# Patient Record
Sex: MALE | Race: BLACK OR AFRICAN AMERICAN | NOT HISPANIC OR LATINO | Employment: OTHER | URBAN - METROPOLITAN AREA
[De-identification: names, ages, dates, MRNs, and addresses within clinical notes are randomized per-mention and may not be internally consistent; named-entity substitution may affect disease eponyms.]

---

## 2017-01-26 ENCOUNTER — OFFICE VISIT (OUTPATIENT)
Dept: OPHTHALMOLOGY | Facility: CLINIC | Age: 72
End: 2017-01-26
Payer: MEDICARE

## 2017-01-26 DIAGNOSIS — H35.342 MACULAR HOLE, LEFT EYE: Primary | ICD-10-CM

## 2017-01-26 DIAGNOSIS — H35.033 HYPERTENSIVE RETINOPATHY OF BOTH EYES: ICD-10-CM

## 2017-01-26 PROCEDURE — 99024 POSTOP FOLLOW-UP VISIT: CPT | Mod: S$GLB,,, | Performed by: OPHTHALMOLOGY

## 2017-01-26 PROCEDURE — 99499 UNLISTED E&M SERVICE: CPT | Mod: S$GLB,,, | Performed by: OPHTHALMOLOGY

## 2017-01-26 PROCEDURE — 99999 PR PBB SHADOW E&M-EST. PATIENT-LVL III: CPT | Mod: PBBFAC,,, | Performed by: OPHTHALMOLOGY

## 2017-01-26 PROCEDURE — 92134 CPTRZ OPH DX IMG PST SGM RTA: CPT | Mod: LT,S$GLB,, | Performed by: OPHTHALMOLOGY

## 2017-01-26 NOTE — MR AVS SNAPSHOT
[place] [facility]   [facility]  [place] [address]  Phone: [phone]  Fax: [phone]                  Geovani Evans Jr.   2017 10:30 AM   Office Visit    Description:  Male : 1945   Provider:  RENE Gallagher MD   Department:  [place] [facility]           Reason for Visit     Post-op Evaluation           Diagnoses this Visit        Comments    Macular hole, left eye    -  Primary     Hypertensive retinopathy of both eyes                To Do List           Goals (5 Years of Data)     None      Follow-Up and Disposition     Return in about 3 months (around 2017).      [facility] On Call     [facility] On Call Nurse Care Line -  Assistance  Registered nurses in the [facility] On Call Center provide clinical advisement, health education, appointment booking, and other advisory services.  Call for this free service at [phone].             Medications           Message regarding Medications     Verify the changes and/or additions to your medication regime listed below are the same as discussed with your clinician today.  If any of these changes or additions are incorrect, please notify your healthcare provider.        STOP taking these medications     ondansetron (ZOFRAN) 4 MG tablet Take 1 tablet (4 mg total) by mouth every 8 (eight) hours as needed for Nausea.    oxycodone-acetaminophen (PERCOCET) 5-325 mg per tablet Take 1 tablet by mouth every 6 (six) hours as needed for Pain.           Verify that the below list of medications is an accurate representation of the medications you are currently taking.  If none reported, the list may be blank. If incorrect, please contact your healthcare provider. Carry this list with you in case of emergency.           Current Medications     dorzolamide-timolol 2-0.5% (COSOPT) 22.3-6.8 mg/mL ophthalmic solution Place 1 drop into the left eye 2 (two) times daily.    lisinopril-hydrochlorothiazide  (PRINZIDE,ZESTORETIC) 10-12.5 mg per tablet Take 1 tablet by mouth once daily.    prednisoLONE acetate (PRED FORTE) 1 % DrpS Place 1 drop into the left eye 2 (two) times daily.    homatropine (ISOPTO HOMATROPINE) 5 % ophthalmic solution Place 1 drop into the left eye 2 (two) times daily.           Clinical Reference Information           Allergies as of 1/26/2017     No Known Allergies      Immunizations Administered on Date of Encounter - 1/26/2017     None      Orders Placed During Today's Visit      Normal Orders This Visit    Posterior Segment OCT Retina-Both eyes

## 2017-01-26 NOTE — PROGRESS NOTES
OCT -   OD - No ME  OS - MH closed    A/P    1. FTMH OS  Pt unsure of duration  On 3/15, VA OS 20/40  Some small RPE atrophy suggests some chronicity    s/p 25g PPV/MP/EL/AFx/C3F8 OS for FTMH and holes OS 12/7/16    Doing well, MH closed, IOP improved    Keep PF/Cosopt Q day until bubble gone    2. NS OU    3. HTN Ret OU  BP control      3  month OCT/MRx

## 2017-04-27 ENCOUNTER — OFFICE VISIT (OUTPATIENT)
Dept: OPHTHALMOLOGY | Facility: CLINIC | Age: 72
End: 2017-04-27
Payer: MEDICARE

## 2017-04-27 DIAGNOSIS — H35.033 HYPERTENSIVE RETINOPATHY OF BOTH EYES: ICD-10-CM

## 2017-04-27 DIAGNOSIS — H35.342 MACULAR HOLE, LEFT EYE: Primary | ICD-10-CM

## 2017-04-27 PROCEDURE — 92226 PR SPECIAL EYE EXAM, SUBSEQUENT: CPT | Mod: 50,S$GLB,, | Performed by: OPHTHALMOLOGY

## 2017-04-27 PROCEDURE — 92014 COMPRE OPH EXAM EST PT 1/>: CPT | Mod: S$GLB,,, | Performed by: OPHTHALMOLOGY

## 2017-04-27 PROCEDURE — 92134 CPTRZ OPH DX IMG PST SGM RTA: CPT | Mod: S$GLB,,, | Performed by: OPHTHALMOLOGY

## 2017-04-27 PROCEDURE — 99499 UNLISTED E&M SERVICE: CPT | Mod: S$GLB,,, | Performed by: OPHTHALMOLOGY

## 2017-04-27 PROCEDURE — 99999 PR PBB SHADOW E&M-EST. PATIENT-LVL III: CPT | Mod: PBBFAC,,, | Performed by: OPHTHALMOLOGY

## 2017-04-27 NOTE — PROGRESS NOTES
OCT -   OD - No ME  OS - MH closed    A/P    1. FTMH OS  Pt unsure of duration  On 3/15, VA OS 20/40  Some small RPE atrophy suggests some chronicity    s/p 25g PPV/MP/EL/AFx/C3F8 OS for FTMH and holes OS 12/7/16    Doing well, MH closed, IOP improved    2. NS OU  Increasing OS  Eval with Dr. MIMS    3. HTN Ret OU  BP control      1 yr OCT

## 2017-04-27 NOTE — MR AVS SNAPSHOT
[place] [facility]   [facility]  [place] [address]  Phone: [phone]  Fax: [phone]                  Geovani Evans Jr.   2017 9:20 AM   Office Visit    Description:  Male : 1945   Provider:  RENE Gallagher MD   Department:  [place] [facility]           Reason for Visit     Eye Problem           Diagnoses this Visit        Comments    Macular hole, left eye    -  Primary     Hypertensive retinopathy of both eyes                To Do List           Goals (5 Years of Data)     None      Follow-Up and Disposition     Return in about 6 months (around 10/27/2017).      [facility] On Call     [facility] On Call Nurse Care Line -  Assistance  Unless otherwise directed by your provider, please contact [facility], our nurse care line that is available for  assistance.     Registered nurses in the [facility] On Call Center provide: appointment scheduling, clinical advisement, health education, and other advisory services.  Call: [phone] (toll free)               Medications           Message regarding Medications     Verify the changes and/or additions to your medication regime listed below are the same as discussed with your clinician today.  If any of these changes or additions are incorrect, please notify your healthcare provider.             Verify that the below list of medications is an accurate representation of the medications you are currently taking.  If none reported, the list may be blank. If incorrect, please contact your healthcare provider. Carry this list with you in case of emergency.           Current Medications     lisinopril-hydrochlorothiazide (PRINZIDE,ZESTORETIC) 10-12.5 mg per tablet Take 1 tablet by mouth once daily.    dorzolamide-timolol 2-0.5% (COSOPT) 22.3-6.8 mg/mL ophthalmic solution Place 1 drop into the left eye 2 (two) times daily.    homatropine (ISOPTO HOMATROPINE) 5 % ophthalmic solution Place 1 drop into the left  eye 2 (two) times daily.    prednisoLONE acetate (PRED FORTE) 1 % DrpS Place 1 drop into the left eye 2 (two) times daily.           Clinical Reference Information           Allergies as of 4/27/2017     No Known Allergies      Immunizations Administered on Date of Encounter - 4/27/2017     None      Orders Placed During Today's Visit      Normal Orders This Visit    Posterior Segment OCT Retina-Both eyes     Future Labs/Procedures Expected by Expires    Posterior Segment OCT Retina-Both eyes  As directed 4/27/2018      Language Assistance Services     ATTENTION: Language assistance services are available, free of charge. Please call [phone].      ATENCIÓN: Si shawnala eric, tiene a urena disposición servicios gratuitos de asistencia lingüística. Llame al [phone].     CHÚ Ý: N?u b?n nói Ti?ng Vi?t, có các d?ch v? h? tr? ngôn ng? mi?n phí dành cho b?n. G?i s? [phone].         [place] [facility] complies with applicable Federal civil rights laws and does not discriminate on the basis of race, color, national origin, age, disability, or sex.

## 2017-05-10 ENCOUNTER — INITIAL CONSULT (OUTPATIENT)
Dept: OPHTHALMOLOGY | Facility: CLINIC | Age: 72
End: 2017-05-10
Payer: MEDICARE

## 2017-05-10 DIAGNOSIS — H35.342 MACULAR HOLE, LEFT EYE: ICD-10-CM

## 2017-05-10 DIAGNOSIS — H26.9 CORTICAL CATARACT OF BOTH EYES: ICD-10-CM

## 2017-05-10 DIAGNOSIS — H52.7 REFRACTIVE ERROR: ICD-10-CM

## 2017-05-10 DIAGNOSIS — H25.13 NS (NUCLEAR SCLEROSIS), BILATERAL: Primary | ICD-10-CM

## 2017-05-10 PROCEDURE — 92136 OPHTHALMIC BIOMETRY: CPT | Mod: LT,S$GLB,, | Performed by: OPHTHALMOLOGY

## 2017-05-10 PROCEDURE — 92014 COMPRE OPH EXAM EST PT 1/>: CPT | Mod: S$GLB,,, | Performed by: OPHTHALMOLOGY

## 2017-05-10 PROCEDURE — 99999 PR PBB SHADOW E&M-EST. PATIENT-LVL III: CPT | Mod: PBBFAC,,, | Performed by: OPHTHALMOLOGY

## 2017-05-10 RX ORDER — OFLOXACIN 3 MG/ML
1 SOLUTION/ DROPS OPHTHALMIC 4 TIMES DAILY
Qty: 5 ML | Refills: 1 | Status: SHIPPED | OUTPATIENT
Start: 2017-06-24 | End: 2017-07-04

## 2017-05-10 RX ORDER — DIFLUPREDNATE OPHTHALMIC 0.5 MG/ML
1 EMULSION OPHTHALMIC 4 TIMES DAILY
Qty: 5 ML | Refills: 1 | Status: SHIPPED | OUTPATIENT
Start: 2017-06-27 | End: 2017-07-27

## 2017-05-10 RX ORDER — NEPAFENAC 3 MG/ML
1 SUSPENSION/ DROPS OPHTHALMIC DAILY
Qty: 3 ML | Refills: 1 | Status: SHIPPED | OUTPATIENT
Start: 2017-06-24 | End: 2017-07-24

## 2017-05-10 NOTE — PROGRESS NOTES
Subjective:       Patient ID: Geovani Evans Jr. is a 71 y.o. male.    Chief Complaint: Cataract (left eye )    HPI  Review of Systems    Objective:      Physical Exam    Assessment:       1. NS (nuclear sclerosis), bilateral    2. Cortical cataract of both eyes    3. Macular hole, left eye    4. Refractive error        Plan:       Visually significant cataract OS -Pt. Wants Sx.     Cataract OD- Not visually significant per Pt.  Macular hole OS s/p PPV/MP/EL/AFx/C#F8 OS 12/7/16-Stable per Dr Gallagher.  RE-No need for Toric IOL.          CE OS 6/27/17 SN60WF 18.5.

## 2017-05-10 NOTE — MR AVS SNAPSHOT
[place] [facility]   [facility]  [place] [address]  Phone: [phone]  Fax: [phone]                  Geovani Evans Jr.   5/10/2017 9:00 AM   Initial consult    Description:  Male : 1945   Provider:  Patricio Cruz MD   Department:  [place] [facility]           Reason for Visit     Cataract           Diagnoses this Visit        Comments    NS (nuclear sclerosis), bilateral    -  Primary     Cortical cataract of both eyes         Macular hole, left eye         Refractive error                To Do List           Future Appointments        Provider Department Dept Phone    2017 8:15 AM Patricio Cruz MD [place] [facility] [phone]    2017 9:30 AM Patricio Cruz MD [place] [facility] [phone]    2017 9:00 AM Patricio Cruz MD [place] [facility] [phone]      Goals (5 Years of Data)     None      Follow-Up and Disposition     Return in about 7 weeks (around 2017) for CE OS.       These Medications        Disp Refills Start End    ILEVRO 0.3 % DrpS 3 mL 1 2017    Place 1 drop into both eyes once daily. For 30 days - Both Eyes    Pharmacy: [facility] [address] [place] [address] Ph #: [phone]       ofloxacin (OCUFLOX) 0.3 % ophthalmic solution 5 mL 1 2017    Place 1 drop into the left eye 4 (four) times daily. - Left Eye    Pharmacy: [facility] [address] [place] [address] Ph #: [phone]       difluprednate (DUREZOL) 0.05 % Drop ophthalmic solution 5 mL 1 2017    Place 1 drop into the left eye 4 (four) times daily. For 30 days - Left Eye    Pharmacy: [facility] [address] [place] [address] [address] Ph #: [phone]         [facility]     [facility] Nurse Care Line -  Assistance  Unless otherwise directed by your provider, please contact [facility], our nurse care  line that is available for 24/7 assistance.     Registered nurses in the [facility] Center provide: appointment scheduling, clinical advisement, health education, and other advisory services.  Call: [phone] (toll free)               Medications           Message regarding Medications     Verify the changes and/or additions to your medication regime listed below are the same as discussed with your clinician today.  If any of these changes or additions are incorrect, please notify your healthcare provider.        START taking these NEW medications        Refills    ILEVRO 0.3 % DrpS 1    Starting on: 6/24/2017    Sig: Place 1 drop into both eyes once daily. For 30 days    Class: Normal    Route: Both Eyes    ofloxacin (OCUFLOX) 0.3 % ophthalmic solution 1    Starting on: 6/24/2017    Sig: Place 1 drop into the left eye 4 (four) times daily.    Class: Normal    Route: Left Eye    difluprednate (DUREZOL) 0.05 % Drop ophthalmic solution 1    Starting on: 6/27/2017    Sig: Place 1 drop into the left eye 4 (four) times daily. For 30 days    Class: Normal    Route: Left Eye      STOP taking these medications     dorzolamide-timolol 2-0.5% (COSOPT) 22.3-6.8 mg/mL ophthalmic solution Place 1 drop into the left eye 2 (two) times daily.    homatropine (ISOPTO HOMATROPINE) 5 % ophthalmic solution Place 1 drop into the left eye 2 (two) times daily.    prednisoLONE acetate (PRED FORTE) 1 % DrpS Place 1 drop into the left eye 2 (two) times daily.           Verify that the below list of medications is an accurate representation of the medications you are currently taking.  If none reported, the list may be blank. If incorrect, please contact your healthcare provider. Carry this list with you in case of emergency.           Current Medications     lisinopril-hydrochlorothiazide (PRINZIDE,ZESTORETIC) 10-12.5 mg per tablet Take 1 tablet by mouth once daily.    difluprednate (DUREZOL) 0.05 % Drop ophthalmic solution Starting  on Jun 27, 2017. Place 1 drop into the left eye 4 (four) times daily. For 30 days    ILEVRO 0.3 % DrpS Starting on Jun 24, 2017. Place 1 drop into both eyes once daily. For 30 days    ofloxacin (OCUFLOX) 0.3 % ophthalmic solution Starting on Jun 24, 2017. Place 1 drop into the left eye 4 (four) times daily.           Clinical Reference Information           Allergies as of 5/10/2017     No Known Allergies      Immunizations Administered on Date of Encounter - 5/10/2017     None      Orders Placed During Today's Visit      Normal Orders This Visit    Biometry       Language Assistance Services     ATTENTION: Language assistance services are available, free of charge. Please call [phone].      ATENCIÓN: Si shawnala eric, tiene a urena disposición servicios gratuitos de asistencia lingüística. Llame al [phone].     CHÚ Ý: N?u b?n nói Ti?ng Vi?t, có các d?ch v? h? tr? ngôn ng? mi?n phí dành cho b?n. G?i s? [phone].         [place] [facility] complies with applicable Federal civil rights laws and does not discriminate on the basis of race, color, national origin, age, disability, or sex.

## 2017-05-10 NOTE — LETTER
May 10, 2017      RENE Gallagher MD  [address]  [facility] [address]           [place] [facility]  [address] [facility]  [place] [address]  Phone: [phone]  Fax: [phone]          Patient: Geovani Evans Jr.   MR Number: [id number]   YOB: 1945   Date of Visit: 5/10/2017       Dear Dr. RENE Gallagher:    Thank you for referring Geovani Evans to me for evaluation. Attached you will find relevant portions of my assessment and plan of care.    If you have questions, please do not hesitate to call me. I look forward to following Geovani Evans along with you.    Sincerely,    Patricio Cruz MD    Enclosure  CC:  No Recipients    If you would like to receive this communication electronically, please contact [email or web address] or [phone] to request more information on [employer] Link access.    For providers and/or their staff who would like to refer a patient to [facility], please contact us through our one-stop-shop provider referral line, [facility], at [phone].    If you feel you have received this communication in error or would no longer like to receive these types of communications, please e-mail [email or web address]

## 2017-05-16 ENCOUNTER — TELEPHONE (OUTPATIENT)
Dept: OPHTHALMOLOGY | Facility: CLINIC | Age: 72
End: 2017-05-16

## 2017-05-16 DIAGNOSIS — H25.12 NUCLEAR SCLEROSIS, LEFT: Primary | ICD-10-CM

## 2017-06-20 ENCOUNTER — TELEPHONE (OUTPATIENT)
Dept: OPTOMETRY | Facility: CLINIC | Age: 72
End: 2017-06-20

## 2017-06-24 NOTE — H&P
[facility]  History & Physical    Subjective:      Chief Complaint/Reason for Admission:     Geovani Evans Jr. is a 71 y.o. male.    Past Medical History:   Diagnosis Date    Anemia     Hypertension     Hypogonadism male     Obesity     Thyroid nodule     neg bx 2/2011     Past Surgical History:   Procedure Laterality Date    FRACTURE SURGERY      left great toe. bunion repair/shaved bone     Family History   Problem Relation Age of Onset    Stroke Father     Heart disease Father     No Known Problems Mother     No Known Problems Brother     No Known Problems Daughter     No Known Problems Daughter     No Known Problems Daughter     Blindness Neg Hx     Cataracts Neg Hx     Diabetes Neg Hx     Glaucoma Neg Hx     Hypertension Neg Hx     Macular degeneration Neg Hx     Retinal detachment Neg Hx     Strabismus Neg Hx     Thyroid disease Neg Hx     Thyroid cancer Neg Hx      Social History   Substance Use Topics    Smoking status: Never Smoker    Smokeless tobacco: Never Used    Alcohol use No       No prescriptions prior to admission.     Review of patient's allergies indicates:  No Known Allergies     Review of Systems   Eyes: Positive for blurred vision.   All other systems reviewed and are negative.      Objective:      Vital Signs (Most Recent)       Vital Signs Range (Last 24H):       Physical Exam   Constitutional: He is oriented to person, place, and time. He appears well-developed and well-nourished.   HENT:   Head: Normocephalic.   Eyes: Conjunctivae and EOM are normal. Pupils are equal, round, and reactive to light.   Neck: Normal range of motion. Neck supple.   Cardiovascular: Normal rate.    Pulmonary/Chest: Effort normal and breath sounds normal.   Abdominal: Soft. Bowel sounds are normal.   Musculoskeletal: Normal range of motion.   Neurological: He is alert and oriented to person, place, and time.   Skin: Skin is warm.   Psychiatric: He has a normal mood  and affect.       Data Review:    ECG:     Assessment:      Cataract OS.    Plan:    CE OS.

## 2017-06-27 ENCOUNTER — ANESTHESIA EVENT (OUTPATIENT)
Dept: SURGERY | Facility: HOSPITAL | Age: 72
End: 2017-06-27
Payer: MEDICARE

## 2017-06-27 ENCOUNTER — ANESTHESIA (OUTPATIENT)
Dept: SURGERY | Facility: HOSPITAL | Age: 72
End: 2017-06-27
Payer: MEDICARE

## 2017-06-27 ENCOUNTER — HOSPITAL ENCOUNTER (OUTPATIENT)
Facility: HOSPITAL | Age: 72
Discharge: HOME | End: 2017-06-27
Attending: OPHTHALMOLOGY | Admitting: OPHTHALMOLOGY
Payer: MEDICARE

## 2017-06-27 VITALS
HEIGHT: 69 IN | RESPIRATION RATE: 16 BRPM | DIASTOLIC BLOOD PRESSURE: 78 MMHG | HEART RATE: 69 BPM | WEIGHT: 225 LBS | SYSTOLIC BLOOD PRESSURE: 120 MMHG | OXYGEN SATURATION: 100 % | BODY MASS INDEX: 33.33 KG/M2 | TEMPERATURE: 98 F

## 2017-06-27 DIAGNOSIS — H25.10 SENILE NUCLEAR SCLEROSIS: ICD-10-CM

## 2017-06-27 PROCEDURE — 27201423 OPTIME MED/SURG SUP & DEVICES STERILE SUPPLY: Performed by: OPHTHALMOLOGY

## 2017-06-27 PROCEDURE — 36000706: Performed by: OPHTHALMOLOGY

## 2017-06-27 PROCEDURE — D9220A PRA ANESTHESIA: Mod: ANES,,, | Performed by: ANESTHESIOLOGY

## 2017-06-27 PROCEDURE — C9447 INJ, PHENYLEPHRINE KETOROLAC: HCPCS | Performed by: OPHTHALMOLOGY

## 2017-06-27 PROCEDURE — V2632 POST CHMBR INTRAOCULAR LENS: HCPCS | Performed by: OPHTHALMOLOGY

## 2017-06-27 PROCEDURE — 63600175 PHARM REV CODE 636 W HCPCS: Performed by: OPHTHALMOLOGY

## 2017-06-27 PROCEDURE — 63600175 PHARM REV CODE 636 W HCPCS: Performed by: NURSE ANESTHETIST, CERTIFIED REGISTERED

## 2017-06-27 PROCEDURE — 37000008 HC ANESTHESIA 1ST 15 MINUTES: Performed by: OPHTHALMOLOGY

## 2017-06-27 PROCEDURE — 37000009 HC ANESTHESIA EA ADD 15 MINS: Performed by: OPHTHALMOLOGY

## 2017-06-27 PROCEDURE — 25000003 PHARM REV CODE 250: Performed by: ANESTHESIOLOGY

## 2017-06-27 PROCEDURE — 25000003 PHARM REV CODE 250: Performed by: OPHTHALMOLOGY

## 2017-06-27 PROCEDURE — 36000707: Performed by: OPHTHALMOLOGY

## 2017-06-27 PROCEDURE — 71000033 HC RECOVERY, INTIAL HOUR: Performed by: OPHTHALMOLOGY

## 2017-06-27 PROCEDURE — 66984 XCAPSL CTRC RMVL W/O ECP: CPT | Mod: LT,,, | Performed by: OPHTHALMOLOGY

## 2017-06-27 PROCEDURE — D9220A PRA ANESTHESIA: Mod: CRNA,,, | Performed by: NURSE ANESTHETIST, CERTIFIED REGISTERED

## 2017-06-27 PROCEDURE — 71000015 HC POSTOP RECOV 1ST HR: Performed by: OPHTHALMOLOGY

## 2017-06-27 DEVICE — LENS 18.5: Type: IMPLANTABLE DEVICE | Site: EYE | Status: FUNCTIONAL

## 2017-06-27 RX ORDER — SODIUM CHLORIDE 9 MG/ML
INJECTION, SOLUTION INTRAVENOUS CONTINUOUS
Status: DISCONTINUED | OUTPATIENT
Start: 2017-06-27 | End: 2017-06-27 | Stop reason: HOSPADM

## 2017-06-27 RX ORDER — CYCLOPENTOLATE HYDROCHLORIDE 10 MG/ML
1 SOLUTION/ DROPS OPHTHALMIC
Status: DISCONTINUED | OUTPATIENT
Start: 2017-06-27 | End: 2017-06-27 | Stop reason: HOSPADM

## 2017-06-27 RX ORDER — PROPARACAINE HYDROCHLORIDE 5 MG/ML
1 SOLUTION/ DROPS OPHTHALMIC
Status: DISCONTINUED | OUTPATIENT
Start: 2017-06-27 | End: 2017-06-27 | Stop reason: HOSPADM

## 2017-06-27 RX ORDER — OFLOXACIN 3 MG/ML
SOLUTION/ DROPS OPHTHALMIC
Status: DISCONTINUED | OUTPATIENT
Start: 2017-06-27 | End: 2017-06-27 | Stop reason: HOSPADM

## 2017-06-27 RX ORDER — ACETAMINOPHEN 325 MG/1
650 TABLET ORAL EVERY 4 HOURS PRN
Status: DISCONTINUED | OUTPATIENT
Start: 2017-06-27 | End: 2017-06-27 | Stop reason: HOSPADM

## 2017-06-27 RX ORDER — TROPICAMIDE 10 MG/ML
1 SOLUTION/ DROPS OPHTHALMIC
Status: DISCONTINUED | OUTPATIENT
Start: 2017-06-27 | End: 2017-06-27 | Stop reason: HOSPADM

## 2017-06-27 RX ORDER — OFLOXACIN 3 MG/ML
1 SOLUTION/ DROPS OPHTHALMIC
Status: DISPENSED | OUTPATIENT
Start: 2017-06-27 | End: 2017-06-27

## 2017-06-27 RX ORDER — PHENYLEPHRINE HYDROCHLORIDE 25 MG/ML
1 SOLUTION/ DROPS OPHTHALMIC
Status: DISCONTINUED | OUTPATIENT
Start: 2017-06-27 | End: 2017-06-27 | Stop reason: HOSPADM

## 2017-06-27 RX ORDER — LIDOCAINE HYDROCHLORIDE 10 MG/ML
1 INJECTION, SOLUTION EPIDURAL; INFILTRATION; INTRACAUDAL; PERINEURAL ONCE
Status: COMPLETED | OUTPATIENT
Start: 2017-06-27 | End: 2017-06-27

## 2017-06-27 RX ORDER — LIDOCAINE HYDROCHLORIDE 40 MG/ML
INJECTION, SOLUTION RETROBULBAR
Status: DISCONTINUED | OUTPATIENT
Start: 2017-06-27 | End: 2017-06-27 | Stop reason: HOSPADM

## 2017-06-27 RX ORDER — HYDROCODONE BITARTRATE AND ACETAMINOPHEN 5; 325 MG/1; MG/1
TABLET ORAL
Status: DISCONTINUED
Start: 2017-06-27 | End: 2017-06-27 | Stop reason: HOSPADM

## 2017-06-27 RX ORDER — FENTANYL CITRATE 50 UG/ML
INJECTION, SOLUTION INTRAMUSCULAR; INTRAVENOUS
Status: DISCONTINUED | OUTPATIENT
Start: 2017-06-27 | End: 2017-06-27

## 2017-06-27 RX ORDER — PREDNISOLONE ACETATE 10 MG/ML
SUSPENSION/ DROPS OPHTHALMIC
Status: DISCONTINUED | OUTPATIENT
Start: 2017-06-27 | End: 2017-06-27 | Stop reason: HOSPADM

## 2017-06-27 RX ORDER — HYDROCODONE BITARTRATE AND ACETAMINOPHEN 5; 325 MG/1; MG/1
1 TABLET ORAL EVERY 4 HOURS PRN
Status: DISCONTINUED | OUTPATIENT
Start: 2017-06-27 | End: 2017-06-27 | Stop reason: HOSPADM

## 2017-06-27 RX ORDER — MIDAZOLAM HYDROCHLORIDE 1 MG/ML
INJECTION, SOLUTION INTRAMUSCULAR; INTRAVENOUS
Status: DISCONTINUED | OUTPATIENT
Start: 2017-06-27 | End: 2017-06-27

## 2017-06-27 RX ADMIN — PHENYLEPHRINE HYDROCHLORIDE 1 DROP: 25 SOLUTION/ DROPS OPHTHALMIC at 12:06

## 2017-06-27 RX ADMIN — OFLOXACIN 1 DROP: 3 SOLUTION OPHTHALMIC at 12:06

## 2017-06-27 RX ADMIN — CYCLOPENTOLATE HYDROCHLORIDE 1 DROP: 10 SOLUTION/ DROPS OPHTHALMIC at 12:06

## 2017-06-27 RX ADMIN — TROPICAMIDE 1 DROP: 10 SOLUTION/ DROPS OPHTHALMIC at 12:06

## 2017-06-27 RX ADMIN — MIDAZOLAM HYDROCHLORIDE 2 MG: 1 INJECTION, SOLUTION INTRAMUSCULAR; INTRAVENOUS at 01:06

## 2017-06-27 RX ADMIN — FENTANYL CITRATE 50 MCG: 50 INJECTION, SOLUTION INTRAMUSCULAR; INTRAVENOUS at 02:06

## 2017-06-27 RX ADMIN — LIDOCAINE HYDROCHLORIDE 0.1 MG: 10 INJECTION, SOLUTION EPIDURAL; INFILTRATION; INTRACAUDAL; PERINEURAL at 12:06

## 2017-06-27 RX ADMIN — PROPARACAINE HYDROCHLORIDE 1 DROP: 5 SOLUTION/ DROPS OPHTHALMIC at 12:06

## 2017-06-27 RX ADMIN — SODIUM CHLORIDE: 0.9 INJECTION, SOLUTION INTRAVENOUS at 12:06

## 2017-06-27 RX ADMIN — HYDROCODONE BITARTRATE AND ACETAMINOPHEN 1 TABLET: 5; 325 TABLET ORAL at 02:06

## 2017-06-27 NOTE — ANESTHESIA POSTPROCEDURE EVALUATION
"Anesthesia Post Evaluation    Patient: Geovani Evans Jr.    Procedure(s) Performed: Procedure(s) (LRB):  PHACOEMULSIFICATION-ASPIRATION-CATARACT (Left)  INSERTION-INTRAOCULAR LENS (IOL) (Left)    Final Anesthesia Type: general  Patient location during evaluation: PACU  Patient participation: Yes- Able to Participate  Level of consciousness: awake and alert  Post-procedure vital signs: reviewed and stable  Pain management: adequate  Airway patency: patent  PONV status at discharge: No PONV  Anesthetic complications: no      Cardiovascular status: blood pressure returned to baseline  Respiratory status: unassisted  Hydration status: euvolemic  Follow-up not needed.        Visit Vitals  /80   Pulse 63   Temp 36.5 °C (97.7 °F) (Skin)   Resp 20   Ht 5' 9" (1.753 m)   Wt 102.1 kg (225 lb)   SpO2 100%   BMI 33.23 kg/m²       Pain/Celine Score: Pain Assessment Performed: Yes (6/27/2017 12:49 PM)  Presence of Pain: denies (6/27/2017 12:49 PM)  Pain Rating Prior to Med Admin: 6 (6/27/2017  2:53 PM)  Celine Score: 10 (6/27/2017  2:53 PM)  Modified Celine Score: 20 (6/27/2017 12:49 PM)      "

## 2017-06-27 NOTE — BRIEF OP NOTE
Operative Note     SUMMARY     Surgery Date: 6/27/2017    Surgeon(s) and Role:      Patricio Cruz MD - Primary    Pre-op Diagnosis:  Nuclear sclerosis     Post-op/ Diagnosis:  Same    Final Diagnosis: Cataract    Procedure(s) (LRB):  PHACOEMULSIFICATION-ASPIRATION-CATARACT   INSERTION-INTRAOCULAR LENS (IOL)     Anesthesia: Monitored Anesthesia Care    Findings/Key Components:  Cataract    Outcome: Tolerated procedure well    Estimated Blood Loss: None         Specimens     None          Follow-up:  Tomorrow in clinic      Discharge Note      SUMMARY     Admit Date: 6/27/2017    Attending Physician: Patricio Cruz MD    Discharge Physician: Patricio Cruz MD    Discharge Date: 6/27/2017    Final Diagnosis: Cataract    Outcome: Tolerated procedure well    Disposition: Discharge to Home.    Medications:       Geovani Evans Jr.   Home Medication Instructions OTIS:[id number]    Printed on:06/27/17 4490   Medication Information                      difluprednate (DUREZOL) 0.05 % Drop ophthalmic solution  Place 1 drop into the left eye 4 (four) times daily. For 30 days             ILEVRO 0.3 % DrpS  Place 1 drop into both eyes once daily. For 30 days             lisinopril-hydrochlorothiazide (PRINZIDE,ZESTORETIC) 10-12.5 mg per tablet  Take 1 tablet by mouth once daily.             ofloxacin (OCUFLOX) 0.3 % ophthalmic solution  Place 1 drop into the left eye 4 (four) times daily.                   Patient Discharge Instructions:     Keep Evans Shield over operated eye when not using drops.     DIET:  Regular    Activity: No heavy lifting or bending X 1 week.    Follow-up:  Tomorrow in clinic

## 2017-06-27 NOTE — DISCHARGE INSTRUCTIONS
Discharge Instructions for Cataract Surgery  A surgeon removed the cloudy lens in your eye and replaced it with a clear man-made lens. Be sure to have an adult family member or friend drive you home after surgery. Heres what you can expect following surgery and tips for a healthy recovery.  It is normal to have the following:  · Bruised or bloodshot eye for 7 days  · Itching and mild discomfort for several days  · Some fluid discharge  · Sensitivity to light  · Scratchy, sandlike feeling in the eye for 2 weeks  · Feeling tired, especially during the first 24 hours  Activity level  · Do not drive for 2 days or as instructed by your doctor.  · Do not drink alcohol for at least 24 hours.  · Avoid bending at the waist to  objects or lifting anything heavy for 2 days.  · Relax for the first 24 hours after surgery. Watching TV and reading are OK and wont harm your eye.  Eye protection  · Do not rub or press on your eye.  · Sleep on your back or on your unoperated side for 2 nights.  · If instructed, wear a bandage over your eye for 2 days and 2 nights.  · If instructed, wear a shield to protect your eye for 2 days and 2 nights.  Using eyedrops  You may be given special eyedrops or ointment. Here is one way to use eyedrops:  · Tilt your head back.  · Pull your bottom eyelid down.  · Squeeze one drop into your eye. Do not touch your eye with the bottle tip.  · Close your eyes for a few seconds.  · If you need more than one drop, wait a few minutes before adding the next one.   Call your doctor right away if you have any of the following:  · Bleeding or discharge from the eye  · Your vision suddenly becomes worse  · Pain medicine you are told to take does not ease your pain  · Nausea or vomiting  · Chills or fever over 100.4°F (39.1°C)   Date Last Reviewed: 5/30/2015  © 6031-2146 [employer]. [address] [place] [address], [place] [address]. All rights reserved. This information is not intended as a  substitute for professional medical care. Always follow your healthcare professional's instructions.

## 2017-06-27 NOTE — ANESTHESIA RELEASE NOTE
"Anesthesia Release from PACU Note    Patient: Geovani Evans Jr.    Procedure(s) Performed: Procedure(s) (LRB):  PHACOEMULSIFICATION-ASPIRATION-CATARACT (Left)  INSERTION-INTRAOCULAR LENS (IOL) (Left)    Anesthesia type: general    Post pain: Adequate analgesia    Post assessment: no apparent anesthetic complications    Last Vitals:   Visit Vitals  /80   Pulse 63   Temp 36.5 °C (97.7 °F) (Skin)   Resp 20   Ht 5' 9" (1.753 m)   Wt 102.1 kg (225 lb)   SpO2 100%   BMI 33.23 kg/m²       Post vital signs: stable    Level of consciousness: awake    Nausea/Vomiting: no nausea/no vomiting    Complications: none    Airway Patency: patent    Respiratory: unassisted    Cardiovascular: stable and blood pressure at baseline    Hydration: euvolemic  "

## 2017-06-27 NOTE — PLAN OF CARE
Pt to pod 15 AAOx4 oriented to immediate surroundings and situation and verbalized understanding, acceptance and satisfaction with clinical encounter.Patient arrived to unit via stretcher from____or___. Denies pain. Denies shortness of breath. Monitoring equipment placed on pt with noted VSS HRR .IVF patent per gravity and without any s/s of infiltration noted and saline locked upon arrival.. Bed in lowest position. Brakes locked Call light within reach. Side rails up x2.

## 2017-06-27 NOTE — ANESTHESIA PREPROCEDURE EVALUATION
06/27/2017  Geovani Evans Jr. is a 71 y.o., male.  72 yo male with pmh of htn presents for phacoemulsification of left eye.  Anesthesia Evaluation    I have reviewed the Patient Summary Reports.        Review of Systems  Anesthesia Hx:   Denies Personal Hx of Anesthesia complications.   Cardiovascular:   Hypertension        Physical Exam  General:  Well nourished    Airway/Jaw/Neck:  Airway Findings: Mouth Opening: Normal Tongue: Normal  General Airway Assessment: Adult  Mallampati: III  Improves to III with phonation.  TM Distance: Normal, at least 6 cm      Dental:  Dental Findings:   Chest/Lungs:  Chest/Lungs Clear    Heart/Vascular:  Heart Findings: Normal            Anesthesia Plan  Type of Anesthesia, risks & benefits discussed:  Anesthesia Type:  general  Patient's Preference:   Intra-op Monitoring Plan:   Intra-op Monitoring Plan Comments:   Post Op Pain Control Plan:   Post Op Pain Control Plan Comments:   Induction:   IV  Beta Blocker:  Patient is not currently on a Beta-Blocker (No further documentation required).       Informed Consent: Patient understands risks and agrees with Anesthesia plan.  Questions answered. Anesthesia consent signed with patient.  ASA Score: 2     Day of Surgery Review of History & Physical:  There are no significant changes.          Ready For Surgery From Anesthesia Perspective.

## 2017-06-27 NOTE — TRANSFER OF CARE
"Anesthesia Transfer of Care Note    Patient: Geovani Evans Jr.    Procedure(s) Performed: Procedure(s) (LRB):  PHACOEMULSIFICATION-ASPIRATION-CATARACT (Left)  INSERTION-INTRAOCULAR LENS (IOL) (Left)    Patient location: PACU    Anesthesia Type: MAC    Transport from OR: Transported from OR on room air with adequate spontaneous ventilation    Post pain: adequate analgesia    Post assessment: no apparent anesthetic complications    Post vital signs: stable    Level of consciousness: awake and alert    Nausea/Vomiting: no nausea/vomiting    Complications: none    Transfer of care protocol was followed      Last vitals:   Visit Vitals  /83 (BP Location: Right arm, Patient Position: Lying, BP Method: Automatic)   Pulse 63   Temp 36.5 °C (97.7 °F) (Skin)   Resp 20   Ht 5' 9" (1.753 m)   Wt 102.1 kg (225 lb)   SpO2 100%   BMI 33.23 kg/m²     "

## 2017-06-28 ENCOUNTER — OFFICE VISIT (OUTPATIENT)
Dept: OPHTHALMOLOGY | Facility: CLINIC | Age: 72
End: 2017-06-28
Payer: MEDICARE

## 2017-06-28 VITALS — SYSTOLIC BLOOD PRESSURE: 116 MMHG | DIASTOLIC BLOOD PRESSURE: 81 MMHG | HEART RATE: 70 BPM

## 2017-06-28 DIAGNOSIS — Z98.890 POST-OPERATIVE STATE: Primary | ICD-10-CM

## 2017-06-28 PROCEDURE — 99024 POSTOP FOLLOW-UP VISIT: CPT | Mod: S$GLB,,, | Performed by: OPHTHALMOLOGY

## 2017-06-28 PROCEDURE — 99999 PR PBB SHADOW E&M-EST. PATIENT-LVL III: CPT | Mod: PBBFAC,,, | Performed by: OPHTHALMOLOGY

## 2017-06-28 NOTE — PROGRESS NOTES
Subjective:       Patient ID: Geovani Evans Jr. is a 71 y.o. male.    Chief Complaint: Post-op Evaluation    HPI  Review of Systems    Objective:      Physical Exam    Assessment:       1. Post-operative state        Plan:       S/p CE OS- Doing well.           CPM OS.  RTC 1 wk.

## 2017-07-05 ENCOUNTER — OFFICE VISIT (OUTPATIENT)
Dept: OPHTHALMOLOGY | Facility: CLINIC | Age: 72
End: 2017-07-05
Payer: MEDICARE

## 2017-07-05 DIAGNOSIS — Z98.890 POST-OPERATIVE STATE: Primary | ICD-10-CM

## 2017-07-05 PROCEDURE — 99024 POSTOP FOLLOW-UP VISIT: CPT | Mod: S$GLB,,, | Performed by: OPHTHALMOLOGY

## 2017-07-05 PROCEDURE — 99999 PR PBB SHADOW E&M-EST. PATIENT-LVL II: CPT | Mod: PBBFAC,,, | Performed by: OPHTHALMOLOGY

## 2017-07-05 NOTE — PROGRESS NOTES
Subjective:       Patient ID: Geovani Evans Jr. is a 71 y.o. male.    Chief Complaint: Post-op Evaluation (1 week po os)    HPI  Review of Systems    Objective:      Physical Exam    Assessment:       1. Post-operative state        Plan:       S/p CE OS- Doing well.           Increase Durezol to qid OS & taper off over 4 wks.  RTC 3 wks.

## 2017-07-11 ENCOUNTER — OFFICE VISIT (OUTPATIENT)
Dept: INTERNAL MEDICINE | Facility: CLINIC | Age: 72
End: 2017-07-11
Payer: MEDICARE

## 2017-07-11 ENCOUNTER — HOSPITAL ENCOUNTER (OUTPATIENT)
Dept: RADIOLOGY | Facility: HOSPITAL | Age: 72
Discharge: HOME | End: 2017-07-11
Attending: INTERNAL MEDICINE
Payer: MEDICARE

## 2017-07-11 VITALS
BODY MASS INDEX: 34.38 KG/M2 | TEMPERATURE: 99 F | WEIGHT: 232.13 LBS | HEIGHT: 69 IN | SYSTOLIC BLOOD PRESSURE: 110 MMHG | DIASTOLIC BLOOD PRESSURE: 76 MMHG | OXYGEN SATURATION: 98 % | HEART RATE: 75 BPM

## 2017-07-11 DIAGNOSIS — M79.676 GREAT TOE PAIN, UNSPECIFIED LATERALITY: ICD-10-CM

## 2017-07-11 DIAGNOSIS — M79.676 GREAT TOE PAIN, UNSPECIFIED LATERALITY: Primary | ICD-10-CM

## 2017-07-11 PROCEDURE — 73630 X-RAY EXAM OF FOOT: CPT | Mod: 50,TC

## 2017-07-11 PROCEDURE — 99999 PR PBB SHADOW E&M-EST. PATIENT-LVL III: CPT | Mod: PBBFAC,,, | Performed by: INTERNAL MEDICINE

## 2017-07-11 PROCEDURE — 1159F MED LIST DOCD IN RCRD: CPT | Mod: S$GLB,,, | Performed by: INTERNAL MEDICINE

## 2017-07-11 PROCEDURE — 99213 OFFICE O/P EST LOW 20 MIN: CPT | Mod: S$GLB,,, | Performed by: INTERNAL MEDICINE

## 2017-07-11 PROCEDURE — 73630 X-RAY EXAM OF FOOT: CPT | Mod: 26,50,, | Performed by: RADIOLOGY

## 2017-07-11 PROCEDURE — 1125F AMNT PAIN NOTED PAIN PRSNT: CPT | Mod: S$GLB,,, | Performed by: INTERNAL MEDICINE

## 2017-07-11 NOTE — PROGRESS NOTES
Subjective:       Patient ID: Geovani Evans Jr. is a 71 y.o. male.    Chief Complaint: Foot Swelling (Both Lt & Rt , 1+week)    HPI       Patient is a 71 year old male here today for foot swelling.  Sx began one week ago. Reports toe swelling of both sides, L>R and painful to ambulate with. No trauma. No falls prior to onset of pain. Has not had gout episode before. No fever/chills. No new medications. Has not changed his shoes.      Review of Systems   Constitutional: Negative for chills, fatigue and fever.   Respiratory: Negative for shortness of breath.    Cardiovascular: Negative for chest pain and leg swelling.   Musculoskeletal:        Toe pain         Objective:      Physical Exam   Constitutional: He is oriented to person, place, and time. He appears well-developed and well-nourished. No distress.   HENT:   Head: Normocephalic and atraumatic.   Neurological: He is alert and oriented to person, place, and time.   Skin: Skin is warm and dry. He is not diaphoretic.   Nursing note and vitals reviewed.          Foot exam:  Left great toe: hypertrophy of MTP  Normal passive ROM  Pain with active ROM  No TTP to the MTP  No erythema,warmth,swelling noted  Slight peeling of skin of the hypertrophied bone    Right Great toe: slight hypertrophy noted  Normal passive/active ROM  No TTP to MTP  No erythema, warmth or swelling noted  Assessment:       1. Great toe pain, unspecified laterality        Plan:         Patient has hypertrophy of the MTP of the great toe bilaterally, worse on the Left.  No pain with direct palpation of the joint, some pain with active but not passive flexion/extension of the joint  No warmth, tenderness, erythema to suggest acute infection or gout  Recommend Xray both toes today  Referral to podiatry, is this a bunion that is rubbing on his shoe?  Change shoes that are open so that the toes are not restricted

## 2017-07-12 ENCOUNTER — OFFICE VISIT (OUTPATIENT)
Dept: PODIATRY | Facility: CLINIC | Age: 72
End: 2017-07-12
Payer: MEDICARE

## 2017-07-12 VITALS — WEIGHT: 232 LBS | RESPIRATION RATE: 18 BRPM | HEIGHT: 69 IN | BODY MASS INDEX: 34.36 KG/M2

## 2017-07-12 DIAGNOSIS — M10.9 ACUTE GOUT OF LEFT FOOT, UNSPECIFIED CAUSE: Primary | ICD-10-CM

## 2017-07-12 PROCEDURE — 99999 PR PBB SHADOW E&M-EST. PATIENT-LVL III: CPT | Mod: PBBFAC,,, | Performed by: PODIATRIST

## 2017-07-12 PROCEDURE — 1159F MED LIST DOCD IN RCRD: CPT | Mod: S$GLB,,, | Performed by: PODIATRIST

## 2017-07-12 PROCEDURE — 1125F AMNT PAIN NOTED PAIN PRSNT: CPT | Mod: S$GLB,,, | Performed by: PODIATRIST

## 2017-07-12 PROCEDURE — 99213 OFFICE O/P EST LOW 20 MIN: CPT | Mod: S$GLB,,, | Performed by: PODIATRIST

## 2017-07-12 RX ORDER — METHYLPREDNISOLONE 4 MG/1
4 TABLET ORAL DAILY
Qty: 1 TABLET | Refills: 0 | Status: SHIPPED | OUTPATIENT
Start: 2017-07-12 | End: 2017-09-06

## 2017-07-12 NOTE — PROGRESS NOTES
"Subjective:      Patient ID: Geovani Evans Jr. is a 71 y.o. male.    Chief Complaint: PCP (Isabella Urbina MD 7/11/17); Foot Problem (hot to touch and pain bunion area ); and Foot Pain (both feet )    Geovani is a 71 y.o. male who presents to the podiatry clinic  with complaint of  bilateral foot pain, great toe L>>R. Onset of the symptoms was a week ago. Precipitating event: none known. Current symptoms include: ability to bear weight, but with some pain, redness, stiffness and swelling. Aggravating factors: walking. Symptoms have gradually worsened.  Seen by me 11/2015 w/ same problem treated sucessfully w/ medrol dose ramses     Review of Systems   Constitution: Negative for chills, decreased appetite and fever.   Cardiovascular: Negative for leg swelling.   Musculoskeletal: Negative for arthritis, joint pain, joint swelling and myalgias.   Gastrointestinal: Negative for nausea and vomiting.   Neurological: Negative for loss of balance, numbness and paresthesias.           Objective:       Vitals:    07/12/17 1304   Resp: 18   Weight: 105.2 kg (232 lb)   Height: 5' 9" (1.753 m)   PainSc:   9   PainLoc: Foot        Physical Exam   Constitutional: He is oriented to person, place, and time. He appears well-developed and well-nourished.   Cardiovascular: Intact distal pulses.    dorsalis pedis and posterior tibial pulses are palpable bilaterally. Capillary refill time is within normal limits. + pedal hair growth          Musculoskeletal: Normal range of motion. He exhibits no edema or tenderness.   Adequate joint range of motion without pain, limitation, nor crepitation Bilateral feet and ankle joints. Muscle strength is 5/5 in all groups bilaterally.      TTP 1st MTPJ b/l L>R w/ associated redness , warmth and swelling    Neurological: He is alert and oriented to person, place, and time. He has normal strength. No sensory deficit.   [place]-Shawn 5.07 monofilament is intact bilateral feet.      Skin: Skin is warm, " dry and intact. No lesion and no rash noted. No erythema.   Psychiatric: He has a normal mood and affect. His behavior is normal.   Vitals reviewed.            Assessment:       Encounter Diagnosis   Name Primary?    Acute gout of left foot, unspecified cause Yes         Plan:       Geovani was seen today for pcp, foot problem and foot pain.    Diagnoses and all orders for this visit:    Acute gout of left foot, unspecified cause  -     methylPREDNISolone (MEDROL DOSEPACK) 4 mg tablet; Take 1 tablet (4 mg total) by mouth once daily. Use as instructed on dose pack  -     Uric acid; Future      I counseled the patient on his conditions, their implications and medical management.    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

## 2017-07-12 NOTE — PATIENT INSTRUCTIONS
Eating to Prevent Gout  Gout is a painful form of arthritis caused by an excess of uric acid. This is a waste product made by the body. It builds up in the body and forms crystals that collect in the joints, bringing on a gout attack. Alcohol and certain foods can trigger a gout attack. Below are some guidelines for changing your diet to help you manage gout. Your healthcare provider can work with you to determine the best eating plan for you. Know that diet is only one part of managing gout. Take your medicines as prescribed and follow the other guidelines your healthcare provider has given you.  Foods to limit  Eating too many foods containing purines may increase the levels of uric acid in your body and increase your risk for a gout attack. It may be best to limit these high-purine foods:  · Alcohol (beer, red wine). You may be told to avoid alcohol completely.  · Certain fish (anchovies, sardines, fish roes, herring, tuna, mussels, codfish, scallops, trout, and luma)  · Certain meats (red meat, processed meat, trevino, turkey, wild game, and goose)  · Sauces and gravies made with meat  · Organ meats (such as liver, kidneys, sweetbreads, and tripe)  · Legumes (such as dried beans, peas)  · Mushrooms, spinach, asparagus, and cauliflower  · Yeast and yeast extract supplements  Foods to try  Some foods may be helpful for people with gout. You may want to try adding some of the following foods to your diet:  · Dark berries: These include blueberries, blackberries, and cherries. These berries contain chemicals that may lower uric acid.  · Tofu: Tofu, which is made from soy, is a good source of protein. Studies have shown that it may be a better choice than meat for people with gout.  · Omega fatty acids: These acids are found in fatty fish (such as salmon), certain oils (such as flax, olive, or nut oils), or nuts. They may help prevent inflammation due to gout.  The following guidelines are recommended by the  American Medical Association for people with gout. Your diet should be:  · High in fiber, whole grains, fruits, and vegetables.  · Low in protein (15% of calories should come from protein. Choose lean sources such as soy, lean meats, and poultry).  · Low in fat (no more than 30% of calories should come from fat, with only 10% coming from animal fat).   Date Last Reviewed: 6/17/2015 © 2000-2016 [employer]. [address] [place] [address]. All rights reserved. This information is not intended as a substitute for professional medical care. Always follow your healthcare professional's instructions.        Gout    Gout is an inflammation of a joint due to a build-up of gout crystals in the joint fluid. This occurs when there is an excess of uric acid (a normal waste product) in the body. Uric acid builds up in the body when the kidneys are unable to filter enough of it from the blood. This may occur with age. It is also associated with kidney disease. Gout occurs more often in persons with obesity, diabetes, hypertension, or high levels of fats in the blood. It may be run in families. Gout tends to come and go. A flare up of gout is called an attack. Drinking alcohol or eating certain foods (such as shellfish or foods with additives such as high-fructose corn syrup) may increase uric acid levels in the blood and cause a gout attack.  During a gout attack, the affected joint may become a hot, red, swollen and painful. If you have had one attack of gout, you are likely to have another. An attack of gout can be treated with medicine. If these attacks become frequent, a daily medicine may be prescribed to help the kidneys remove uric acid from the body.  Home care  During a gout attack:  · Rest painful joints. If gout affects the joints of your foot or leg, you may want to use crutches for the first few days to keep from bearing weight on the affected joint.  · When sitting or lying down, raise the  painful joint to a level higher than your heart.  · Apply an ice pack (ice cubes in a plastic bag wrapped in a thin towel) over the injured area for 20 minutes every 1-2 hours the first day for pain relief. Continue this 3-4 times a day for swelling and pain.  · Avoid alcohol and foods listed below (see Preventing attacks) during a gout attack. Drink extra fluid to help flush the uric acid through your kidneys.  · If you were prescribed a medication to treat gout, take it as your healthcare provider has instructed. Don't skip doses.  · Take anti-inflammatory medicine as directed.   · If pain medicines have been prescribed, take them exactly as directed.    Preventing attacks  · Minimize or avoid alcohol use. Excess alcohol intake can cause a gout attack.  · Limit these foods and beverages:  ¨ Organ meats, such as kidneys and liver  ¨ Certain seafoods (anchovies, sardines, shrimp, scallops, herring, mackerel)  ¨ Wild game, meat extracts and meat gravies  ¨ Foods and beverages sweetened with high-fructose corn syrup, such as sodas  · Eat a healthy diet including low-fat and nonfat dairy, whole grains, and vegetables.  · If you are overweight, talk to your healthcare provider about a weight reduction plan. Avoid fasting or extreme low calorie diets (less than 900 calories per day). This will increase uric acid levels in the body.  · If you have diabetes or high blood pressure, work with your doctor to manage these conditions.  · Protect the joint from injury. Trauma can trigger a gout attack.  Follow-up care  Follow up with your healthcare provider or as advised.   When to seek medical advice  Call your healthcare provider if you have any of the following:  · Fever over 100.4°F (38.ºC) with worsening joint pain  · Increasing redness around the joint  · Pain developing in another joint  · Repeated vomiting, abdominal pain, or blood in the vomit or stool (black or red color)  Date Last Reviewed: 5/14/2015  © 3419-6815  [employer]. [address] [place] [address], [place] [address]. All rights reserved. This information is not intended as a substitute for professional medical care. Always follow your healthcare professional's instructions.

## 2017-07-12 NOTE — LETTER
July 12, 2017      Isabella Urbina MD  [address]  [place] [address]           [facility]  [address]  [place] [address]  Phone: [phone]          Patient: Geovani Evans Jr.   MR Number: [id number]   YOB: 1945   Date of Visit: 7/12/2017       Dear Dr. Isabella Urbina:    Thank you for referring Geovani Evans to me for evaluation. Attached you will find relevant portions of my assessment and plan of care.    If you have questions, please do not hesitate to call me. I look forward to following Geovani Evans along with you.    Sincerely,    Camilla Simmons, SHAE    Enclosure  CC:  No Recipients    If you would like to receive this communication electronically, please contact [email or web address] or [phone] to request more information on [facility] Link access.    For providers and/or their staff who would like to refer a patient to [facility], please contact us through our one-stop-shop provider referral line, [facility], at [phone].    If you feel you have received this communication in error or would no longer like to receive these types of communications, please e-mail [email or web address]

## 2017-07-13 NOTE — OP NOTE
DATE OF PROCEDURE:  06/27/2017.    SURGEON:  Patricio Cruz M.D.    PREOPERATIVE DIAGNOSIS:  Nucleosclerotic cataract, left eye.     POSTOPERATIVE DIAGNOSIS:  Nucleosclerotic cataract, left eye.    PROCEDURE:  Clear corneal phacoemulsification with posterior chamber intraocular   lens implant, left eye.    ANESTHESIA:  Monitored anesthesia care with 2% Xylocaine jelly topically, 1%   free lidocaine topically and intrachamberly.    PROCEDURE IN DETAIL:  After the appropriate preoperative consent was obtained,   the patient had the 2% Xylocaine jelly applied to the cornea.  The patient was   then brought to the operating room and placed into the supine position.  The   left periorbit was then prepped and draped in the usual sterile ophthalmic   fashion.  A lid speculum was then inserted into the left eye.  Several drops of   the 1% lidocaine were placed onto the left cornea.  The 1% lidocaine was also   applied to the perilimbal region with the Weck-rocio sponge.  Using the 0.12-mm   forceps and the Super Sharp blade, a paracentesis site was made at the 6 o'clock   position.  Approximately 0.5 mL of the 1% lidocaine was injected into the   anterior chamber.  Next, Viscoat was injected into the anterior chamber through   the paracentesis site.  The 2.75-mm keratome and the cyclodialysis spatula were   used to create a 2.75-mm clear corneal temporal groove.  The cystitomy needle   and Utrata forceps were then used to create a continuous tear 360-degree   capsulorrhexis.  BSS in a cannula was then used for hydrodissection.    Phacoemulsification then proceeded in a stop-and-chop fashion without any   complications.  Another 0.5 mL of the 1% lidocaine was injected into the   anterior chamber.  The curved tip irrigation aspiration handpiece was then used   to remove the residual cortical material from the capsular bag.  Again, the 1%   lidocaine was applied to the perilimbal region with the Weck-rocio sponge.  Healon   GV  was then injected into the anterior chamber and capsular bag.  An [employer] intraocular lens model SN60WF, 18.5 diopters in power, and serial   #[id number] was injected.  The Sinskey hook was used to position the lens into   its proper place.  The residual viscoelastic material was removed from the   anterior chamber and capsular bag with the curved tip irrigation aspiration   handpiece.  Both wounds were hydrated with BSS on a cannula.  Both wounds were   checked and found to be watertight.  The lid speculum was then removed from the   left eye.  The patient then had 1 drop of Vigamox ophthalmic drop and 1 drop of   Econopred +1% ophthalmic drop instilled onto the left cornea.  The eye was then   shielded.  The patient tolerated the procedure well and was then brought to the   recovery room in good condition.      SUSSY  dd: 06/27/2017 18:30:00 (CDT)  td: 07/13/2017 10:27:06 (CDT)  Doc ID   #[id number]  Job ID #[id number]    CC:

## 2017-07-26 ENCOUNTER — OFFICE VISIT (OUTPATIENT)
Dept: OPHTHALMOLOGY | Facility: CLINIC | Age: 72
End: 2017-07-26
Payer: MEDICARE

## 2017-07-26 DIAGNOSIS — H35.342 MACULAR HOLE, LEFT EYE: ICD-10-CM

## 2017-07-26 DIAGNOSIS — H25.11 NUCLEAR SCLEROSIS, RIGHT: ICD-10-CM

## 2017-07-26 DIAGNOSIS — Z98.890 POST-OPERATIVE STATE: Primary | ICD-10-CM

## 2017-07-26 DIAGNOSIS — H52.7 REFRACTIVE ERROR: ICD-10-CM

## 2017-07-26 PROCEDURE — 99999 PR PBB SHADOW E&M-EST. PATIENT-LVL II: CPT | Mod: PBBFAC,,, | Performed by: OPHTHALMOLOGY

## 2017-07-26 PROCEDURE — 99024 POSTOP FOLLOW-UP VISIT: CPT | Mod: S$GLB,,, | Performed by: OPHTHALMOLOGY

## 2017-07-26 NOTE — PROGRESS NOTES
Subjective:       Patient ID: Geovani Evans Jr. is a 71 y.o. male.    Chief Complaint: Post-op Evaluation (3 weeks po os)    HPI  Review of Systems    Objective:      Physical Exam    Assessment:       1. Post-operative state    2. Nuclear sclerosis, right    3. Macular hole, left eye    4. Refractive error        Plan:       S/p CE OS- Doing well.     Cataract OD- Not visually significant per Pt.  Macular hole OS s/p repair per Dr Gallagher-Doing well.  RE-Pt wants MRx.        Give MRx.  RTC 6 mos.

## 2017-09-06 ENCOUNTER — OFFICE VISIT (OUTPATIENT)
Dept: INTERNAL MEDICINE | Facility: CLINIC | Age: 72
End: 2017-09-06
Payer: MEDICARE

## 2017-09-06 VITALS
HEIGHT: 69 IN | DIASTOLIC BLOOD PRESSURE: 80 MMHG | BODY MASS INDEX: 33.93 KG/M2 | WEIGHT: 229.06 LBS | SYSTOLIC BLOOD PRESSURE: 120 MMHG | HEART RATE: 72 BPM

## 2017-09-06 DIAGNOSIS — E04.2 MULTINODULAR GOITER (NONTOXIC): ICD-10-CM

## 2017-09-06 DIAGNOSIS — Z00.00 ENCOUNTER FOR PREVENTIVE HEALTH EXAMINATION: Primary | ICD-10-CM

## 2017-09-06 DIAGNOSIS — I10 ESSENTIAL HYPERTENSION: ICD-10-CM

## 2017-09-06 DIAGNOSIS — H35.033 HYPERTENSIVE RETINOPATHY OF BOTH EYES: ICD-10-CM

## 2017-09-06 DIAGNOSIS — E66.9 OBESITY, CLASS I, BMI 30-34.9: ICD-10-CM

## 2017-09-06 DIAGNOSIS — E78.5 HYPERLIPIDEMIA, UNSPECIFIED HYPERLIPIDEMIA TYPE: ICD-10-CM

## 2017-09-06 DIAGNOSIS — D64.9 ANEMIA, UNSPECIFIED TYPE: ICD-10-CM

## 2017-09-06 DIAGNOSIS — H35.342 MACULAR HOLE, LEFT: ICD-10-CM

## 2017-09-06 DIAGNOSIS — E29.1 MALE HYPOGONADISM: ICD-10-CM

## 2017-09-06 PROBLEM — Z98.890 POST-OPERATIVE STATE: Status: RESOLVED | Noted: 2017-06-28 | Resolved: 2017-09-06

## 2017-09-06 PROCEDURE — G0439 PPPS, SUBSEQ VISIT: HCPCS | Mod: S$GLB,,, | Performed by: NURSE PRACTITIONER

## 2017-09-06 PROCEDURE — 99499 UNLISTED E&M SERVICE: CPT | Mod: S$GLB,,, | Performed by: NURSE PRACTITIONER

## 2017-09-06 PROCEDURE — 99999 PR PBB SHADOW E&M-EST. PATIENT-LVL III: CPT | Mod: PBBFAC,,, | Performed by: NURSE PRACTITIONER

## 2017-09-06 NOTE — PROGRESS NOTES
"Geovani Evans presented for a  Medicare AWV and comprehensive Health Risk Assessment today. The following components were reviewed and updated:    · Medical history  · Family History  · Social history  · Allergies and Current Medications  · Health Risk Assessment  · Health Maintenance  · Care Team     ** See Completed Assessments for Annual Wellness Visit within the encounter summary.**       The following assessments were completed:  · Living Situation  · CAGE  · Depression Screening  · Timed Get Up and Go  · Whisper Test  · Cognitive Function Screening  · Nutrition Screening  · ADL Screening  · PAQ Screening            Vitals:    09/06/17 0859   BP: 120/80   BP Location: Left arm   Patient Position: Sitting   Pulse: 72   Weight: 103.9 kg (229 lb 0.9 oz)   Height: 5' 9" (1.753 m)     Body mass index is 33.83 kg/m².     Physical Exam   Constitutional: He is oriented to person, place, and time. He appears well-developed. No distress.   HENT:   Head: Normocephalic and atraumatic.   Right Ear: No decreased hearing is noted.   Left Ear: Decreased hearing is noted.   Cardiovascular: Normal rate, regular rhythm, normal heart sounds and intact distal pulses.    No murmur heard.  Pulmonary/Chest: Effort normal and breath sounds normal. No respiratory distress. He has no wheezes. He has no rales.   Musculoskeletal: He exhibits no edema, tenderness or deformity.   Neurological: He is alert and oriented to person, place, and time.   Skin: Skin is warm and dry. He is not diaphoretic.   Psychiatric: He has a normal mood and affect. His behavior is normal. He expresses no homicidal and no suicidal ideation. He expresses no suicidal plans and no homicidal plans.   Vitals reviewed.        Diagnoses and health risks identified today and associated recommendations/orders:    1. Encounter for preventive health examination  Flu vaccine due in fall.  Abnormal Whisper Test-declined further evaluation.  Declined Zoster vaccine.    2. " Essential hypertension  Stable.   Low sodium diet.   Continue current medications.  Followed by PCP.     3. Obesity, Class I, BMI 30-34.9  Stable.   Exercise as tolerated.  Work on weight loss.  Followed by PCP.     4. Anemia, unspecified type  Stable.   Followed by PCP.     5. Hyperlipidemia, unspecified hyperlipidemia type  Stable.   Followed by PCP.     6. Multinodular goiter (nontoxic)  Stable.   Followed by Endo.     7. Hypertensive retinopathy of both eyes  Stable.   Followed by Ophthalmology.     8. Macular hole, left  Stable.   Followed by Ophthalmology.     9. Male hypogonadism  Stable.   Followed by Endo.       Provided Olivo with a 5-10 year written screening schedule and personal prevention plan. Recommendations were developed using the USPSTF age appropriate recommendations. Education, counseling, and referrals were provided as needed. After Visit Summary printed and given to patient which includes a list of additional screenings\tests needed.    Return in about 5 weeks (around 10/11/2017) for follow up with PCP. Return in 1 year for HRA.    Kecia Kenny NP

## 2017-09-06 NOTE — PATIENT INSTRUCTIONS
Counseling and Referral of Other Preventative  (Italic type indicates deductible and co-insurance are waived)    Patient Name: Geovani Evans  Today's Date: 9/6/2017      SERVICE LIMITATIONS RECOMMENDATION    Vaccines    · Pneumococcal (once after 65)    · Influenza (annually)    · Hepatitis B (if medium/high risk)    · Prevnar 13      Hepatitis B medium/high risk factors:       - End-stage renal disease       - Hemophiliacs who received Factor VII or         IX concentrates       - Clients of institutions for the mentally             retarded       - Persons who live in the same house as          a HepB carrier       - Homosexual men       - Illicit injectable drug abusers     Pneumococcal: Done, no repeat necessary     Influenza: Due in fall     Hepatitis B: N/A     Prevnar 13: Done, no repeat necessary    Prostate cancer screening (annually to age 75)     Prostate specific antigen (PSA) Shared decision making with Provider. Sometimes a co-pay may be required if the patient decides to have this test. The USPSTF no longer recommends prostate cancer screening routinely in medicine: every 1 year    Colorectal cancer screening (to age 75)    · Fecal occult blood test (annual)  · Flexible sigmoidoscopy (5y)  · Screening colonoscopy (10y)  · Barium enema   Last done 7/2011, recommend to repeat every 10  years    Diabetes self-management training (no USPSTF recommendations)  Requires referral by treating physician for patient with diabetes or renal disease. 10 hours of initial DSMT sessions of no less than 30 minutes each in a continuous 12-month period. 2 hours of follow-up DSMT in subsequent years.  N/A    Glaucoma screening (no USPSTF recommendation)  Diabetes mellitus, family history   , age 50 or over    American, age 65 or over  Done this year, repeat every year    Medical nutrition therapy for diabetes or renal disease (no recommended schedule)  Requires referral by treating physician for  patient with diabetes or renal disease or kidney transplant within the past 3 years.  Can be provided in same year as diabetes self-management training (DSMT), and CMS recommends medical nutrition therapy take place after DSMT. Up to 3 hours for initial year and 2 hours in subsequent years.  N/A    Cardiovascular screening blood tests (every 5 years)  · Fasting lipid panel  Order as a panel if possible  Last done 8/2016, recommend to repeat every 5  years    Diabetes screening tests (at least every 3 years, Medicare covers annually or at 6-month intervals for prediabetic patients)  · Fasting blood sugar (FBS) or glucose tolerance test (GTT)  Patient must be diagnosed with one of the following:       - Hypertension       - Dyslipidemia       - Obesity (BMI 30kg/m2)       - Previous elevated impaired FBS or GTT       ... or any two of the following:       - Overweight (BMI 25 but <30)       - Family history of diabetes       - Age 65 or older       - History of gestational diabetes or birth of baby weighing more than 9 pounds  Last done 8/2016, recommend to repeat every  year    Abdominal aortic aneurysm screening (once)  · Sonogram   Limited to patients who meet one of the following criteria:       - Men who are 65-75 years old and have smoked more than 100 cigarette in their lifetime       - Anyone with a family history of abdominal aortic aneurysm       - Anyone recommended for screening by the USPSTF  N/A    HIV screening (annually for increased risk patients)  · HIV-1 and HIV-2 by EIA, or RICK, rapid antibody test or oral mucosa transudate  Patients must be at increased risk for HIV infection per USPSTF guidelines or pregnant. Tests covered annually for patient at increased risk or as requested by the patient. Pregnant patients may receive up to 3 tests during pregnancy.  Risks discussed, screening is not recommended    Smoking cessation counseling (up to 8 sessions per year)  Patients must be asymptomatic of  tobacco-related conditions to receive as a preventative service.  Non-smoker    Subsequent annual wellness visit  At least 12 months since last AWV  Return in one year     The following information is provided to all patients.  This information is to help you find resources for any of the problems found today that may be affecting your health:                Living healthy guide: www.[facility].[place].[facility]      Understanding Diabetes: www.diabetes.org      Eating healthy: www.cdc.gov/healthyweight      CDC home safety checklist: www.cdc.gov/steadi/patient.html      Agency on Aging: www.goea.[place].[facility]      Alcoholics anonymous (AA): www.aa.org      Physical Activity: www.sophie.nih.gov/iv1aixx      Tobacco use: www.quitwithusla.org

## 2017-09-18 ENCOUNTER — OFFICE VISIT (OUTPATIENT)
Dept: ORTHOPEDICS | Facility: CLINIC | Age: 72
End: 2017-09-18
Payer: MEDICARE

## 2017-09-18 VITALS — HEIGHT: 69 IN | WEIGHT: 230.81 LBS | BODY MASS INDEX: 34.18 KG/M2

## 2017-09-18 DIAGNOSIS — M20.21 ACQUIRED HALLUX RIGIDUS, RIGHT: Primary | ICD-10-CM

## 2017-09-18 PROCEDURE — 99999 PR PBB SHADOW E&M-EST. PATIENT-LVL II: CPT | Mod: PBBFAC,,, | Performed by: ORTHOPAEDIC SURGERY

## 2017-09-18 PROCEDURE — 1159F MED LIST DOCD IN RCRD: CPT | Mod: S$GLB,,, | Performed by: ORTHOPAEDIC SURGERY

## 2017-09-18 PROCEDURE — 99203 OFFICE O/P NEW LOW 30 MIN: CPT | Mod: S$GLB,,, | Performed by: ORTHOPAEDIC SURGERY

## 2017-09-18 PROCEDURE — 3008F BODY MASS INDEX DOCD: CPT | Mod: S$GLB,,, | Performed by: ORTHOPAEDIC SURGERY

## 2017-09-18 PROCEDURE — 1125F AMNT PAIN NOTED PAIN PRSNT: CPT | Mod: S$GLB,,, | Performed by: ORTHOPAEDIC SURGERY

## 2017-09-18 RX ORDER — NAPROXEN 500 MG/1
500 TABLET ORAL EVERY 12 HOURS PRN
Qty: 60 TABLET | Refills: 2 | Status: SHIPPED | OUTPATIENT
Start: 2017-09-18 | End: 2017-10-18

## 2017-09-18 NOTE — PROGRESS NOTES
DATE: 9/18/2017  PATIENT: Geovani Evans Jr.    CHIEF COMPLAINT: right great toe pain    HISTORY:  Geovani Evans Jr. is a 71 y.o. male  here for initial evaluation of right great toe pain. The pain has been present for 1-2 months. There is no history of trauma. The patient describes the pain as achy and occasionally sharp.  The pain is worse with movement of great toe and ambulation and improved by rest. There is no associated numbness and tingling.  Prior treatments have included tylenol and medrol dose pack.      PAST MEDICAL/SURGICAL HISTORY:  Past Medical History:   Diagnosis Date    Anemia     Hypertension     Hypogonadism male     Obesity     Thyroid nodule     neg bx 2/2011     Past Surgical History:   Procedure Laterality Date    CATARACT EXTRACTION W/  INTRAOCULAR LENS IMPLANT Left 06/27/2017    OS (Dr. Cruz)    FRACTURE SURGERY  1970    left great toe. bunion repair/shaved bone       Current Medications:   Current Outpatient Prescriptions:     lisinopril-hydrochlorothiazide (PRINZIDE,ZESTORETIC) 10-12.5 mg per tablet, Take 1 tablet by mouth once daily., Disp: 90 tablet, Rfl: 3    Social History:   Social History     Social History    Marital status:      Spouse name: N/A    Number of children: N/A    Years of education: N/A     Occupational History    Not on file.     Social History Main Topics    Smoking status: Never Smoker    Smokeless tobacco: Never Used    Alcohol use No    Drug use: No    Sexual activity: Not Currently     Other Topics Concern    Not on file     Social History Narrative    No narrative on file       REVIEW OF SYSTEMS:  Constitution: Negative. Negative for chills, fever and night sweats.   Cardiovascular: Negative for chest pain and syncope.   Respiratory: Negative for cough and shortness of breath.   Gastrointestinal: See HPI. Negative for nausea/vomiting. Negative for abdominal pain.  Genitourinary: See HPI. Negative for discoloration or  "dysuria.  Skin: Negative for dry skin, itching and rash.   Hematologic/Lymphatic: Negative for bleeding problem. Does not bruise/bleed easily.   Musculoskeletal: Negative for falls and muscle weakness.   Neurological: See HPI. No seizures.   Endocrine: Negative for polydipsia, polyphagia and polyuria.   Allergic/Immunologic: Negative for hives and persistent infections.    PHYSICAL EXAMINATION:    Ht 5' 9" (1.753 m)   Wt 104.7 kg (230 lb 13.2 oz)   BMI 34.09 kg/m²     General: The patient is a  71 y.o. male in no apparent distress, the patient is orientatied to person, place and time.   Psych: Normal mood and affect  HEENT:  NCAT  Lungs:  Respirations are equal and unlabored.  CV:  2+ bilateral upper and lower extremity pulses.  Skin:  Intact throughout.    MSK:    RLE:  - mild swelling about 1st MTPJ, decreased ROM MTPJ, pain on rom  - TA/EHL/Gastroc/FHL assessed in isolation without deficit  - SILT throughout  - DP and PT palpated  2+  - Capillary Refill <3s          IMAGING:     Radiographs of the bilateral feet were ordered and personally reviewed with the patient today.  They show hallux rigiduswith DJD, joint space narrowing, sclerosis.    ASSESSMENT/PLAN:    Geovani was seen today for pain and pain.    Diagnoses and all orders for this visit:    Acquired hallux rigidus, right  -     naproxen (NAPROSYN) 500 MG tablet; Take 1 tablet (500 mg total) by mouth every 12 (twelve) hours as needed.      No Follow-up on file.    Right hallux rigidus.  Pt not interested in pursuing surgical treatment currently.  Will begin NSAIDs for treatment.  Pt will f/u in 2 months.      I have personally taken the history and examined this patient and agree with the residents note as stated above.      "

## 2017-10-11 ENCOUNTER — OFFICE VISIT (OUTPATIENT)
Dept: INTERNAL MEDICINE | Facility: CLINIC | Age: 72
End: 2017-10-11
Payer: MEDICARE

## 2017-10-11 ENCOUNTER — IMMUNIZATION (OUTPATIENT)
Dept: INTERNAL MEDICINE | Facility: CLINIC | Age: 72
End: 2017-10-11
Payer: MEDICARE

## 2017-10-11 VITALS
RESPIRATION RATE: 97 BRPM | WEIGHT: 228.5 LBS | HEIGHT: 69 IN | DIASTOLIC BLOOD PRESSURE: 84 MMHG | SYSTOLIC BLOOD PRESSURE: 128 MMHG | HEART RATE: 63 BPM | BODY MASS INDEX: 33.84 KG/M2

## 2017-10-11 DIAGNOSIS — E78.5 HYPERLIPIDEMIA, UNSPECIFIED HYPERLIPIDEMIA TYPE: ICD-10-CM

## 2017-10-11 DIAGNOSIS — E29.1 HYPOGONADISM MALE: ICD-10-CM

## 2017-10-11 DIAGNOSIS — D64.9 ANEMIA, UNSPECIFIED TYPE: ICD-10-CM

## 2017-10-11 DIAGNOSIS — Z12.5 PROSTATE CANCER SCREENING: ICD-10-CM

## 2017-10-11 DIAGNOSIS — E04.2 MULTINODULAR GOITER (NONTOXIC): ICD-10-CM

## 2017-10-11 DIAGNOSIS — Z00.00 ANNUAL PHYSICAL EXAM: Primary | ICD-10-CM

## 2017-10-11 DIAGNOSIS — I10 ESSENTIAL HYPERTENSION: ICD-10-CM

## 2017-10-11 PROCEDURE — 99999 PR PBB SHADOW E&M-EST. PATIENT-LVL IV: CPT | Mod: PBBFAC,,, | Performed by: INTERNAL MEDICINE

## 2017-10-11 PROCEDURE — G0008 ADMIN INFLUENZA VIRUS VAC: HCPCS | Mod: S$GLB,,, | Performed by: INTERNAL MEDICINE

## 2017-10-11 PROCEDURE — 90662 IIV NO PRSV INCREASED AG IM: CPT | Mod: S$GLB,,, | Performed by: INTERNAL MEDICINE

## 2017-10-11 PROCEDURE — 99397 PER PM REEVAL EST PAT 65+ YR: CPT | Mod: S$GLB,,, | Performed by: INTERNAL MEDICINE

## 2017-10-11 PROCEDURE — 99499 UNLISTED E&M SERVICE: CPT | Mod: S$GLB,,, | Performed by: INTERNAL MEDICINE

## 2017-10-11 RX ORDER — LISINOPRIL AND HYDROCHLOROTHIAZIDE 10; 12.5 MG/1; MG/1
1 TABLET ORAL DAILY
Qty: 90 TABLET | Refills: 3 | Status: SHIPPED | OUTPATIENT
Start: 2017-10-11 | End: 2018-10-15 | Stop reason: SDUPTHER

## 2017-10-11 NOTE — PROGRESS NOTES
Subjective:       Patient ID: Geovani Evans Jr. is a 71 y.o. male.    Chief Complaint: Annual Exam and Dizziness    HPI   2 weeks ago awoke with dizziness and vomiting.  Took Dramamine and nausea resolved.  Dizziness is improving.   Nothing at all today.  True vertigo.  [place]on seasick.  1st episode. Worse with head turning.  No headache.    C/o pain in both feet due to OA.  Tx with naprosyn.  Qustionable gout.      Long standing htn. Well controlled.     H/o multinod goiter and neg bx of nodule last year.  Dr Orozco requested US and f/u in a year  No symptoms of hypo os hyperthyroidism    Also with chronic hypogonadism, which he has declined to tx.  Applied  testosterone in past.  Review of Systems   Constitutional: Negative for activity change and unexpected weight change.   HENT: Negative for postnasal drip, rhinorrhea and sinus pressure.    Respiratory: Negative for chest tightness and shortness of breath.    Cardiovascular: Negative for chest pain and leg swelling.   Gastrointestinal: Negative for abdominal pain, nausea and vomiting.   Genitourinary: Negative for difficulty urinating, dysuria, hematuria and urgency.   Skin: Negative for rash.   Neurological: Negative for headaches.   Psychiatric/Behavioral: Negative for dysphoric mood and sleep disturbance. The patient is not nervous/anxious.        Objective:      Physical Exam   Constitutional: He is oriented to person, place, and time. He appears well-developed and well-nourished.   Eyes: No scleral icterus.   Neck: No JVD present. No thyromegaly present.   Cardiovascular: Normal rate, regular rhythm and normal heart sounds.    Pulmonary/Chest: Effort normal and breath sounds normal. No respiratory distress. He has no wheezes. He has no rales.   Abdominal: Soft. He exhibits no mass. There is no tenderness.   Musculoskeletal: He exhibits no edema.   Neurological: He is alert and oriented to person, place, and time. No cranial nerve deficit. Coordination normal.    No nystagmus  Neg clau-hallpike maneuver   Skin: No rash noted.   Psychiatric: He has a normal mood and affect. His behavior is normal.       Assessment:       1. Annual physical exam    2. Anemia, unspecified type    3. Hyperlipidemia, unspecified hyperlipidemia type    4. Essential hypertension    5. Multinodular goiter (nontoxic)    6. Prostate cancer screening    7. Hypogonadism male     7.     Vertigo - resolved  Plan:       Geovani was seen today for annual exam and dizziness.    Diagnoses and all orders for this visit:    Annual physical exam    Anemia, unspecified type  -     CBC auto differential; Future    Hyperlipidemia, unspecified hyperlipidemia type  -     Lipid panel; Future    Essential hypertension  -     Comprehensive metabolic panel; Future    Multinodular goiter (nontoxic)  -     TSH; Future  -     Ambulatory consult to Endocrinology  -     US Soft Tissue Head Neck Thyroid; Future    Prostate cancer screening  -     PSA, Screening; Future    Hypogonadism male    Other orders  -     lisinopril-hydrochlorothiazide (PRINZIDE,ZESTORETIC) 10-12.5 mg per tablet; Take 1 tablet by mouth once daily.

## 2017-10-12 ENCOUNTER — LAB VISIT (OUTPATIENT)
Dept: LAB | Facility: HOSPITAL | Age: 72
End: 2017-10-12
Attending: INTERNAL MEDICINE
Payer: MEDICARE

## 2017-10-12 DIAGNOSIS — E78.5 HYPERLIPIDEMIA, UNSPECIFIED HYPERLIPIDEMIA TYPE: ICD-10-CM

## 2017-10-12 DIAGNOSIS — D64.9 ANEMIA, UNSPECIFIED TYPE: ICD-10-CM

## 2017-10-12 DIAGNOSIS — E04.2 MULTINODULAR GOITER (NONTOXIC): ICD-10-CM

## 2017-10-12 DIAGNOSIS — I10 ESSENTIAL HYPERTENSION: ICD-10-CM

## 2017-10-12 DIAGNOSIS — Z12.5 PROSTATE CANCER SCREENING: ICD-10-CM

## 2017-10-12 LAB
ALBUMIN SERPL BCP-MCNC: 3.6 G/DL
ALP SERPL-CCNC: 59 U/L
ALT SERPL W/O P-5'-P-CCNC: 19 U/L
ANION GAP SERPL CALC-SCNC: 8 MMOL/L
AST SERPL-CCNC: 18 U/L
BASOPHILS # BLD AUTO: 0.03 K/UL
BASOPHILS NFR BLD: 0.4 %
BILIRUB SERPL-MCNC: 0.4 MG/DL
BUN SERPL-MCNC: 13 MG/DL
CALCIUM SERPL-MCNC: 9.4 MG/DL
CHLORIDE SERPL-SCNC: 105 MMOL/L
CHOLEST SERPL-MCNC: 192 MG/DL
CHOLEST/HDLC SERPL: 5.5 {RATIO}
CO2 SERPL-SCNC: 30 MMOL/L
COMPLEXED PSA SERPL-MCNC: 3.5 NG/ML
CREAT SERPL-MCNC: 1.1 MG/DL
DIFFERENTIAL METHOD: ABNORMAL
EOSINOPHIL # BLD AUTO: 0.2 K/UL
EOSINOPHIL NFR BLD: 2.1 %
ERYTHROCYTE [DISTWIDTH] IN BLOOD BY AUTOMATED COUNT: 13.4 %
EST. GFR  (AFRICAN AMERICAN): >60 ML/MIN/1.73 M^2
EST. GFR  (NON AFRICAN AMERICAN): >60 ML/MIN/1.73 M^2
GLUCOSE SERPL-MCNC: 99 MG/DL
HCT VFR BLD AUTO: 34.3 %
HDLC SERPL-MCNC: 35 MG/DL
HDLC SERPL: 18.2 %
HGB BLD-MCNC: 11 G/DL
LDLC SERPL CALC-MCNC: 124 MG/DL
LYMPHOCYTES # BLD AUTO: 2.6 K/UL
LYMPHOCYTES NFR BLD: 34.8 %
MCH RBC QN AUTO: 27.8 PG
MCHC RBC AUTO-ENTMCNC: 32.1 G/DL
MCV RBC AUTO: 87 FL
MONOCYTES # BLD AUTO: 0.6 K/UL
MONOCYTES NFR BLD: 7.9 %
NEUTROPHILS # BLD AUTO: 4.1 K/UL
NEUTROPHILS NFR BLD: 54.5 %
NONHDLC SERPL-MCNC: 157 MG/DL
PLATELET # BLD AUTO: 229 K/UL
PMV BLD AUTO: 10.9 FL
POTASSIUM SERPL-SCNC: 4.1 MMOL/L
PROT SERPL-MCNC: 7.5 G/DL
RBC # BLD AUTO: 3.95 M/UL
SODIUM SERPL-SCNC: 143 MMOL/L
TRIGL SERPL-MCNC: 165 MG/DL
TSH SERPL DL<=0.005 MIU/L-ACNC: 1.63 UIU/ML
WBC # BLD AUTO: 7.47 K/UL

## 2017-10-12 PROCEDURE — 80053 COMPREHEN METABOLIC PANEL: CPT

## 2017-10-12 PROCEDURE — 85025 COMPLETE CBC W/AUTO DIFF WBC: CPT

## 2017-10-12 PROCEDURE — 84443 ASSAY THYROID STIM HORMONE: CPT

## 2017-10-12 PROCEDURE — 80061 LIPID PANEL: CPT

## 2017-10-12 PROCEDURE — 84153 ASSAY OF PSA TOTAL: CPT

## 2017-10-12 PROCEDURE — 36415 COLL VENOUS BLD VENIPUNCTURE: CPT

## 2017-10-16 ENCOUNTER — HOSPITAL ENCOUNTER (OUTPATIENT)
Dept: RADIOLOGY | Facility: HOSPITAL | Age: 72
Discharge: HOME | End: 2017-10-16
Attending: INTERNAL MEDICINE
Payer: MEDICARE

## 2017-10-16 DIAGNOSIS — E04.2 MULTINODULAR GOITER (NONTOXIC): ICD-10-CM

## 2017-10-16 PROCEDURE — 76536 US EXAM OF HEAD AND NECK: CPT | Mod: TC

## 2017-10-16 PROCEDURE — 76536 US EXAM OF HEAD AND NECK: CPT | Mod: 26,,, | Performed by: RADIOLOGY

## 2017-10-22 ENCOUNTER — TELEPHONE (OUTPATIENT)
Dept: INTERNAL MEDICINE | Facility: CLINIC | Age: 72
End: 2017-10-22

## 2017-10-22 DIAGNOSIS — E04.2 MULTINODULAR GOITER: Primary | ICD-10-CM

## 2017-10-22 NOTE — TELEPHONE ENCOUNTER
pls call - labs fine except for mild anemia, as he as had for a while, likely due to low testosterone.

## 2017-11-06 ENCOUNTER — OFFICE VISIT (OUTPATIENT)
Dept: ENDOCRINOLOGY | Facility: CLINIC | Age: 72
End: 2017-11-06
Payer: MEDICARE

## 2017-11-06 VITALS
RESPIRATION RATE: 16 BRPM | DIASTOLIC BLOOD PRESSURE: 82 MMHG | WEIGHT: 231.69 LBS | BODY MASS INDEX: 34.32 KG/M2 | SYSTOLIC BLOOD PRESSURE: 118 MMHG | HEART RATE: 68 BPM | HEIGHT: 69 IN

## 2017-11-06 DIAGNOSIS — I10 ESSENTIAL HYPERTENSION: ICD-10-CM

## 2017-11-06 DIAGNOSIS — E66.9 OBESITY, CLASS I, BMI 30-34.9: ICD-10-CM

## 2017-11-06 DIAGNOSIS — E04.2 MULTINODULAR GOITER (NONTOXIC): Primary | ICD-10-CM

## 2017-11-06 PROCEDURE — 99999 PR PBB SHADOW E&M-EST. PATIENT-LVL III: CPT | Mod: PBBFAC,,, | Performed by: INTERNAL MEDICINE

## 2017-11-06 PROCEDURE — 99214 OFFICE O/P EST MOD 30 MIN: CPT | Mod: S$GLB,,, | Performed by: INTERNAL MEDICINE

## 2017-11-06 NOTE — LETTER
November 6, 2017      Julia Fonseca MD  [address]  [facility] [address]           Joseph Santa - Endo/Diab/Metab  [address]  [facility] [address]  Phone: [phone]  Fax: [phone]          Patient: Geovani Evans Jr.   MR Number: [id number]   YOB: 1945   Date of Visit: 11/6/2017       Dear Dr. Julia Fonseca:    Thank you for referring Geovani Evans to me for evaluation. Attached you will find relevant portions of my assessment and plan of care.    If you have questions, please do not hesitate to call me. I look forward to following Geovani Evans along with you.    Sincerely,    Krysta Yousif, STEVE    Enclosure  CC:  No Recipients    If you would like to receive this communication electronically, please contact [email or web address] or [phone] to request more information on [email or web address] Link access.    For providers and/or their staff who would like to refer a patient to [facility], please contact us through our one-stop-shop provider referral line, [facility], at [phone].    If you feel you have received this communication in error or would no longer like to receive these types of communications, please e-mail [email or web address]

## 2017-11-06 NOTE — PROGRESS NOTES
Subjective:      Patient ID: Geovani Evans Jr. is a 72 y.o. male.    Chief Complaint:  Goiter    History of Present Illness  Mr. Evans presents today for evaluation and management of thyroid nodules.   Previously followed by Dr. Elizabeth. Last office visit with Dr. Orozco in 09/2016   This is the patient's initial visit with me today     He has MNG with last thyroid ultrasound in 10/2017     Previous benign FNA x 2 in 2011 and 2016   Per Dr. Elizabeth's note - left lobe thyroid nodule was benign in 2011   Dr. Orozco biopsied the right thyroid nodule in 2016 - also benign     The patient denies compressive symptoms   Denies dysphagia   Denies shortness of breath in the recumbent position   Denies voice changes     He denies family history of thyroid disease or thyroid cancer     Patient was last seen by Dr. Elizabeth in endocrine clinic in 2/212.    Also with history of Hypogonadism   Was on testosterone gel for a time, but discontinued this due to gynecomastia and not getting much relief of symptoms. Patient is not interested in resuming treatment     Review of Systems   Constitutional: Negative for fatigue and unexpected weight change.   HENT: Negative for sore throat, trouble swallowing and voice change.    Eyes: Negative for visual disturbance.   Respiratory: Negative for shortness of breath.    Cardiovascular: Negative for chest pain.   Gastrointestinal: Negative for abdominal pain.   Endocrine: Negative for cold intolerance and heat intolerance.   Genitourinary: Negative for frequency.   Musculoskeletal: Negative for arthralgias.   Skin: Negative for rash.   Neurological: Negative for headaches.   Psychiatric/Behavioral: The patient is not nervous/anxious.      Objective:   Physical Exam   Constitutional: He is oriented to person, place, and time. He appears well-developed and well-nourished. No distress.   HENT:   Head: Normocephalic and atraumatic.   Neck: Normal range of motion. Neck supple. No tracheal deviation  present. Thyromegaly present.   Bilateral thyroid nodules palpated   Cardiovascular: Normal rate and regular rhythm.    Pulmonary/Chest: Effort normal and breath sounds normal.   Abdominal: Soft. Bowel sounds are normal. There is no tenderness.   Musculoskeletal: Normal range of motion. He exhibits no edema.   Lymphadenopathy:     He has no cervical adenopathy.   Neurological: He is alert and oriented to person, place, and time. He has normal reflexes. He displays normal reflexes.   Skin: Skin is warm and dry. No rash noted.   Psychiatric: He has a normal mood and affect. His behavior is normal.   Nursing note and vitals reviewed.    Lab Review:   Results for GENOVEVA GUERRA JR. (MRN [id number]) as of 11/6/2017 07:59   Ref. Range 10/12/2017 07:09   TSH Latest Ref Range: 0.400 - 4.000 uIU/mL 1.632     Results for GENOVEVA GUERRA JR. (MRN [id number]) as of 11/6/2017 07:59   Ref. Range 10/12/2017 07:09   Sodium Latest Ref Range: 136 - 145 mmol/L 143   Potassium Latest Ref Range: 3.5 - 5.1 mmol/L 4.1   Chloride Latest Ref Range: 95 - 110 mmol/L 105   CO2 Latest Ref Range: 23 - 29 mmol/L 30 (H)   Anion Gap Latest Ref Range: 8 - 16 mmol/L 8   BUN, Bld Latest Ref Range: 8 - 23 mg/dL 13   Creatinine Latest Ref Range: 0.5 - 1.4 mg/dL 1.1   eGFR if non African American Latest Ref Range: >60 mL/min/1.73 m^2 >60   eGFR if  Latest Ref Range: >60 mL/min/1.73 m^2 >60   Glucose Latest Ref Range: 70 - 110 mg/dL 99   Calcium Latest Ref Range: 8.7 - 10.5 mg/dL 9.4   Alkaline Phosphatase Latest Ref Range: 55 - 135 U/L 59   Total Protein Latest Ref Range: 6.0 - 8.4 g/dL 7.5   Albumin Latest Ref Range: 3.5 - 5.2 g/dL 3.6   Total Bilirubin Latest Ref Range: 0.1 - 1.0 mg/dL 0.4   AST Latest Ref Range: 10 - 40 U/L 18   ALT Latest Ref Range: 10 - 44 U/L 19     Results for GENOVEVA GUERRA JR. (MRN [id number]) as of 11/6/2017 07:59   Ref. Range 10/12/2017 07:09   Cholesterol Latest Ref Range: 120 - 199 mg/dL 192   HDL Latest Ref Range: 40  - 75 mg/dL 35 (L)   LDL Cholesterol Latest Ref Range: 63.0 - 159.0 mg/dL 124.0   Total Cholesterol/HDL Ratio Latest Ref Range: 2.0 - 5.0  5.5 (H)   Triglycerides Latest Ref Range: 30 - 150 mg/dL 165 (H)     Thyroid ultrasound 10/2017:   Impression       Multinodular thyroid with a previously sampled right thyroid nodule, and a mildly suspicious left thyroid nodule, one year followup is recommended.     Assessment:     1. Multinodular goiter (nontoxic)    2. Essential hypertension    3. Obesity, Class I, BMI 30-34.9      Plan:     1. Multinodular goiter ( nontoxic )   -- clinically and biochemically euthyroid   -- reviewed thyroid ultrasound from 2016 and 2017  -- reviewed indications for repeat FNA : interval change, compressive symptoms, prior history of abnormal FNA   -- all of the patient's questions were answered   -- will plan for repeat thyroid ultrasound study in 1 year     2. HTN   -- stable and controlled with current regimen   -- encouraged to follow a low salt diet   -- continue current meds     3. Obesity   -- alerted as to the increased risk of T2DM   -- encouraged dietary and lifestyle modifications   -- continue exercise program as tolerated       Follow up in 1 year with TSH and thyroid ultrasound prior

## 2018-04-17 ENCOUNTER — PES CALL (OUTPATIENT)
Dept: ADMINISTRATIVE | Facility: CLINIC | Age: 73
End: 2018-04-17

## 2018-05-02 ENCOUNTER — PES CALL (OUTPATIENT)
Dept: ADMINISTRATIVE | Facility: CLINIC | Age: 73
End: 2018-05-02

## 2018-05-03 ENCOUNTER — OFFICE VISIT (OUTPATIENT)
Dept: OPHTHALMOLOGY | Facility: CLINIC | Age: 73
End: 2018-05-03
Payer: MEDICARE

## 2018-05-03 DIAGNOSIS — H35.342 MACULAR HOLE, LEFT EYE: Primary | ICD-10-CM

## 2018-05-03 DIAGNOSIS — H35.033 HYPERTENSIVE RETINOPATHY OF BOTH EYES: ICD-10-CM

## 2018-05-03 PROCEDURE — 92134 CPTRZ OPH DX IMG PST SGM RTA: CPT | Mod: S$GLB,,, | Performed by: OPHTHALMOLOGY

## 2018-05-03 PROCEDURE — 99499 UNLISTED E&M SERVICE: CPT | Mod: S$PBB,,, | Performed by: OPHTHALMOLOGY

## 2018-05-03 PROCEDURE — 92014 COMPRE OPH EXAM EST PT 1/>: CPT | Mod: S$GLB,,, | Performed by: OPHTHALMOLOGY

## 2018-05-03 PROCEDURE — 99999 PR PBB SHADOW E&M-EST. PATIENT-LVL III: CPT | Mod: PBBFAC,,, | Performed by: OPHTHALMOLOGY

## 2018-05-03 PROCEDURE — 92226 PR SPECIAL EYE EXAM, SUBSEQUENT: CPT | Mod: LT,S$GLB,, | Performed by: OPHTHALMOLOGY

## 2018-05-03 NOTE — PROGRESS NOTES
HPI     Eye Problem    Additional comments: yearly check           Comments   DLS 4/27/17- No changes or new concerns x last visit      OCT -   OD - No ME  OS - MH closed    A/P    1. FTMH OS  Pt unsure of duration  On 3/15, VA OS 20/40  Some small RPE atrophy suggests some chronicity    s/p 25g PPV/MP/EL/AFx/C3F8 OS for FTMH and holes OS 12/7/16    Doing well, MH closed, IOP improved    2. NS OD  PCIOL OS - great result    3. HTN Ret OU  BP control      Retina PRN

## 2018-07-13 ENCOUNTER — OFFICE VISIT (OUTPATIENT)
Dept: ENDOCRINOLOGY | Facility: CLINIC | Age: 73
End: 2018-07-13
Payer: MEDICARE

## 2018-07-13 VITALS
WEIGHT: 232.13 LBS | DIASTOLIC BLOOD PRESSURE: 70 MMHG | SYSTOLIC BLOOD PRESSURE: 112 MMHG | BODY MASS INDEX: 34.38 KG/M2 | HEIGHT: 69 IN | HEART RATE: 80 BPM

## 2018-07-13 DIAGNOSIS — E66.9 OBESITY, CLASS I, BMI 30-34.9: ICD-10-CM

## 2018-07-13 DIAGNOSIS — I10 ESSENTIAL HYPERTENSION: ICD-10-CM

## 2018-07-13 DIAGNOSIS — E04.2 MULTINODULAR GOITER (NONTOXIC): Primary | ICD-10-CM

## 2018-07-13 PROCEDURE — 99214 OFFICE O/P EST MOD 30 MIN: CPT | Mod: S$GLB,,, | Performed by: INTERNAL MEDICINE

## 2018-07-13 PROCEDURE — 3078F DIAST BP <80 MM HG: CPT | Mod: CPTII,S$GLB,, | Performed by: INTERNAL MEDICINE

## 2018-07-13 PROCEDURE — 3074F SYST BP LT 130 MM HG: CPT | Mod: CPTII,S$GLB,, | Performed by: INTERNAL MEDICINE

## 2018-07-13 PROCEDURE — 99999 PR PBB SHADOW E&M-EST. PATIENT-LVL III: CPT | Mod: PBBFAC,,, | Performed by: INTERNAL MEDICINE

## 2018-07-13 NOTE — PROGRESS NOTES
Subjective:      Patient ID: Geovani Evans Jr. is a 72 y.o. male.    Chief Complaint:  Thyroid nodules     History of Present Illness  Mr. Evans presents today for evaluation and management of thyroid nodules.   Previously followed by Dr. Elizabeth and  Dr. Orozco   Last office visit with me in 11/2017     He has MNG with last thyroid ultrasound in 10/2017     Previous benign FNA x 2 in 2011 and 2016   Per Dr. Elizabeth's note - left lobe thyroid nodule was benign in 2011   Dr. Orozco biopsied the right thyroid nodule in 2016 - also benign     The patient denies compressive symptoms   Denies dysphagia   Denies shortness of breath in the recumbent position   Denies voice changes     Denies palpitations, fatigue, constipation, tremors,heat or cold intolerance   Reports some weight gain since last visit.     States his 88 year old mother recently passed away     He denies family history of thyroid disease or thyroid cancer     Also with history of Hypogonadism   Was on testosterone gel for a time, but discontinued this due to gynecomastia and not getting much relief of symptoms. Patient is not interested in resuming treatment     Review of Systems   Constitutional: Positive for unexpected weight change. Negative for fatigue.   HENT: Negative for sore throat, trouble swallowing and voice change.    Respiratory: Negative for shortness of breath.    Cardiovascular: Negative for chest pain and palpitations.   Gastrointestinal: Negative for abdominal pain, constipation and diarrhea.   Endocrine: Negative for cold intolerance and heat intolerance.   Psychiatric/Behavioral: The patient is not nervous/anxious.      Objective:   Physical Exam   Constitutional: He is oriented to person, place, and time. He appears well-developed and well-nourished. No distress.   Neck: Normal range of motion. Neck supple. Thyromegaly present.   Bilateral thyroid nodules palpated   Cardiovascular: Normal rate and regular rhythm.    Pulmonary/Chest:  Effort normal and breath sounds normal.   Musculoskeletal: Normal range of motion. He exhibits no edema.   Lymphadenopathy:     He has no cervical adenopathy.   Neurological: He is alert and oriented to person, place, and time. He has normal reflexes. He displays normal reflexes.   Skin: Skin is warm.   Psychiatric: He has a normal mood and affect. His behavior is normal.   Nursing note and vitals reviewed.    Lab Review:   Results for GENOVEVA GUERRA JR. (MRN [id number]) as of 7/13/2018 11:40   Ref. Range 10/12/2017 07:09   TSH Latest Ref Range: 0.400 - 4.000 uIU/mL 1.632     Thyroid ultrasound 10/2017:   Impression       Multinodular thyroid with a previously sampled right thyroid nodule, and a mildly suspicious left thyroid nodule, one year followup is recommended.     Assessment:     1. Multinodular goiter (nontoxic)    2. Obesity, Class I, BMI 30-34.9    3. Essential hypertension      Plan:     1. Multinodular goiter ( nontoxic )   -- clinically euthyroid   -- check TSH   -- reviewed thyroid ultrasound from 2016 and 2017  -- reviewed indications for repeat FNA : interval change, compressive symptoms, prior history of abnormal FNA   -- all of the patient's questions were answered   -- repeat thyroid USS     2. HTN   -- stable and controlled with current regimen   -- encouraged to follow a low salt diet   -- continue current meds  -- followed by PCP      3. Obesity   -- alerted as to the increased risk of T2DM   -- encouraged dietary and lifestyle modifications   -- continue exercise program as tolerated   -- recommend periodic A1c     Schedule TSH and thyroid USS - will call with results

## 2018-07-18 ENCOUNTER — PES CALL (OUTPATIENT)
Dept: ADMINISTRATIVE | Facility: CLINIC | Age: 73
End: 2018-07-18

## 2018-08-15 ENCOUNTER — OFFICE VISIT (OUTPATIENT)
Dept: INTERNAL MEDICINE | Facility: CLINIC | Age: 73
End: 2018-08-15
Payer: MEDICARE

## 2018-08-15 VITALS
DIASTOLIC BLOOD PRESSURE: 82 MMHG | BODY MASS INDEX: 33.63 KG/M2 | HEIGHT: 69 IN | SYSTOLIC BLOOD PRESSURE: 128 MMHG | HEART RATE: 64 BPM | WEIGHT: 227.06 LBS

## 2018-08-15 DIAGNOSIS — Z00.00 ENCOUNTER FOR PREVENTIVE HEALTH EXAMINATION: Primary | ICD-10-CM

## 2018-08-15 DIAGNOSIS — E78.5 HYPERLIPIDEMIA, UNSPECIFIED HYPERLIPIDEMIA TYPE: ICD-10-CM

## 2018-08-15 DIAGNOSIS — E66.9 OBESITY, CLASS I, BMI 30-34.9: ICD-10-CM

## 2018-08-15 DIAGNOSIS — E29.1 MALE HYPOGONADISM: ICD-10-CM

## 2018-08-15 DIAGNOSIS — E04.2 MULTINODULAR GOITER (NONTOXIC): ICD-10-CM

## 2018-08-15 DIAGNOSIS — I10 ESSENTIAL HYPERTENSION: ICD-10-CM

## 2018-08-15 DIAGNOSIS — H35.033 HYPERTENSIVE RETINOPATHY OF BOTH EYES: ICD-10-CM

## 2018-08-15 DIAGNOSIS — D64.9 ANEMIA, UNSPECIFIED TYPE: ICD-10-CM

## 2018-08-15 PROCEDURE — G0439 PPPS, SUBSEQ VISIT: HCPCS | Mod: S$GLB,,, | Performed by: NURSE PRACTITIONER

## 2018-08-15 PROCEDURE — 3079F DIAST BP 80-89 MM HG: CPT | Mod: CPTII,S$GLB,, | Performed by: NURSE PRACTITIONER

## 2018-08-15 PROCEDURE — 99999 PR PBB SHADOW E&M-EST. PATIENT-LVL III: CPT | Mod: PBBFAC,,, | Performed by: NURSE PRACTITIONER

## 2018-08-15 PROCEDURE — 3074F SYST BP LT 130 MM HG: CPT | Mod: CPTII,S$GLB,, | Performed by: NURSE PRACTITIONER

## 2018-08-15 NOTE — PROGRESS NOTES
"Geovani Evans presented for a  Medicare AWV and comprehensive Health Risk Assessment today. The following components were reviewed and updated:    · Medical history  · Family History  · Social history  · Allergies and Current Medications  · Health Risk Assessment  · Health Maintenance  · Care Team     ** See Completed Assessments for Annual Wellness Visit within the encounter summary.**       The following assessments were completed:  · Living Situation  · CAGE  · Depression Screening  · Timed Get Up and Go  · Whisper Test  · Cognitive Function Screening  · Nutrition Screening  · ADL Screening  · PAQ Screening            Vitals:    08/15/18 1049   BP: 128/82   BP Location: Left arm   Patient Position: Sitting   Pulse: 64   Weight: 103 kg (227 lb 1.2 oz)   Height: 5' 9" (1.753 m)     Body mass index is 33.53 kg/m².     Physical Exam   Constitutional: He is oriented to person, place, and time. He appears well-developed. No distress.   HENT:   Head: Normocephalic and atraumatic.   Cardiovascular: Normal rate, regular rhythm and normal heart sounds.   No murmur heard.  Pulmonary/Chest: Effort normal and breath sounds normal. No respiratory distress. He has no wheezes. He has no rales.   Musculoskeletal: He exhibits no edema, tenderness or deformity.   Neurological: He is alert and oriented to person, place, and time.   Skin: Skin is warm and dry. He is not diaphoretic.   Psychiatric: He has a normal mood and affect. His behavior is normal. He expresses no homicidal and no suicidal ideation. He expresses no suicidal plans and no homicidal plans.   Vitals reviewed.        Diagnoses and health risks identified today and associated recommendations/orders:    1. Encounter for preventive health examination  Health Maintenance reviewed.  Shingrix discussed.    2. Essential hypertension  Stable.   Continue current medications.  Followed by PCP.     3. Obesity, Class I, BMI 30-34.9  Stable.   Work on weight loss.  Exercise " regularly as tolerated.  Followed by PCP.     4. Hyperlipidemia, unspecified hyperlipidemia type  Stable.   Followed by PCP.     5. Hypertensive retinopathy of both eyes  Stable.   Followed by Ophthalmology.     6. Multinodular goiter (nontoxic)  Stable.   Followed by Endocrinology.     7. Male hypogonadism  Stable.   Followed by Endocrinology.     8. Anemia, unspecified type  Stable.   Followed by PCP.       Provided Olivo with a 5-10 year written screening schedule and personal prevention plan. Recommendations were developed using the USPSTF age appropriate recommendations. Education, counseling, and referrals were provided as needed. After Visit Summary printed and given to patient which includes a list of additional screenings\tests needed.    Follow-up for annual with PCP 10/2018. Return in 1 year for AWV.    Kecia Kenny NP

## 2018-08-15 NOTE — PATIENT INSTRUCTIONS
Counseling and Referral of Other Preventative  (Italic type indicates deductible and co-insurance are waived)    Patient Name: Geovani Evans  Today's Date: 8/15/2018    Health Maintenance       Date Due Completion Date    Influenza Vaccine 09/28/2018 (Originally 8/1/2018) 10/11/2017    Colonoscopy 07/18/2021 7/18/2011    Lipid Panel 10/12/2022 10/12/2017    TETANUS VACCINE 11/04/2023 11/4/2013        No orders of the defined types were placed in this encounter.    The following information is provided to all patients.  This information is to help you find resources for any of the problems found today that may be affecting your health:                Living healthy guide: www.[facility].[place].gov      Understanding Diabetes: www.diabetes.org      Eating healthy: www.cdc.gov/healthyweight      CDC home safety checklist: www.cdc.gov/steadi/patient.html      Agency on Aging: www.goea.[place].[facility]      Alcoholics anonymous (AA): www.aa.org      Physical Activity: www.sophie.nih.gov/uf6szmn      Tobacco use: www.quitwithusla.org

## 2018-10-11 ENCOUNTER — PATIENT OUTREACH (OUTPATIENT)
Dept: ADMINISTRATIVE | Facility: HOSPITAL | Age: 73
End: 2018-10-11

## 2018-10-11 DIAGNOSIS — I10 HYPERTENSION, UNSPECIFIED TYPE: Primary | ICD-10-CM

## 2018-10-11 NOTE — PROGRESS NOTES
Pre-visit audit complete, pt due for fasting labs.  Contacted pt and able to schedule lab visit on 10/12/18 at 1010.

## 2018-10-12 ENCOUNTER — LAB VISIT (OUTPATIENT)
Dept: LAB | Facility: HOSPITAL | Age: 73
End: 2018-10-12
Attending: INTERNAL MEDICINE
Payer: MEDICARE

## 2018-10-12 DIAGNOSIS — I10 HYPERTENSION, UNSPECIFIED TYPE: ICD-10-CM

## 2018-10-12 LAB
ALBUMIN SERPL BCP-MCNC: 4 G/DL
ALP SERPL-CCNC: 59 U/L
ALT SERPL W/O P-5'-P-CCNC: 19 U/L
ANION GAP SERPL CALC-SCNC: 10 MMOL/L
AST SERPL-CCNC: 19 U/L
BILIRUB SERPL-MCNC: 0.4 MG/DL
BUN SERPL-MCNC: 14 MG/DL
CALCIUM SERPL-MCNC: 9.4 MG/DL
CHLORIDE SERPL-SCNC: 104 MMOL/L
CHOLEST SERPL-MCNC: 215 MG/DL
CHOLEST/HDLC SERPL: 4.9 {RATIO}
CO2 SERPL-SCNC: 26 MMOL/L
CREAT SERPL-MCNC: 1.2 MG/DL
EST. GFR  (AFRICAN AMERICAN): >60 ML/MIN/1.73 M^2
EST. GFR  (NON AFRICAN AMERICAN): >60 ML/MIN/1.73 M^2
GLUCOSE SERPL-MCNC: 113 MG/DL
HDLC SERPL-MCNC: 44 MG/DL
HDLC SERPL: 20.5 %
LDLC SERPL CALC-MCNC: 143.6 MG/DL
NONHDLC SERPL-MCNC: 171 MG/DL
POTASSIUM SERPL-SCNC: 4.1 MMOL/L
PROT SERPL-MCNC: 7.7 G/DL
SODIUM SERPL-SCNC: 140 MMOL/L
TRIGL SERPL-MCNC: 137 MG/DL

## 2018-10-12 PROCEDURE — 80061 LIPID PANEL: CPT

## 2018-10-12 PROCEDURE — 80053 COMPREHEN METABOLIC PANEL: CPT

## 2018-10-12 PROCEDURE — 36415 COLL VENOUS BLD VENIPUNCTURE: CPT

## 2018-10-15 ENCOUNTER — OFFICE VISIT (OUTPATIENT)
Dept: INTERNAL MEDICINE | Facility: CLINIC | Age: 73
End: 2018-10-15
Attending: INTERNAL MEDICINE
Payer: MEDICARE

## 2018-10-15 VITALS
BODY MASS INDEX: 33.49 KG/M2 | WEIGHT: 226.13 LBS | HEIGHT: 69 IN | HEART RATE: 67 BPM | OXYGEN SATURATION: 96 % | DIASTOLIC BLOOD PRESSURE: 74 MMHG | SYSTOLIC BLOOD PRESSURE: 112 MMHG

## 2018-10-15 DIAGNOSIS — Z00.00 ANNUAL PHYSICAL EXAM: Primary | ICD-10-CM

## 2018-10-15 DIAGNOSIS — E66.9 OBESITY, CLASS I, BMI 30-34.9: ICD-10-CM

## 2018-10-15 DIAGNOSIS — E04.2 MULTINODULAR GOITER (NONTOXIC): ICD-10-CM

## 2018-10-15 DIAGNOSIS — E29.1 MALE HYPOGONADISM: ICD-10-CM

## 2018-10-15 DIAGNOSIS — D64.9 ANEMIA, UNSPECIFIED TYPE: ICD-10-CM

## 2018-10-15 DIAGNOSIS — E78.5 HYPERLIPIDEMIA, UNSPECIFIED HYPERLIPIDEMIA TYPE: ICD-10-CM

## 2018-10-15 DIAGNOSIS — K90.49 CARBOHYDRATE INTOLERANCE: ICD-10-CM

## 2018-10-15 DIAGNOSIS — I10 ESSENTIAL HYPERTENSION: ICD-10-CM

## 2018-10-15 PROBLEM — H26.9 CORTICAL CATARACT OF BOTH EYES: Status: RESOLVED | Noted: 2017-05-10 | Resolved: 2018-10-15

## 2018-10-15 PROCEDURE — 99397 PER PM REEVAL EST PAT 65+ YR: CPT | Mod: S$PBB,,, | Performed by: INTERNAL MEDICINE

## 2018-10-15 PROCEDURE — 99999 PR PBB SHADOW E&M-EST. PATIENT-LVL III: CPT | Mod: PBBFAC,,, | Performed by: INTERNAL MEDICINE

## 2018-10-15 PROCEDURE — 99213 OFFICE O/P EST LOW 20 MIN: CPT | Mod: PBBFAC | Performed by: INTERNAL MEDICINE

## 2018-10-15 PROCEDURE — 3074F SYST BP LT 130 MM HG: CPT | Mod: CPTII,,, | Performed by: INTERNAL MEDICINE

## 2018-10-15 PROCEDURE — 3078F DIAST BP <80 MM HG: CPT | Mod: CPTII,,, | Performed by: INTERNAL MEDICINE

## 2018-10-15 RX ORDER — PRAVASTATIN SODIUM 40 MG/1
40 TABLET ORAL DAILY
Qty: 90 TABLET | Refills: 3 | Status: SHIPPED | OUTPATIENT
Start: 2018-10-15 | End: 2019-11-12 | Stop reason: SDUPTHER

## 2018-10-15 RX ORDER — LISINOPRIL AND HYDROCHLOROTHIAZIDE 10; 12.5 MG/1; MG/1
1 TABLET ORAL DAILY
Qty: 90 TABLET | Refills: 3 | Status: SHIPPED | OUTPATIENT
Start: 2018-10-15 | End: 2019-11-27 | Stop reason: SDUPTHER

## 2018-10-15 NOTE — PROGRESS NOTES
Subjective:       Patient ID: Geovani Evans Jr. is a 72 y.o. male.    Chief Complaint: Annual Exam    HPI   Doing well.  Goes to [employer] 2-3 times a week - water exercises, weights.    Long standing hypertenson, well controlled.    Also with chronic hypogonadism, which he has declined to tx.  Applied  testosterone in past.  Continues to decline testosterone therapy.  May be cause of chronic anemia.  Np weakness, fatigue. Stamina is good.   ED periodic.  Able to have sex on occasion.    Review of Systems   Constitutional: Negative for activity change and unexpected weight change.   HENT: Negative for postnasal drip, rhinorrhea and sinus pressure.    Respiratory: Negative for chest tightness and shortness of breath.    Cardiovascular: Negative for chest pain and leg swelling.   Gastrointestinal: Negative for abdominal pain, nausea and vomiting.   Genitourinary: Negative for difficulty urinating, dysuria, hematuria and urgency.   Skin: Negative for rash.   Neurological: Negative for headaches.   Psychiatric/Behavioral: Negative for dysphoric mood and sleep disturbance. The patient is not nervous/anxious.        Objective:      Physical Exam   Constitutional: He is oriented to person, place, and time. He appears well-developed and well-nourished. No distress.        HENT:   Head: Normocephalic and atraumatic.   Eyes: Conjunctivae and EOM are normal. Pupils are equal, round, and reactive to light. Left eye exhibits no discharge. No scleral icterus.   Neck: Normal range of motion. Neck supple. No JVD present. No thyromegaly present.   Cardiovascular: Normal rate, regular rhythm and normal heart sounds.   No murmur heard.  Pulmonary/Chest: Effort normal and breath sounds normal. No respiratory distress.   Abdominal: Soft. He exhibits no distension and no mass. There is no tenderness.   Musculoskeletal: He exhibits no edema.   Lymphadenopathy:     He has no cervical adenopathy.   Neurological: He is alert and oriented to  person, place, and time.   Skin: Skin is dry. No rash noted.   Psychiatric: He has a normal mood and affect. His behavior is normal. Judgment and thought content normal.       Results for orders placed or performed in visit on 10/12/18   Lipid panel   Result Value Ref Range    Cholesterol 215 (H) 120 - 199 mg/dL    Triglycerides 137 30 - 150 mg/dL    HDL 44 40 - 75 mg/dL    LDL Cholesterol 143.6 63.0 - 159.0 mg/dL    HDL/Chol Ratio 20.5 20.0 - 50.0 %    Total Cholesterol/HDL Ratio 4.9 2.0 - 5.0    Non-HDL Cholesterol 171 mg/dL   Comprehensive metabolic panel   Result Value Ref Range    Sodium 140 136 - 145 mmol/L    Potassium 4.1 3.5 - 5.1 mmol/L    Chloride 104 95 - 110 mmol/L    CO2 26 23 - 29 mmol/L    Glucose 113 (H) 70 - 110 mg/dL    BUN, Bld 14 8 - 23 mg/dL    Creatinine 1.2 0.5 - 1.4 mg/dL    Calcium 9.4 8.7 - 10.5 mg/dL    Total Protein 7.7 6.0 - 8.4 g/dL    Albumin 4.0 3.5 - 5.2 g/dL    Total Bilirubin 0.4 0.1 - 1.0 mg/dL    Alkaline Phosphatase 59 55 - 135 U/L    AST 19 10 - 40 U/L    ALT 19 10 - 44 U/L    Anion Gap 10 8 - 16 mmol/L    eGFR if African American >60 >60 mL/min/1.73 m^2    eGFR if non African American >60 >60 mL/min/1.73 m^2   The 10-year ASCVD risk score (Fargo MEKHI Jr., et al., 2013) is: 15.9%    Values used to calculate the score:      Age: 72 years      Sex: Male      Is Non- : Yes      Diabetic: No      Tobacco smoker: No      Systolic Blood Pressure: 112 mmHg      Is BP treated: Yes      HDL Cholesterol: 44 mg/dL      Total Cholesterol: 215 mg/dL  Assessment:       1. Annual physical exam    2. Hyperlipidemia, unspecified hyperlipidemia type    3. Essential hypertension    4. Multinodular goiter (nontoxic)    5. Anemia, unspecified type    6. Male hypogonadism    7. Obesity, Class I, BMI 30-34.9    8. Carbohydrate intolerance        Plan:       Geovani was seen today for annual exam.    Diagnoses and all orders for this visit:    Annual physical  exam    Hyperlipidemia, unspecified hyperlipidemia type  -     AST (SGOT); Future  -     Lipid panel; Future  -     ALT (SGPT); Future    Essential hypertension    Multinodular goiter (nontoxic)    Anemia, unspecified type  -     CBC auto differential; Future    Male hypogonadism    Obesity, Class I, BMI 30-34.9    Other orders  -     lisinopril-hydrochlorothiazide (PRINZIDE,ZESTORETIC) 10-12.5 mg per tablet; Take 1 tablet by mouth once daily.  -     BEGIN pravastatin (PRAVACHOL) 40 MG tablet; Take 1 tablet (40 mg total) by mouth once daily.       He declines flu vax.       Labs 2 mo     F/u Endo for goiter.     Exercise regularly.  Weight loss diet reviewed.

## 2018-10-24 ENCOUNTER — LAB VISIT (OUTPATIENT)
Dept: LAB | Facility: HOSPITAL | Age: 73
End: 2018-10-24
Payer: MEDICARE

## 2018-10-24 DIAGNOSIS — E04.2 MULTINODULAR GOITER (NONTOXIC): ICD-10-CM

## 2018-10-24 LAB — TSH SERPL DL<=0.005 MIU/L-ACNC: 1.05 UIU/ML

## 2018-10-24 PROCEDURE — 36415 COLL VENOUS BLD VENIPUNCTURE: CPT

## 2018-10-24 PROCEDURE — 84443 ASSAY THYROID STIM HORMONE: CPT

## 2018-11-19 ENCOUNTER — OFFICE VISIT (OUTPATIENT)
Dept: INTERNAL MEDICINE | Facility: CLINIC | Age: 73
End: 2018-11-19
Payer: MEDICARE

## 2018-11-19 ENCOUNTER — TELEPHONE (OUTPATIENT)
Dept: INTERNAL MEDICINE | Facility: CLINIC | Age: 73
End: 2018-11-19

## 2018-11-19 VITALS
OXYGEN SATURATION: 92 % | HEIGHT: 69 IN | WEIGHT: 224 LBS | SYSTOLIC BLOOD PRESSURE: 130 MMHG | BODY MASS INDEX: 33.18 KG/M2 | DIASTOLIC BLOOD PRESSURE: 85 MMHG | HEART RATE: 84 BPM

## 2018-11-19 DIAGNOSIS — M10.9 ACUTE GOUT OF LEFT KNEE, UNSPECIFIED CAUSE: ICD-10-CM

## 2018-11-19 PROCEDURE — 99999 PR PBB SHADOW E&M-EST. PATIENT-LVL III: CPT | Mod: PBBFAC,GC,, | Performed by: STUDENT IN AN ORGANIZED HEALTH CARE EDUCATION/TRAINING PROGRAM

## 2018-11-19 PROCEDURE — 3075F SYST BP GE 130 - 139MM HG: CPT | Mod: CPTII,GC,S$GLB, | Performed by: STUDENT IN AN ORGANIZED HEALTH CARE EDUCATION/TRAINING PROGRAM

## 2018-11-19 PROCEDURE — 1101F PT FALLS ASSESS-DOCD LE1/YR: CPT | Mod: CPTII,GC,S$GLB, | Performed by: STUDENT IN AN ORGANIZED HEALTH CARE EDUCATION/TRAINING PROGRAM

## 2018-11-19 PROCEDURE — 96372 THER/PROPH/DIAG INJ SC/IM: CPT | Mod: S$GLB,,, | Performed by: INTERNAL MEDICINE

## 2018-11-19 PROCEDURE — 3079F DIAST BP 80-89 MM HG: CPT | Mod: CPTII,GC,S$GLB, | Performed by: STUDENT IN AN ORGANIZED HEALTH CARE EDUCATION/TRAINING PROGRAM

## 2018-11-19 PROCEDURE — 99213 OFFICE O/P EST LOW 20 MIN: CPT | Mod: 25,GC,S$GLB, | Performed by: STUDENT IN AN ORGANIZED HEALTH CARE EDUCATION/TRAINING PROGRAM

## 2018-11-19 RX ORDER — TRIAMCINOLONE ACETONIDE 40 MG/ML
40 INJECTION, SUSPENSION INTRA-ARTICULAR; INTRAMUSCULAR
Status: COMPLETED | OUTPATIENT
Start: 2018-11-19 | End: 2018-11-19

## 2018-11-19 RX ORDER — PREDNISONE 20 MG/1
20 TABLET ORAL DAILY
Qty: 5 TABLET | Refills: 0 | Status: SHIPPED | OUTPATIENT
Start: 2018-11-19 | End: 2018-11-24

## 2018-11-19 RX ADMIN — TRIAMCINOLONE ACETONIDE 40 MG: 40 INJECTION, SUSPENSION INTRA-ARTICULAR; INTRAMUSCULAR at 03:11

## 2018-11-19 NOTE — PATIENT INSTRUCTIONS
Gout    Gout is an inflammation of a joint due to a build-up of gout crystals in the joint fluid. This occurs when there is an excess of uric acid (a normal waste product) in the body. Uric acid builds up in the body when the kidneys are unable to filter enough of it from the blood. This may occur with age. It is also associated with kidney disease. Gout occurs more often in people with obesity, diabetes, high blood pressure, or high levels of fats in the blood. It may run in families. Gout tends to come and go. A flare up of gout is called an attack. Drinking alcohol or eating certain foods (such as shellfish or foods with additives such as high-fructose corn syrup) may increase uric acid levels in the blood and cause a gout attack.  During a gout attack, the affected joint may become a hot, red, swollen and painful. If you have had one attack of gout, you are likely to have another. An attack of gout can be treated with medicine. If these attacks become frequent, a daily medicine may be prescribed to help the kidneys remove uric acid from the body.  Home care  During a gout attack:  · Rest painful joints. If gout affects the joints of your foot or leg, you may want to use crutches for the first few days to keep from bearing weight on the affected joint.  · When sitting or lying down, raise the painful joint to a level higher than your heart.  · Apply an ice pack (ice cubes in a plastic bag wrapped in a thin towel) over the injured area for 20 minutes every 1 to 2 hours the first day for pain relief. Continue this 3 to 4 times a day for swelling and pain.  · Avoid alcohol and foods listed below (see Preventing attacks) during a gout attack. Drink extra fluid to help flush the uric acid through your kidneys.  · If you were prescribed a medicine to treat gout, take it as your healthcare provider has instructed. Don't skip doses.  · Take anti-inflammatory medicine as directed.   · If pain medicines have been  prescribed, take them exactly as directed.    Preventing attacks  · Minimize or avoid alcohol use. Excess alcohol intake can cause a gout attack.  · Limit these foods and beverages:  ¨ Organ meats, such as kidneys and liver  ¨ Certain seafoods (anchovies, sardines, shrimp, scallops, herring, mackerel)  ¨ Wild game, meat extracts and meat gravies  ¨ Foods and beverages sweetened with high-fructose corn syrup, such as sodas  · Eat a healthy diet including low-fat and nonfat dairy, whole grains, and vegetables.  · If you are overweight, talk to your healthcare provider about a weight reduction plan. Avoid fasting or extreme low calorie diets (less than 900 calories per day). This will increase uric acid levels in the body.  · If you have diabetes or high blood pressure, work with your doctor to manage these conditions.  · Protect the joint from injury. Trauma can trigger a gout attack.  Follow-up care  Follow up with your healthcare provider, or as advised.   When to seek medical advice  Call your healthcare provider if you have any of the following:  · Fever over 100.4°F (38.ºC) with worsening joint pain  · Increasing redness around the joint  · Pain developing in another joint  · Repeated vomiting, abdominal pain, or blood in the vomit or stool (black or red color)  Date Last Reviewed: 3/1/2017  © 4101-7494 [employer]. [address] [place] [address], [place] [address]. All rights reserved. This information is not intended as a substitute for professional medical care. Always follow your healthcare professional's instructions.        Treating Gout Attacks     Raising the joint above the level of your heart can help reduce gout symptoms.     Gout is a disease that affects the joints. It is caused by excess uric acid in your blood stream that may lead to crystals forming in your joints. Left untreated, it can lead to painful foot and joint deformities and even kidney problems. But, by treating gout early, you can  relieve pain and help prevent future problems. Gout can usually be treated with medication and proper diet. In severe cases, surgery may be needed.  Gout attacks are painful and often happen more than once. Taking medications may reduce pain and prevent attacks in the future. There are also some things you can do at home to relieve symptoms.  Medications for gout  Your healthcare provider may prescribe a daily medication to reduce levels of uric acid. Reducing your uric acid levels may help prevent gout attacks. Allopurinol is one commonly used medication taken daily to reduce uric acid levels. Other medications can help relieve pain and swelling during an acute attack. Medicines such as NSAIDs (nonsteroidal anti-inflammatory medicines), steroids, and colchicine may be prescribed for intermittent use to relieve an acute gout attack. Be sure to take your medication as directed.  What you can do  Below are some things you can do at home to relieve gout symptoms. Your healthcare provider may have other tips.  · Rest the painful joint as much as you can.  · Raise the painful joint so it is at a level higher than your heart.  · Use ice for 10 minutes every 1-2 hours as possible.  How can I prevent gout?  With a little effort, you may be able to prevent gout attacks in the future. Here are some things you can do:  · Avoid foods high in purines  ¨ Certain meats (red meat, processed meat, turkey)  ¨ Organ meats (kidney, liver, sweetbread)  ¨ Shellfish (lobster, crab, shrimp, scallop, mussel)  ¨ Certain fish (anchovy, sardine, herring, mackerel)  · Take any medications prescribed by your healthcare provider.  · Lose weight if you need to.  · Reduce high fructose corn syrup in meals and drinks.  · Reduce or eliminate consumption of alcohol, particularly beer, but also red wine and spirits.  · Control blood pressure, diabetes, and cholesterol.  · Drink plenty of water to help flush uric acid from your body.  Date Last  Reviewed: 2/1/2016  © 0930-7333 [employer]. [address] [place] [address], [place] [address]. All rights reserved. This information is not intended as a substitute for professional medical care. Always follow your healthcare professional's instructions.

## 2018-11-19 NOTE — PROGRESS NOTES
Subjective:      Patient ID: Geovani Evans Jr. is a 73 y.o. male.    Chief Complaint: Knee Pain    73 years old male patient with medical history of HTN. HLP and chronic hypogonadism presented for urgent care visit with knee pain.  Left knee pain and swelling started gradually last friday and getting progressively worse, aggravated by bending his knee, 10/10 in severity, no other joint involvement, denied previous similar episode, fever,chills, injury/trauma to the knee, flue like symptoms,GI/Urinary symptoms.  Per chart review patient had gout attack in 7/2017 treated with steroid.          Review of Systems   Constitutional: Negative for activity change, fever and unexpected weight change.   HENT: Negative for postnasal drip, rhinorrhea and sinus pressure.    Respiratory: Negative for chest tightness and shortness of breath.    Cardiovascular: Negative for chest pain and leg swelling.   Gastrointestinal: Negative for abdominal pain, nausea and vomiting.   Genitourinary: Negative for difficulty urinating, dysuria, hematuria and urgency.   Musculoskeletal: Positive for arthralgias, gait problem and joint swelling.   Skin: Negative for rash.   Neurological: Negative for headaches.   Psychiatric/Behavioral: Negative for dysphoric mood and sleep disturbance. The patient is not nervous/anxious.      Objective:     Vitals:    11/19/18 1452   BP: 130/85   Pulse: 84     Physical Exam   Constitutional: He is oriented to person, place, and time. He appears well-developed and well-nourished. No distress.        HENT:   Head: Normocephalic and atraumatic.   Eyes: Conjunctivae and EOM are normal. Pupils are equal, round, and reactive to light. Left eye exhibits no discharge. No scleral icterus.   Neck: Normal range of motion. Neck supple. No JVD present. No thyromegaly present.   Cardiovascular: Normal rate, regular rhythm and normal heart sounds.   No murmur heard.  Pulmonary/Chest: Effort normal and breath sounds normal. No  respiratory distress.   Abdominal: Soft. He exhibits no distension and no mass. There is no tenderness.   Musculoskeletal: He exhibits no edema.        Right knee: Normal. He exhibits normal range of motion, no swelling and no erythema.        Left knee: He exhibits decreased range of motion, swelling and erythema. He exhibits no laceration.   Lymphadenopathy:     He has no cervical adenopathy.   Neurological: He is alert and oriented to person, place, and time.   Skin: Skin is dry. No rash noted.   Psychiatric: He has a normal mood and affect. His behavior is normal. Judgment and thought content normal.   Vitals reviewed.    Assessment and Plan :      1. Acute gout of left knee, unspecified cause        Geovani was seen today for knee pain.    Diagnoses and all orders for this visit:    Acute gout of left knee, unspecified cause  -     triamcinolone acetonide injection 40 mg  -     predniSONE (DELTASONE) 20 MG tablet; Take 1 tablet (20 mg total) by mouth once daily. for 5 days               Mario Hendrix  Internal Medicine, PGY 2  [phone]

## 2018-11-21 NOTE — PROGRESS NOTES
"I have personally taken the history and examined this patient and agree with the resident's note as stated above with the following thoughts:  /85 (BP Location: Right arm, Patient Position: Sitting, BP Method: Large (Manual))   Pulse 84   Ht 5' 9" (1.753 m)   Wt 101.6 kg (224 lb)   SpO2 (!) 92%   BMI 33.08 kg/m²      Gout-- discussed causes and treatment     Discussed getting vaccines up to date.  Discussed importance of low fat diet and exercise     "

## 2019-05-08 ENCOUNTER — OFFICE VISIT (OUTPATIENT)
Dept: OPHTHALMOLOGY | Facility: CLINIC | Age: 74
End: 2019-05-08
Payer: MEDICARE

## 2019-05-08 DIAGNOSIS — H52.7 REFRACTIVE ERROR: ICD-10-CM

## 2019-05-08 DIAGNOSIS — Z96.1 PSEUDOPHAKIA: ICD-10-CM

## 2019-05-08 DIAGNOSIS — H35.342 MACULAR HOLE, LEFT EYE: ICD-10-CM

## 2019-05-08 DIAGNOSIS — H25.11 NUCLEAR SCLEROSIS, RIGHT: Primary | ICD-10-CM

## 2019-05-08 DIAGNOSIS — H35.033 HYPERTENSIVE RETINOPATHY OF BOTH EYES: ICD-10-CM

## 2019-05-08 PROCEDURE — 99999 PR PBB SHADOW E&M-EST. PATIENT-LVL III: CPT | Mod: PBBFAC,HCNC,, | Performed by: OPHTHALMOLOGY

## 2019-05-08 PROCEDURE — 99999 PR PBB SHADOW E&M-EST. PATIENT-LVL III: ICD-10-PCS | Mod: PBBFAC,HCNC,, | Performed by: OPHTHALMOLOGY

## 2019-05-08 PROCEDURE — 92014 PR EYE EXAM, EST PATIENT,COMPREHESV: ICD-10-PCS | Mod: HCNC,S$GLB,, | Performed by: OPHTHALMOLOGY

## 2019-05-08 PROCEDURE — 92014 COMPRE OPH EXAM EST PT 1/>: CPT | Mod: HCNC,S$GLB,, | Performed by: OPHTHALMOLOGY

## 2019-05-08 NOTE — PROGRESS NOTES
Subjective:       Patient ID: Geovani Evans Jr. is a 73 y.o. male.    Chief Complaint: Eye Exam    HPI     DSL- 7/26/17     72 y/o male is here routine eye exam. Pt states near [place] is clearer without   glasses on. Occas teary eyes OS. Denies floaters and flashes.     Eyemeds  No gtts    Last edited by Courtney Prakash on 5/8/2019 11:32 AM. (History)             Assessment:       1. Nuclear sclerosis, right    2. Macular hole, left eye    3. Hypertensive retinopathy of both eyes    4. Refractive error    5. Pseudophakia        Plan:       Cataracts- Not visually significant.  [facility] OS s/p repair 12/7/16-Doing well.  HTN retinopathy OU-Stable.  RE-Pt does not want MRx.      Control HTN.  RTC 1 yr.

## 2019-05-14 ENCOUNTER — OFFICE VISIT (OUTPATIENT)
Dept: INTERNAL MEDICINE | Facility: CLINIC | Age: 74
End: 2019-05-14
Payer: MEDICARE

## 2019-05-14 VITALS
BODY MASS INDEX: 33.5 KG/M2 | HEIGHT: 69 IN | WEIGHT: 226.19 LBS | SYSTOLIC BLOOD PRESSURE: 106 MMHG | TEMPERATURE: 98 F | DIASTOLIC BLOOD PRESSURE: 70 MMHG | OXYGEN SATURATION: 95 % | HEART RATE: 62 BPM

## 2019-05-14 DIAGNOSIS — Z00.00 ENCOUNTER FOR PREVENTIVE HEALTH EXAMINATION: Primary | ICD-10-CM

## 2019-05-14 DIAGNOSIS — E29.1 MALE HYPOGONADISM: ICD-10-CM

## 2019-05-14 DIAGNOSIS — E78.2 MIXED HYPERLIPIDEMIA: ICD-10-CM

## 2019-05-14 DIAGNOSIS — E66.9 OBESITY (BMI 30-39.9): ICD-10-CM

## 2019-05-14 DIAGNOSIS — I10 ESSENTIAL HYPERTENSION: ICD-10-CM

## 2019-05-14 DIAGNOSIS — H35.033 HYPERTENSIVE RETINOPATHY OF BOTH EYES: ICD-10-CM

## 2019-05-14 DIAGNOSIS — E04.2 MULTINODULAR GOITER (NONTOXIC): ICD-10-CM

## 2019-05-14 PROCEDURE — 3074F PR MOST RECENT SYSTOLIC BLOOD PRESSURE < 130 MM HG: ICD-10-PCS | Mod: HCNC,CPTII,S$GLB, | Performed by: NURSE PRACTITIONER

## 2019-05-14 PROCEDURE — G0439 PR MEDICARE ANNUAL WELLNESS SUBSEQUENT VISIT: ICD-10-PCS | Mod: HCNC,S$GLB,, | Performed by: NURSE PRACTITIONER

## 2019-05-14 PROCEDURE — 3078F PR MOST RECENT DIASTOLIC BLOOD PRESSURE < 80 MM HG: ICD-10-PCS | Mod: HCNC,CPTII,S$GLB, | Performed by: NURSE PRACTITIONER

## 2019-05-14 PROCEDURE — 99999 PR PBB SHADOW E&M-EST. PATIENT-LVL IV: ICD-10-PCS | Mod: PBBFAC,HCNC,, | Performed by: NURSE PRACTITIONER

## 2019-05-14 PROCEDURE — G0439 PPPS, SUBSEQ VISIT: HCPCS | Mod: HCNC,S$GLB,, | Performed by: NURSE PRACTITIONER

## 2019-05-14 PROCEDURE — 3074F SYST BP LT 130 MM HG: CPT | Mod: HCNC,CPTII,S$GLB, | Performed by: NURSE PRACTITIONER

## 2019-05-14 PROCEDURE — 99999 PR PBB SHADOW E&M-EST. PATIENT-LVL IV: CPT | Mod: PBBFAC,HCNC,, | Performed by: NURSE PRACTITIONER

## 2019-05-14 PROCEDURE — 3078F DIAST BP <80 MM HG: CPT | Mod: HCNC,CPTII,S$GLB, | Performed by: NURSE PRACTITIONER

## 2019-05-14 NOTE — PROGRESS NOTES
"Geovani Evans presented for a  Medicare AWV and comprehensive Health Risk Assessment today. The following components were reviewed and updated:    · Medical history  · Family History  · Social history  · Allergies and Current Medications  · Health Risk Assessment  · Health Maintenance  · Care Team     ** See Completed Assessments for Annual Wellness Visit within the encounter summary.**       The following assessments were completed:  · Living Situation  · CAGE  · Depression Screening  · Timed Get Up and Go  · Whisper Test  · Cognitive Function Screening        · Nutrition Screening  · ADL Screening  · PAQ Screening    Vitals:    05/14/19 0936   BP: 106/70   BP Location: Left arm   Patient Position: Sitting   BP Method: Large (Manual)   Pulse: 62   Temp: 97.6 °F (36.4 °C)   SpO2: 95%   Weight: 102.6 kg (226 lb 3.1 oz)   Height: 5' 9" (1.753 m)     Body mass index is 33.4 kg/m².  Physical Exam   Constitutional: He is oriented to person, place, and time. He appears well-developed and well-nourished.   obese   HENT:   Head: Normocephalic.   Eyes: Pupils are equal, round, and reactive to light. Conjunctivae, EOM and lids are normal. Lids are everted and swept, no foreign bodies found.   Neck: Trachea normal, normal range of motion and full passive range of motion without pain. Neck supple. No JVD present. Carotid bruit is not present.   Cardiovascular: Normal rate, regular rhythm, normal heart sounds, intact distal pulses and normal pulses.   Pulmonary/Chest: Effort normal and breath sounds normal.   Abdominal: Soft. Normal appearance and bowel sounds are normal. There is no hepatosplenomegaly. There is no CVA tenderness.   obese   Musculoskeletal: Normal range of motion.   Neurological: He is alert and oriented to person, place, and time.   Skin: Skin is warm, dry and intact. Capillary refill takes less than 2 seconds.   Psychiatric: He has a normal mood and affect. His speech is normal and behavior is normal. " Judgment and thought content normal. Cognition and memory are normal.   Nursing note and vitals reviewed.        Diagnoses and health risks identified today and associated recommendations/orders:    1. Encounter for preventive health examination  Exam done    Health Maintenance updated    Records reviewed    2. Multinodular goiter (nontoxic)  Chronic, followed by Endocrine    3. Essential hypertension  Stable, followed by PCP    Take medications as prescribed.    Monitor BP at home, goal BP < or = 140/80, call office if consistently above this range.    Follow low salt DASH diet and exercise.    BMI reviewed.    Go to ED if Headaches, blurred vision, chest pain, or SOB occurs along with elevated readings > or = 160/90.    4. Mixed hyperlipidemia  Stable, followed by PCP    Continue cholesterol med, low fat diet, and exercise.     5. Hypertensive retinopathy of both eyes  Chronic, followed by Opthalmology    6. Male hypogonadism  Chronic, followed by Endocrine    7. BMI 33.0-33.9,adult  BMI reviewed    8. Obesity (BMI 30-39.9)  BMI reviewed.    Diet and exercise to lose weight.      Provided Olivo with a 5-10 year written screening schedule and personal prevention plan. Recommendations were developed using the USPSTF age appropriate recommendations. Education, counseling, and referrals were provided as needed. After Visit Summary printed and given to patient which includes a list of additional screenings\tests needed.    Follow up in about 5 months (around 10/14/2019), or with PCP Dr. Fonseca for annual.    Madelyn Epps DNP  I offered to discuss end of life issues, including information on how to make advance directives that the patient could use to name someone who would make medical decisions on their behalf if they became too ill to make themselves.    ___Patient declined  _X_Patient is interested, I provided paper work and offered to discuss.

## 2019-05-14 NOTE — PATIENT INSTRUCTIONS
Counseling and Referral of Other Preventative  (Italic type indicates deductible and co-insurance are waived)    Patient Name: Goevani Evans  Today's Date: 5/14/2019    Health Maintenance       Date Due Completion Date    Shingles Vaccine (1 of 2) 10/24/1997 ---on backorder    Influenza Vaccine 08/01/2019 10/11/2017    Colonoscopy 07/18/2021 7/18/2011    Lipid Panel 10/12/2023 10/12/2018    TETANUS VACCINE 11/04/2023 11/4/2013        No orders of the defined types were placed in this encounter.    The following information is provided to all patients.  This information is to help you find resources for any of the problems found today that may be affecting your health:                Living healthy guide: www.[facility].[place].gov      Understanding Diabetes: www.diabetes.org      Eating healthy: www.cdc.gov/healthyweight      [facility] home safety checklist: www.cdc.gov/steadi/patient.html      Agency on Aging: www.goea.[place].[facility]      Alcoholics anonymous (AA): www.aa.org      Physical Activity: www.sophie.nih.gov/gd4yusm      Tobacco use: www.quitwithusla.org

## 2019-11-12 DIAGNOSIS — I10 ESSENTIAL HYPERTENSION: ICD-10-CM

## 2019-11-12 DIAGNOSIS — E78.2 MIXED HYPERLIPIDEMIA: Primary | ICD-10-CM

## 2019-11-12 RX ORDER — PRAVASTATIN SODIUM 40 MG/1
TABLET ORAL
Qty: 90 TABLET | Refills: 0 | Status: SHIPPED | OUTPATIENT
Start: 2019-11-12 | End: 2020-06-16 | Stop reason: SDUPTHER

## 2019-11-29 RX ORDER — LISINOPRIL AND HYDROCHLOROTHIAZIDE 10; 12.5 MG/1; MG/1
1 TABLET ORAL DAILY
Qty: 90 TABLET | Refills: 0 | Status: SHIPPED | OUTPATIENT
Start: 2019-11-29 | End: 2020-05-06

## 2019-11-29 NOTE — PROGRESS NOTES
Refill Authorization Note     is requesting a refill authorization.    Brief assessment and rationale for refill: APPROVE: prr  Name and strength of medication: lisinopril-hydrochlorothiazide (PRINZIDE,ZESTORETIC) 10-12.5 mg per tablet       Medication Therapy Plan: FLOS                              Comments:   Requested Prescriptions   Pending Prescriptions Disp Refills    lisinopril-hydrochlorothiazide (PRINZIDE,ZESTORETIC) 10-12.5 mg per tablet [Pharmacy Med Name: LISINOPRIL/HCTZ 10-12.5MG TAB] 90 tablet 0     Sig: TAKE 1 TABLET BY MOUTH ONCE DAILY       Cardiovascular:  ACEI + Diuretic Combos Failed - 11/27/2019  1:34 PM        Failed - Na in normal range and within 180 days     Sodium   Date Value Ref Range Status   10/12/2018 140 136 - 145 mmol/L Final   10/12/2017 143 136 - 145 mmol/L Final   08/11/2016 140 136 - 145 mmol/L Final              Failed - K in normal range and within 180 days     Potassium   Date Value Ref Range Status   10/12/2018 4.1 3.5 - 5.1 mmol/L Final   10/12/2017 4.1 3.5 - 5.1 mmol/L Final   08/11/2016 4.5 3.5 - 5.1 mmol/L Final              Failed - Cr is 1.4 or below and within 180 days     Creatinine   Date Value Ref Range Status   10/12/2018 1.2 0.5 - 1.4 mg/dL Final   10/12/2017 1.1 0.5 - 1.4 mg/dL Final   08/11/2016 1.2 0.5 - 1.4 mg/dL Final              Failed - Ca in normal range and within 360 days     Calcium   Date Value Ref Range Status   10/12/2018 9.4 8.7 - 10.5 mg/dL Final   10/12/2017 9.4 8.7 - 10.5 mg/dL Final   08/11/2016 10.0 8.7 - 10.5 mg/dL Final              Failed - eGFR within 360 days     eGFR if non    Date Value Ref Range Status   10/12/2018 >60 >60 mL/min/1.73 m^2 Final     Comment:     Calculation used to obtain the estimated glomerular filtration  rate (eGFR) is the CKD-EPI equation.      10/12/2017 >60 >60 mL/min/1.73 m^2 Final     Comment:     Calculation used to obtain the estimated glomerular filtration  rate (eGFR) is the  CKD-EPI equation. Since race is unknown   in our information system, the eGFR values for   -American and Non--American patients are given   for each creatinine result.     08/11/2016 >60.0 >60 mL/min/1.73 m^2 Final     Comment:     Calculation used to obtain the estimated glomerular filtration  rate (eGFR) is the CKD-EPI equation. Since race is unknown   in our information system, the eGFR values for   -American and Non--American patients are given   for each creatinine result.                Passed - Patient is at least 18 years old        Passed - Last BP in normal range within 360 days     BP Readings from Last 3 Encounters:   05/14/19 106/70   11/19/18 130/85   10/15/18 112/74              Passed - Office visit in past 12 months or future 90 days     Recent Outpatient Visits            6 months ago Encounter for preventive health examination    [facility] Madelyn Epps DNP    6 months ago Nuclear sclerosis, right    [place] - Ophthalmology Patricio Cruz MD    1 year ago Acute gout of left knee, unspecified cause    [facility] Medicine KENTRELL Milner    1 year ago Annual physical exam    [facility] Internal Medicine Julia Fonseca MD    1 year ago Encounter for preventive health examination    [facility] Internal Medicine Kecia Kenny NP          Future Appointments              In 3 months LAB, APPOINTMENT [facility]    In 3 months MD [facility] Joseph Zavala

## 2020-03-02 ENCOUNTER — PES CALL (OUTPATIENT)
Dept: ADMINISTRATIVE | Facility: CLINIC | Age: 75
End: 2020-03-02

## 2020-03-09 ENCOUNTER — PATIENT OUTREACH (OUTPATIENT)
Dept: ADMINISTRATIVE | Facility: HOSPITAL | Age: 75
End: 2020-03-09

## 2020-03-18 ENCOUNTER — LAB VISIT (OUTPATIENT)
Dept: LAB | Facility: HOSPITAL | Age: 75
End: 2020-03-18
Attending: INTERNAL MEDICINE
Payer: MEDICARE

## 2020-03-18 DIAGNOSIS — E78.2 MIXED HYPERLIPIDEMIA: ICD-10-CM

## 2020-03-18 DIAGNOSIS — I10 ESSENTIAL HYPERTENSION: ICD-10-CM

## 2020-03-18 LAB
ALBUMIN SERPL BCP-MCNC: 3.8 G/DL (ref 3.5–5.2)
ALP SERPL-CCNC: 57 U/L (ref 55–135)
ALT SERPL W/O P-5'-P-CCNC: 15 U/L (ref 10–44)
ANION GAP SERPL CALC-SCNC: 7 MMOL/L (ref 8–16)
AST SERPL-CCNC: 16 U/L (ref 10–40)
BASOPHILS # BLD AUTO: 0.05 K/UL (ref 0–0.2)
BASOPHILS NFR BLD: 0.8 % (ref 0–1.9)
BILIRUB SERPL-MCNC: 0.4 MG/DL (ref 0.1–1)
BUN SERPL-MCNC: 19 MG/DL (ref 8–23)
CALCIUM SERPL-MCNC: 9.1 MG/DL (ref 8.7–10.5)
CHLORIDE SERPL-SCNC: 107 MMOL/L (ref 95–110)
CHOLEST SERPL-MCNC: 161 MG/DL (ref 120–199)
CHOLEST/HDLC SERPL: 3.9 {RATIO} (ref 2–5)
CO2 SERPL-SCNC: 28 MMOL/L (ref 23–29)
CREAT SERPL-MCNC: 1.2 MG/DL (ref 0.5–1.4)
DIFFERENTIAL METHOD: ABNORMAL
EOSINOPHIL # BLD AUTO: 0.2 K/UL (ref 0–0.5)
EOSINOPHIL NFR BLD: 2.7 % (ref 0–8)
ERYTHROCYTE [DISTWIDTH] IN BLOOD BY AUTOMATED COUNT: 12.9 % (ref 11.5–14.5)
EST. GFR  (AFRICAN AMERICAN): >60 ML/MIN/1.73 M^2
EST. GFR  (NON AFRICAN AMERICAN): >60 ML/MIN/1.73 M^2
GLUCOSE SERPL-MCNC: 96 MG/DL (ref 70–110)
HCT VFR BLD AUTO: 37.2 % (ref 40–54)
HDLC SERPL-MCNC: 41 MG/DL (ref 40–75)
HDLC SERPL: 25.5 % (ref 20–50)
HGB BLD-MCNC: 11.2 G/DL (ref 14–18)
IMM GRANULOCYTES # BLD AUTO: 0.01 K/UL (ref 0–0.04)
IMM GRANULOCYTES NFR BLD AUTO: 0.2 % (ref 0–0.5)
LDLC SERPL CALC-MCNC: 91.2 MG/DL (ref 63–159)
LYMPHOCYTES # BLD AUTO: 2.9 K/UL (ref 1–4.8)
LYMPHOCYTES NFR BLD: 43.4 % (ref 18–48)
MCH RBC QN AUTO: 27.8 PG (ref 27–31)
MCHC RBC AUTO-ENTMCNC: 30.1 G/DL (ref 32–36)
MCV RBC AUTO: 92 FL (ref 82–98)
MONOCYTES # BLD AUTO: 0.5 K/UL (ref 0.3–1)
MONOCYTES NFR BLD: 7.9 % (ref 4–15)
NEUTROPHILS # BLD AUTO: 3 K/UL (ref 1.8–7.7)
NEUTROPHILS NFR BLD: 45 % (ref 38–73)
NONHDLC SERPL-MCNC: 120 MG/DL
NRBC BLD-RTO: 0 /100 WBC
PLATELET # BLD AUTO: 197 K/UL (ref 150–350)
PMV BLD AUTO: 10.9 FL (ref 9.2–12.9)
POTASSIUM SERPL-SCNC: 4.5 MMOL/L (ref 3.5–5.1)
PROT SERPL-MCNC: 7.4 G/DL (ref 6–8.4)
RBC # BLD AUTO: 4.03 M/UL (ref 4.6–6.2)
SODIUM SERPL-SCNC: 142 MMOL/L (ref 136–145)
TRIGL SERPL-MCNC: 144 MG/DL (ref 30–150)
WBC # BLD AUTO: 6.62 K/UL (ref 3.9–12.7)

## 2020-03-18 PROCEDURE — 80061 LIPID PANEL: CPT | Mod: HCNC

## 2020-03-18 PROCEDURE — 80053 COMPREHEN METABOLIC PANEL: CPT | Mod: HCNC

## 2020-03-18 PROCEDURE — 85025 COMPLETE CBC W/AUTO DIFF WBC: CPT | Mod: HCNC

## 2020-03-18 PROCEDURE — 36415 COLL VENOUS BLD VENIPUNCTURE: CPT | Mod: HCNC

## 2020-05-06 RX ORDER — LISINOPRIL AND HYDROCHLOROTHIAZIDE 10; 12.5 MG/1; MG/1
1 TABLET ORAL DAILY
Qty: 90 TABLET | Refills: 0 | Status: SHIPPED | OUTPATIENT
Start: 2020-05-06 | End: 2020-06-16 | Stop reason: SDUPTHER

## 2020-05-06 NOTE — TELEPHONE ENCOUNTER
Please schedule patient for Annual    Please also check with your provider if any further labs need to be added and scheduled together.    Thanks !

## 2020-05-06 NOTE — PROGRESS NOTES
Refill Authorization Note    is requesting a refill authorization.    Brief assessment and rationale for refill: APPROVE: need appt      Medication-related problems identified: Requires appointment    Medication Therapy Plan: pt need appt(ANNUAL); approve 3 more     Medication reconciliation completed: No                         Comments:      Requested Prescriptions   Pending Prescriptions Disp Refills    lisinopriL-hydrochlorothiazide (PRINZIDE,ZESTORETIC) 10-12.5 mg per tablet [Pharmacy Med Name: Lisinopril-hydroCHLOROthiazide 10-12.5 MG Oral Tablet] 90 tablet 0     Sig: Take 1 tablet by mouth once daily       Cardiovascular:  ACEI + Diuretic Combos Passed - 5/6/2020  8:02 AM        Passed - Patient is at least 18 years old        Passed - Last BP in normal range within 360 days.     BP Readings from Last 3 Encounters:   05/14/19 106/70   11/19/18 130/85   10/15/18 112/74              Passed - Office visit in past 12 months or future 90 days.     Recent Outpatient Visits            11 months ago Encounter for preventive health examination    [facility] - Internal Medicine Madelyn Epps DNP    12 months ago Nuclear sclerosis, right    [place] - Ophthalmology Patricio Cruz MD    1 year ago Acute gout of left knee, unspecified cause    [facility] - Internal Medicine KENTRELL Milner    1 year ago Annual physical exam    [facility] - Internal Medicine Julia Fonseca MD    1 year ago Encounter for preventive health examination    [facility] - Internal Medicine Kecia Kenny NP          Future Appointments              In 1 week [facility] - Podiatry, [facility]    In 3 weeks ANNUAL WELLNESS VISIT-NURSE PRACTITIONER, [facility] - Internal Medicine, [facility]                Passed - Na is between 130 and 148 and within 180 days     Sodium   Date Value Ref Range Status   03/18/2020 142 136 - 145 mmol/L Final   10/12/2018 140 136 - 145 mmol/L Final   10/12/2017 143 136 - 145  mmol/L Final              Passed - K in normal range and within 180 days     Potassium   Date Value Ref Range Status   03/18/2020 4.5 3.5 - 5.1 mmol/L Final   10/12/2018 4.1 3.5 - 5.1 mmol/L Final   10/12/2017 4.1 3.5 - 5.1 mmol/L Final              Passed - Cr is 1.3 or below and within 180 days     Creatinine   Date Value Ref Range Status   03/18/2020 1.2 0.5 - 1.4 mg/dL Final   10/12/2018 1.2 0.5 - 1.4 mg/dL Final   10/12/2017 1.1 0.5 - 1.4 mg/dL Final              Passed - Ca in normal range and within 360 days     Calcium   Date Value Ref Range Status   03/18/2020 9.1 8.7 - 10.5 mg/dL Final   10/12/2018 9.4 8.7 - 10.5 mg/dL Final   10/12/2017 9.4 8.7 - 10.5 mg/dL Final              Passed - eGFR within 360 days     eGFR if non    Date Value Ref Range Status   03/18/2020 >60.0 >60 mL/min/1.73 m^2 Final     Comment:     Calculation used to obtain the estimated glomerular filtration  rate (eGFR) is the CKD-EPI equation.      10/12/2018 >60 >60 mL/min/1.73 m^2 Final     Comment:     Calculation used to obtain the estimated glomerular filtration  rate (eGFR) is the CKD-EPI equation.      10/12/2017 >60 >60 mL/min/1.73 m^2 Final     Comment:     Calculation used to obtain the estimated glomerular filtration  rate (eGFR) is the CKD-EPI equation. Since race is unknown   in our information system, the eGFR values for   -American and Non--American patients are given   for each creatinine result.       eGFR if    Date Value Ref Range Status   03/18/2020 >60.0 >60 mL/min/1.73 m^2 Final   10/12/2018 >60 >60 mL/min/1.73 m^2 Final   10/12/2017 >60 >60 mL/min/1.73 m^2 Final               Appointments  past 12m or future 3m with PCP    Date Provider   Last Visit   10/15/2018 Julia Fonseca MD   Next Visit   Visit date not found Julia Fonseca MD   ED visits in past 90 days: 0     Note composed:8:03 AM 05/06/2020

## 2020-05-12 ENCOUNTER — PATIENT MESSAGE (OUTPATIENT)
Dept: PODIATRY | Facility: CLINIC | Age: 75
End: 2020-05-12

## 2020-06-16 ENCOUNTER — LAB VISIT (OUTPATIENT)
Dept: LAB | Facility: HOSPITAL | Age: 75
End: 2020-06-16
Attending: INTERNAL MEDICINE
Payer: MEDICARE

## 2020-06-16 ENCOUNTER — OFFICE VISIT (OUTPATIENT)
Dept: INTERNAL MEDICINE | Facility: CLINIC | Age: 75
End: 2020-06-16
Payer: MEDICARE

## 2020-06-16 VITALS
DIASTOLIC BLOOD PRESSURE: 82 MMHG | BODY MASS INDEX: 34.41 KG/M2 | OXYGEN SATURATION: 96 % | HEART RATE: 86 BPM | SYSTOLIC BLOOD PRESSURE: 128 MMHG | HEIGHT: 68 IN | WEIGHT: 227.06 LBS

## 2020-06-16 DIAGNOSIS — E29.1 MALE HYPOGONADISM: ICD-10-CM

## 2020-06-16 DIAGNOSIS — Z00.00 ANNUAL PHYSICAL EXAM: Primary | ICD-10-CM

## 2020-06-16 DIAGNOSIS — E04.2 MULTINODULAR GOITER (NONTOXIC): ICD-10-CM

## 2020-06-16 DIAGNOSIS — D64.9 ANEMIA, UNSPECIFIED TYPE: ICD-10-CM

## 2020-06-16 DIAGNOSIS — I10 ESSENTIAL HYPERTENSION: ICD-10-CM

## 2020-06-16 DIAGNOSIS — H35.349 MACULAR HOLE, UNSPECIFIED LATERALITY: ICD-10-CM

## 2020-06-16 DIAGNOSIS — Z12.5 ENCOUNTER FOR SCREENING FOR MALIGNANT NEOPLASM OF PROSTATE: ICD-10-CM

## 2020-06-16 DIAGNOSIS — E78.2 MIXED HYPERLIPIDEMIA: ICD-10-CM

## 2020-06-16 DIAGNOSIS — D64.9 CHRONIC ANEMIA: ICD-10-CM

## 2020-06-16 DIAGNOSIS — M10.9 GOUT, UNSPECIFIED CAUSE, UNSPECIFIED CHRONICITY, UNSPECIFIED SITE: ICD-10-CM

## 2020-06-16 DIAGNOSIS — R32 URINARY INCONTINENCE, UNSPECIFIED TYPE: ICD-10-CM

## 2020-06-16 LAB
BACTERIA #/AREA URNS AUTO: ABNORMAL /HPF
BILIRUB UR QL STRIP: NEGATIVE
CLARITY UR REFRACT.AUTO: CLEAR
COLOR UR AUTO: YELLOW
COMPLEXED PSA SERPL-MCNC: 1.7 NG/ML (ref 0–4)
GLUCOSE UR QL STRIP: NEGATIVE
HGB UR QL STRIP: NEGATIVE
HYALINE CASTS UR QL AUTO: 2 /LPF
KETONES UR QL STRIP: NEGATIVE
LEUKOCYTE ESTERASE UR QL STRIP: NEGATIVE
MICROSCOPIC COMMENT: ABNORMAL
NITRITE UR QL STRIP: NEGATIVE
PH UR STRIP: 7 [PH] (ref 5–8)
PROT UR QL STRIP: NEGATIVE
RBC #/AREA URNS AUTO: 2 /HPF (ref 0–4)
SP GR UR STRIP: 1.01 (ref 1–1.03)
TESTOST SERPL-MCNC: 147 NG/DL (ref 304–1227)
URN SPEC COLLECT METH UR: NORMAL
WBC #/AREA URNS AUTO: 2 /HPF (ref 0–5)

## 2020-06-16 PROCEDURE — 36415 COLL VENOUS BLD VENIPUNCTURE: CPT | Mod: HCNC

## 2020-06-16 PROCEDURE — 3079F DIAST BP 80-89 MM HG: CPT | Mod: HCNC,CPTII,S$GLB, | Performed by: INTERNAL MEDICINE

## 2020-06-16 PROCEDURE — 99999 PR PBB SHADOW E&M-EST. PATIENT-LVL IV: CPT | Mod: PBBFAC,HCNC,, | Performed by: INTERNAL MEDICINE

## 2020-06-16 PROCEDURE — 84153 ASSAY OF PSA TOTAL: CPT | Mod: HCNC

## 2020-06-16 PROCEDURE — 3074F PR MOST RECENT SYSTOLIC BLOOD PRESSURE < 130 MM HG: ICD-10-PCS | Mod: HCNC,CPTII,S$GLB, | Performed by: INTERNAL MEDICINE

## 2020-06-16 PROCEDURE — 99397 PR PREVENTIVE VISIT,EST,65 & OVER: ICD-10-PCS | Mod: HCNC,S$GLB,, | Performed by: INTERNAL MEDICINE

## 2020-06-16 PROCEDURE — 81001 URINALYSIS AUTO W/SCOPE: CPT | Mod: HCNC

## 2020-06-16 PROCEDURE — 84403 ASSAY OF TOTAL TESTOSTERONE: CPT | Mod: HCNC

## 2020-06-16 PROCEDURE — 99999 PR PBB SHADOW E&M-EST. PATIENT-LVL IV: ICD-10-PCS | Mod: PBBFAC,HCNC,, | Performed by: INTERNAL MEDICINE

## 2020-06-16 PROCEDURE — 3079F PR MOST RECENT DIASTOLIC BLOOD PRESSURE 80-89 MM HG: ICD-10-PCS | Mod: HCNC,CPTII,S$GLB, | Performed by: INTERNAL MEDICINE

## 2020-06-16 PROCEDURE — 99397 PER PM REEVAL EST PAT 65+ YR: CPT | Mod: HCNC,S$GLB,, | Performed by: INTERNAL MEDICINE

## 2020-06-16 PROCEDURE — 3074F SYST BP LT 130 MM HG: CPT | Mod: HCNC,CPTII,S$GLB, | Performed by: INTERNAL MEDICINE

## 2020-06-16 RX ORDER — LISINOPRIL AND HYDROCHLOROTHIAZIDE 10; 12.5 MG/1; MG/1
1 TABLET ORAL DAILY
Qty: 90 TABLET | Refills: 3 | Status: SHIPPED | OUTPATIENT
Start: 2020-06-16 | End: 2021-07-01 | Stop reason: SDUPTHER

## 2020-06-16 RX ORDER — PRAVASTATIN SODIUM 40 MG/1
40 TABLET ORAL DAILY
Qty: 90 TABLET | Refills: 3 | Status: SHIPPED | OUTPATIENT
Start: 2020-06-16 | End: 2021-07-01 | Stop reason: SDUPTHER

## 2020-06-16 NOTE — PROGRESS NOTES
Subjective:       Patient ID: Geovani Evans Jr. is a 74 y.o. male.    Chief Complaint: Annual Exam    HPI   C/o urinary urgency since January.  Some incontinence.       Exercising at [employer].  Water exercises and treadmill.    Chronic hypogonadism.  Not interested in therapy.    C/o gynecomastia.  ED.    No interval gout.    Long standing htn and hyperlipidemia.     bmi 34.  stable     Overdue to see ophtho.  Has retinal hole.     Thyroid u/s 10/17:  Multinodular thyroid with a previously sampled right thyroid nodule, and a mildly suspicious left thyroid nodule, one year followup is recommended.    Review of Systems   Constitutional: Negative for activity change and unexpected weight change.   HENT: Negative for postnasal drip, rhinorrhea and sinus pressure/congestion.    Respiratory: Negative for chest tightness and shortness of breath.    Cardiovascular: Negative for chest pain and leg swelling.   Gastrointestinal: Negative for abdominal pain, nausea and vomiting.   Genitourinary: Positive for urgency. Negative for difficulty urinating, dysuria and hematuria.   Integumentary:  Negative for rash.   Neurological: Negative for headaches.   Psychiatric/Behavioral: Negative for dysphoric mood and sleep disturbance. The patient is not nervous/anxious.          Objective:      Physical Exam  Constitutional:       Appearance: He is well-developed.   Eyes:      General: No scleral icterus.  Neck:      Thyroid: No thyromegaly.      Vascular: No JVD.   Cardiovascular:      Rate and Rhythm: Normal rate and regular rhythm.      Heart sounds: Normal heart sounds.   Pulmonary:      Effort: Pulmonary effort is normal. No respiratory distress.      Breath sounds: Normal breath sounds. No wheezing or rales.   Abdominal:      Palpations: Abdomen is soft. There is no mass.      Tenderness: There is no abdominal tenderness.   Neurological:      Mental Status: He is alert and oriented to person, place, and time.   Psychiatric:          Behavior: Behavior normal.         Results for orders placed or performed in visit on 03/18/20   CBC auto differential   Result Value Ref Range    WBC 6.62 3.90 - 12.70 K/uL    RBC 4.03 (L) 4.60 - 6.20 M/uL    Hemoglobin 11.2 (L) 14.0 - 18.0 g/dL    Hematocrit 37.2 (L) 40.0 - 54.0 %    Mean Corpuscular Volume 92 82 - 98 fL    Mean Corpuscular Hemoglobin 27.8 27.0 - 31.0 pg    Mean Corpuscular Hemoglobin Conc 30.1 (L) 32.0 - 36.0 g/dL    RDW 12.9 11.5 - 14.5 %    Platelets 197 150 - 350 K/uL    MPV 10.9 9.2 - 12.9 fL    Immature Granulocytes 0.2 0.0 - 0.5 %    Gran # (ANC) 3.0 1.8 - 7.7 K/uL    Immature Grans (Abs) 0.01 0.00 - 0.04 K/uL    Lymph # 2.9 1.0 - 4.8 K/uL    Mono # 0.5 0.3 - 1.0 K/uL    Eos # 0.2 0.0 - 0.5 K/uL    Baso # 0.05 0.00 - 0.20 K/uL    nRBC 0 0 /100 WBC    Gran% 45.0 38.0 - 73.0 %    Lymph% 43.4 18.0 - 48.0 %    Mono% 7.9 4.0 - 15.0 %    Eosinophil% 2.7 0.0 - 8.0 %    Basophil% 0.8 0.0 - 1.9 %    Differential Method Automated    Comprehensive metabolic panel   Result Value Ref Range    Sodium 142 136 - 145 mmol/L    Potassium 4.5 3.5 - 5.1 mmol/L    Chloride 107 95 - 110 mmol/L    CO2 28 23 - 29 mmol/L    Glucose 96 70 - 110 mg/dL    BUN, Bld 19 8 - 23 mg/dL    Creatinine 1.2 0.5 - 1.4 mg/dL    Calcium 9.1 8.7 - 10.5 mg/dL    Total Protein 7.4 6.0 - 8.4 g/dL    Albumin 3.8 3.5 - 5.2 g/dL    Total Bilirubin 0.4 0.1 - 1.0 mg/dL    Alkaline Phosphatase 57 55 - 135 U/L    AST 16 10 - 40 U/L    ALT 15 10 - 44 U/L    Anion Gap 7 (L) 8 - 16 mmol/L    eGFR if African American >60.0 >60 mL/min/1.73 m^2    eGFR if non African American >60.0 >60 mL/min/1.73 m^2   Lipid panel   Result Value Ref Range    Cholesterol 161 120 - 199 mg/dL    Triglycerides 144 30 - 150 mg/dL    HDL 41 40 - 75 mg/dL    LDL Cholesterol 91.2 63.0 - 159.0 mg/dL    Hdl/Cholesterol Ratio 25.5 20.0 - 50.0 %    Total Cholesterol/HDL Ratio 3.9 2.0 - 5.0    Non-HDL Cholesterol 120 mg/dL     Assessment:       1. Annual physical exam    2.  Urinary incontinence, unspecified type    3. Male hypogonadism    4. Multinodular goiter (nontoxic)    5. Essential hypertension    6. Mixed hyperlipidemia    7. Anemia, unspecified type    8. Gout, unspecified cause, unspecified chronicity, unspecified site    9. Macular hole, unspecified laterality    10. Encounter for screening for malignant neoplasm of prostate     11. Chronic anemia        Plan:       Geovani was seen today for annual exam.    Diagnoses and all orders for this visit:    Annual physical exam    Urinary incontinence, unspecified type  -     Ambulatory referral/consult to Urology; Future  -     Urinalysis, Reflex to Urine Culture Urine, Clean Catch    Male hypogonadism  -     Ambulatory referral/consult to Urology; Future  -     Testosterone; Future  -     PSA, Screening; Future    Multinodular goiter (nontoxic)  -     US Soft Tissue Head Neck Thyroid; Future    Essential hypertension    Mixed hyperlipidemia    Anemia, unspecified type    Gout, unspecified cause, unspecified chronicity, unspecified site    Macular hole, unspecified laterality  -     Ambulatory referral/consult to Ophthalmology; Future    Encounter for screening for malignant neoplasm of prostate   -     PSA, Screening; Future    Chronic anemia    Other orders  -     lisinopriL-hydrochlorothiazide (PRINZIDE,ZESTORETIC) 10-12.5 mg per tablet; Take 1 tablet by mouth once daily.  -     pravastatin (PRAVACHOL) 40 MG tablet; Take 1 tablet (40 mg total) by mouth once daily.         He now wants testosterone therapy.    Will check levels and refer to Dr Angie Dye when ready.

## 2020-06-18 ENCOUNTER — HOSPITAL ENCOUNTER (OUTPATIENT)
Dept: RADIOLOGY | Facility: HOSPITAL | Age: 75
Discharge: HOME | End: 2020-06-18
Attending: INTERNAL MEDICINE
Payer: MEDICARE

## 2020-06-18 ENCOUNTER — TELEPHONE (OUTPATIENT)
Dept: INTERNAL MEDICINE | Facility: CLINIC | Age: 75
End: 2020-06-18

## 2020-06-18 DIAGNOSIS — E04.2 MULTINODULAR GOITER (NONTOXIC): ICD-10-CM

## 2020-06-18 PROCEDURE — 76536 US EXAM OF HEAD AND NECK: CPT | Mod: TC,HCNC

## 2020-06-18 PROCEDURE — 76536 US SOFT TISSUE HEAD NECK THYROID: ICD-10-PCS | Mod: 26,HCNC,, | Performed by: RADIOLOGY

## 2020-06-18 PROCEDURE — 76536 US EXAM OF HEAD AND NECK: CPT | Mod: 26,HCNC,, | Performed by: RADIOLOGY

## 2020-06-18 NOTE — TELEPHONE ENCOUNTER
----- Message from Julia Fonseca MD sent at 6/18/2020  2:31 PM CDT -----  pls call - thyroid ultrasound stable.  No need for further evaluation.    PSA normal.  Testosterone low - keep urology appt.

## 2020-06-25 ENCOUNTER — OFFICE VISIT (OUTPATIENT)
Dept: UROLOGY | Facility: CLINIC | Age: 75
End: 2020-06-25
Payer: MEDICARE

## 2020-06-25 VITALS
HEART RATE: 73 BPM | BODY MASS INDEX: 34.41 KG/M2 | DIASTOLIC BLOOD PRESSURE: 77 MMHG | SYSTOLIC BLOOD PRESSURE: 137 MMHG | WEIGHT: 227.06 LBS | HEIGHT: 68 IN

## 2020-06-25 DIAGNOSIS — N39.41 URGE INCONTINENCE: ICD-10-CM

## 2020-06-25 DIAGNOSIS — N40.1 BPH WITH URINARY OBSTRUCTION: Primary | ICD-10-CM

## 2020-06-25 DIAGNOSIS — N13.8 BPH WITH URINARY OBSTRUCTION: Primary | ICD-10-CM

## 2020-06-25 DIAGNOSIS — R32 URINARY INCONTINENCE, UNSPECIFIED TYPE: ICD-10-CM

## 2020-06-25 LAB
BILIRUB SERPL-MCNC: NORMAL MG/DL
BLOOD URINE, POC: NORMAL
CLARITY, POC UA: NORMAL
COLOR, POC UA: YELLOW
GLUCOSE UR QL STRIP: NORMAL
KETONES UR QL STRIP: NORMAL
LEUKOCYTE ESTERASE URINE, POC: NORMAL
NITRITE, POC UA: NORMAL
PH, POC UA: 5
POC RESIDUAL URINE VOLUME: 33 ML (ref 0–100)
PROTEIN, POC: NORMAL
SPECIFIC GRAVITY, POC UA: 1.01
UROBILINOGEN, POC UA: NORMAL

## 2020-06-25 PROCEDURE — 1159F MED LIST DOCD IN RCRD: CPT | Mod: HCNC,S$GLB,, | Performed by: NURSE PRACTITIONER

## 2020-06-25 PROCEDURE — 51798 PR MEAS,POST-VOID RES,US,NON-IMAGING: ICD-10-PCS | Mod: HCNC,S$GLB,, | Performed by: NURSE PRACTITIONER

## 2020-06-25 PROCEDURE — 1126F AMNT PAIN NOTED NONE PRSNT: CPT | Mod: HCNC,S$GLB,, | Performed by: NURSE PRACTITIONER

## 2020-06-25 PROCEDURE — 99999 PR PBB SHADOW E&M-EST. PATIENT-LVL IV: ICD-10-PCS | Mod: PBBFAC,HCNC,, | Performed by: NURSE PRACTITIONER

## 2020-06-25 PROCEDURE — 1159F PR MEDICATION LIST DOCUMENTED IN MEDICAL RECORD: ICD-10-PCS | Mod: HCNC,S$GLB,, | Performed by: NURSE PRACTITIONER

## 2020-06-25 PROCEDURE — 3078F PR MOST RECENT DIASTOLIC BLOOD PRESSURE < 80 MM HG: ICD-10-PCS | Mod: HCNC,CPTII,S$GLB, | Performed by: NURSE PRACTITIONER

## 2020-06-25 PROCEDURE — 3075F PR MOST RECENT SYSTOLIC BLOOD PRESS GE 130-139MM HG: ICD-10-PCS | Mod: HCNC,CPTII,S$GLB, | Performed by: NURSE PRACTITIONER

## 2020-06-25 PROCEDURE — 81002 URINALYSIS NONAUTO W/O SCOPE: CPT | Mod: HCNC,S$GLB,, | Performed by: NURSE PRACTITIONER

## 2020-06-25 PROCEDURE — 51798 US URINE CAPACITY MEASURE: CPT | Mod: HCNC,S$GLB,, | Performed by: NURSE PRACTITIONER

## 2020-06-25 PROCEDURE — 1101F PT FALLS ASSESS-DOCD LE1/YR: CPT | Mod: HCNC,CPTII,S$GLB, | Performed by: NURSE PRACTITIONER

## 2020-06-25 PROCEDURE — 99203 OFFICE O/P NEW LOW 30 MIN: CPT | Mod: HCNC,25,S$GLB, | Performed by: NURSE PRACTITIONER

## 2020-06-25 PROCEDURE — 81002 POCT URINE DIPSTICK WITHOUT MICROSCOPE: ICD-10-PCS | Mod: HCNC,S$GLB,, | Performed by: NURSE PRACTITIONER

## 2020-06-25 PROCEDURE — 99203 PR OFFICE/OUTPT VISIT, NEW, LEVL III, 30-44 MIN: ICD-10-PCS | Mod: HCNC,25,S$GLB, | Performed by: NURSE PRACTITIONER

## 2020-06-25 PROCEDURE — 3078F DIAST BP <80 MM HG: CPT | Mod: HCNC,CPTII,S$GLB, | Performed by: NURSE PRACTITIONER

## 2020-06-25 PROCEDURE — 1101F PR PT FALLS ASSESS DOC 0-1 FALLS W/OUT INJ PAST YR: ICD-10-PCS | Mod: HCNC,CPTII,S$GLB, | Performed by: NURSE PRACTITIONER

## 2020-06-25 PROCEDURE — 1126F PR PAIN SEVERITY QUANTIFIED, NO PAIN PRESENT: ICD-10-PCS | Mod: HCNC,S$GLB,, | Performed by: NURSE PRACTITIONER

## 2020-06-25 PROCEDURE — 99999 PR PBB SHADOW E&M-EST. PATIENT-LVL IV: CPT | Mod: PBBFAC,HCNC,, | Performed by: NURSE PRACTITIONER

## 2020-06-25 PROCEDURE — 3075F SYST BP GE 130 - 139MM HG: CPT | Mod: HCNC,CPTII,S$GLB, | Performed by: NURSE PRACTITIONER

## 2020-06-25 RX ORDER — TAMSULOSIN HYDROCHLORIDE 0.4 MG/1
0.4 CAPSULE ORAL NIGHTLY
Qty: 90 CAPSULE | Refills: 3 | Status: SHIPPED | OUTPATIENT
Start: 2020-06-25 | End: 2022-10-13

## 2020-06-25 NOTE — LETTER
June 25, 2020      Julia Fonseca MD  [address]  [place] [address]           [facility]  [address]  [place] [address]  Phone: [phone]          Patient: Geovani Evans Jr.   MR Number: [id number]   YOB: 1945   Date of Visit: 6/25/2020       Dear Dr. Julia Fonseca:    Thank you for referring Geovani Evans to me for evaluation. Attached you will find relevant portions of my assessment and plan of care.    If you have questions, please do not hesitate to call me. I look forward to following Geovani Evans along with you.    Sincerely,    Anika Mercer, NP    Enclosure  CC:  No Recipients    If you would like to receive this communication electronically, please contact [email or web address] or [phone] to request more information on [employer] Link access.    For providers and/or their staff who would like to refer a patient to [facility], please contact us through our one-stop-shop provider referral line, [facility], at [phone].    If you feel you have received this communication in error or would no longer like to receive these types of communications, please e-mail [email or web address]

## 2020-06-25 NOTE — PATIENT INSTRUCTIONS
BPH (Enlarged Prostate)  The prostate is a gland at the base of the bladder. As some men get older, the prostate may get bigger in size. This problem is called benign prostatic hyperplasia (BPH). BPH puts pressure on the urethra. This is the tube that carries urine from the bladder to the penis. It may interfere with the flow of urine. It may also keep the bladder from emptying fully.    Symptoms of BPH include trouble starting urination and feeling as though the bladder isnt emptying all the way. It also includes a weak urine stream, dribbling and leaking of urine, and frequent and urgent urination (especially at night). BPH can increase the risk of urinary infections. It can also block off urine flow completely. If this occurs, a thin tube (catheter) may be passed into the bladder to help drain urine.  If symptoms are mild, no treatment may be needed right now. If symptoms are more severe, treatment is likely needed. The goal of treatment is to improve urine flow and reduce symptoms. Treatments can include medicine and procedures. Your healthcare provider will discuss treatment options with you as needed.  Home care  The following guidelines will help you care for yourself at home:  · Urinate as soon as you feel the urge. Don't try to hold your urine.  · Don't limit your fluid intake during the day. Drink 6 to 8 glasses of water or liquids a day. This prevents bacteria from building up in the bladder.  · Avoid drinking fluids after dinner to help reduce urination during the night.  · Avoid medicines that can worsen your symptoms. These include certain cold and allergy medicines and antidepressants. Diuretics used for high blood pressure can also worsen symptoms. Talk to your doctor about the medicines you take. Other choices may work better for you.  Prostate cancer screening  BPH does not increase the risk of prostate cancer. But because prostate cancer is a common cancer in men, screening is sometimes  recommended. This may help detect the cancer in its early stages when treatment is most effective. Factors that can increase the risk of prostate cancer include being -American or having a father or brother who had prostate cancer. A high-fat diet may also increase the risk of prostate cancer. Talk to your healthcare provider to see whether you should be screened for prostate cancer.  Follow-up care  Follow up with your healthcare provider, or as advised  To learn more, go to:  · National Kidney & Urologic Diseases Information Clearinghouse  kidney.niddk.nih.gov, 949.312.5679  When to seek medical advice  Call your healthcare provider right away if any of these occur:  · Fever of 100.4°F (38.0°C) or higher, or as advised  · Unable to pass urine for 8 hours  · Increasing pressure or pain in your bladder (lower abdomen)  · Blood in the urine  · Increasing low back pain, not related to injury  · Symptoms of urinary infection (increased urge to urinate, burning when passing urine, foul-smelling urine)  Date Last Reviewed: 7/1/2016 © 2000-2017 [employer]. [address] [place] [address]. All rights reserved. This information is not intended as a substitute for professional medical care. Always follow your healthcare professional's instructions.        Urinary Incontinence (Male)    Urinary incontinence means not being able to control the release of urine from the bladder.  Causes  Common causes of urinary incontinence in men include:  · Infection  · Certain medicines  · Aging  · Poor pelvic muscle tone  · Bladder spasms  · Obesity  · Urinary retention  Nervous system diseases, diabetes, sleep apnea, urinary tract infections, prostate surgery, and pelvic trauma can also cause incontinence. Constipation and smoking have also been identified as risk factors.  Symptoms  · Urge incontinence (also called overactive bladder) is a sudden urge to urinate even though there may not be much urine  in the bladder. The need to urinate often during the night is common. It is due to bladder spasms.  · Stress incontinence is involuntary urine leakage that can occur with sneezing, coughing, and other actions that put stress on the bladder.  Treatment  Treatment of urinary incontinence depends on the cause. Infections of the bladder are treated with antibiotics. Urinary retention is treated with a bladder catheter.  Home care  Follow these guidelines when caring for yourself at home:  · Avoid foods and drinks that may irritate the bladder. These include drinks containing alcohol, caffeinate, or carbonation; chocolate; and acidic fruits and juices).  · Limit fluid intake to 6 to 8 cups a day.  · Lose weight if you are overweight. This will reduce your symptoms.  · If needed, wear absorbent pads to catch urine. Change pads frequently to maintain hygiene and prevent skin and bladder infections.  · Bathe daily to maintain good hygiene.  · If an antibiotic was prescribed to treat a bladder infection, be sure to take it until finished, even if you are feeling better before then. This is to make sure your infection has cleared.  · If a catheter was left in place, it is important to keep bacteria from getting into the collection bag. Do not disconnect the catheter from the collection bag.  · Use a leg band to secure the catheter drainage tube, so it does not pull on the catheter. Drain the collection bag when it becomes full using the drain spout at the bottom of the bag. Do not disconnect the bag from the catheter.  · Do not pull on or try to remove a catheter. The catheter must be removed by a healthcare provider.  Follow-up care  Follow up with your healthcare provider, or as advised.  When to seek medical advice  Call your healthcare provider right away if any of these occur:  · Fever over 100.4ºF (38°C), or as directed by your healthcare provider  · Bladder pain or fullness  · Abdominal swelling, nausea, or  vomiting  · Back pain  · Weakness, dizziness, or fainting  · If a catheter was left in place, return if:  ¨ Catheter falls out  ¨ Catheter stops draining for 6 hours  Date Last Reviewed: 7/26/2015 © 2000-2017 [employer]. [address] [place] [address], [place] [address]. All rights reserved. This information is not intended as a substitute for professional medical care. Always follow your healthcare professional's instructions.        Prostate Problems and Related Urinary Symptoms    Many men have problems with the prostate at some time in their lives. The prostate gland is part of the male reproductive system. Its located just below the bladder. The prostate surrounds the urethra (the tube that carries urine and semen out of the body). The main function of the prostate gland is to add fluid to the semen. When problems occur in the prostate, the bladder and urethra are often affected as well. The most common prostate problems are described below.  BPH  BPH (benign prostatic hyperplasia) develops when changing hormone levels cause the prostate to grow larger. This often starts around age 50. Excess tissue can block the urethra, making it harder for urine to flow. The enlarged prostate can also press on the bladder, so you may need to urinate more often. Other symptoms include straining during urination, a weak urine stream, urinating more at night, incontinence, dribbling at the end of urination, and feeling that the bladder isnt emptying all the way. Note that BPH is not cancer and does not cause cancer.  How BPH affects the bladder  Pushing to urinate through a narrowed urethra can cause the bladder walls to thicken or stretch out of shape. A stretched bladder may have problems emptying all the way. If urine stays in the bladder longer than it should, you may develop an infection or bladder stones. Also, the kidneys cant drain properly into a bladder that doesnt empty completely. This can lead to kidney  failure. Pressure from urine buildup can also cause leaking of urine (called overflow incontinence).  Other prostate problems  · Prostatitis is an infection or inflammation that causes the prostate to become painful and swollen. The swelling narrows the urethra and can block the bladder neck. Prostatitis can cause a burning sensation during urination. You may also feel pressure or pain in the genital area. In some cases, prostatitis can cause fever and chills, and can make you very sick.  · Cancer occurs when abnormal cells form a tumor (a lump of cells that grow uncontrolled). Some tumors can be felt during a physical exam, others cant. Prostate cancer often causes no symptoms at all, especially in its early stages. Prostate symptoms are more likely to be caused by a problem that is NOT cancer.   Date Last Reviewed: 1/1/2017 © 2000-2017 [employer]. [address] [place] [address], [place] [address]. All rights reserved. This information is not intended as a substitute for professional medical care. Always follow your healthcare professional's instructions.

## 2020-06-25 NOTE — PROGRESS NOTES
Subjective:       Patient ID: Geovani Evans Jr. is a 74 y.o. male.    Chief Complaint: Urinary Frequency    Geovani Evans Jr. is a 74 y.o. male who is a new patient to Urology.  He is here due to some LUTS; urinary frequency and some urge incontinence.  Has low T, but ok with it.  He reports that developed breast while on HRT; so he stopped  ED >1 year    He drinks coffee; tea at night; sometimes water                               PSA                      1.7                 06/16/2020                 PSA                      3.5                 10/12/2017                 PSA                      1.9                 01/10/2015                 PSA                      1.7                 10/29/2013                 PSA                      1.8                 06/08/2011                 PSA                      1.4                 06/02/2010                 PSA                      1.3                 08/04/2008                 PSA                      1.4                 03/24/2008                 PSA                      1.1                 03/16/2007                    Past Medical History:  No date: Anemia  No date: Hypertension  No date: Hypogonadism male  No date: Obesity  No date: Thyroid nodule      Comment:  neg bx 2/2011    Past Surgical History:  No date: EYE SURGERY; Left      Comment:  cataract   1970: FRACTURE SURGERY      Comment:  left great toe. bunion repair/shaved bone    Review of patient's family history indicates:  Problem: Stroke      Relation: Father          Age of Onset: (Not Specified)  Problem: Heart disease      Relation: Father          Age of Onset: (Not Specified)  Problem: No Known Problems      Relation: Mother          Age of Onset: (Not Specified)  Problem: No Known Problems      Relation: Brother          Age of Onset: (Not Specified)  Problem: No Known Problems      Relation: Daughter          Age of Onset: (Not Specified)  Problem: No Known Problems      Relation: Daughter           Age of Onset: (Not Specified)  Problem: No Known Problems      Relation: Daughter          Age of Onset: (Not Specified)  Problem: Blindness      Relation: Neg Hx          Age of Onset: (Not Specified)  Problem: Cataracts      Relation: Neg Hx          Age of Onset: (Not Specified)  Problem: Diabetes      Relation: Neg Hx          Age of Onset: (Not Specified)  Problem: Glaucoma      Relation: Neg Hx          Age of Onset: (Not Specified)  Problem: Hypertension      Relation: Neg Hx          Age of Onset: (Not Specified)  Problem: Macular degeneration      Relation: Neg Hx          Age of Onset: (Not Specified)  Problem: Retinal detachment      Relation: Neg Hx          Age of Onset: (Not Specified)  Problem: Strabismus      Relation: Neg Hx          Age of Onset: (Not Specified)  Problem: Thyroid disease      Relation: Neg Hx          Age of Onset: (Not Specified)  Problem: Thyroid cancer      Relation: Neg Hx          Age of Onset: (Not Specified)      Social History    Socioeconomic History      Marital status:       Spouse name: Not on file      Number of children: Not on file      Years of education: Not on file      Highest education level: Not on file    Occupational History      Occupation:         Comment: shell      Occupation: [employer]        Comment: nelly    Social Needs      Financial resource strain: Not on file      Food insecurity        Worry: Not on file        Inability: Not on file      Transportation needs        Medical: Not on file        Non-medical: Not on file    Tobacco Use      Smoking status: Never Smoker      Smokeless tobacco: Never Used    Substance and Sexual Activity      Alcohol use: No      Drug use: No      Sexual activity: Not Currently    Lifestyle      Physical activity        Days per week: Not on file        Minutes per session: Not on file      Stress: Not on file    Relationships      Social connections        Talks on phone: Not on file         Gets together: Not on file        Attends Spiritism service: Not on file        Active member of club or organization: Not on file        Attends meetings of clubs or organizations: Not on file        Relationship status: Not on file    Other Topics      Concerns:        Not on file    Social History Narrative      Water aerobics at [employer] 3 days a week.      Retired.      To celebrate 52th wedding celebration.      Allergies:  Patient has no known allergies.    Medications:  Current Outpatient Medications:   lisinopriL-hydrochlorothiazide (PRINZIDE,ZESTORETIC) 10-12.5 mg per tablet, Take 1 tablet by mouth once daily., Disp: 90 tablet, Rfl: 3  pravastatin (PRAVACHOL) 40 MG tablet, Take 1 tablet (40 mg total) by mouth once daily., Disp: 90 tablet, Rfl: 3        Review of Systems   Constitutional: Negative for activity change, appetite change, chills and fever.   HENT: Negative for facial swelling and trouble swallowing.    Eyes: Negative for visual disturbance.   Respiratory: Negative for chest tightness and shortness of breath.    Cardiovascular: Negative for chest pain and palpitations.   Gastrointestinal: Negative.  Negative for abdominal pain, constipation, diarrhea, nausea and vomiting.   Genitourinary: Positive for frequency, nocturia and urgency. Negative for difficulty urinating, dysuria, flank pain, hematuria, penile pain, penile swelling, scrotal swelling and testicular pain.        FOS slow in the AM;  Nocturia 1-2x depending on fluid intake   Musculoskeletal: Negative for back pain, gait problem, myalgias and neck stiffness.        Breast enlargement     Skin: Negative for rash.   Neurological: Negative for dizziness and speech difficulty.   Hematological: Does not bruise/bleed easily.   Psychiatric/Behavioral: Negative for behavioral problems.       Objective:      Physical Exam   Nursing note and vitals reviewed.  Constitutional: He is oriented to person, place, and time. He appears well-developed.  He is cooperative.  Non-toxic appearance.   Urine dipped clear of infection.  PVR in the office today by the nurse was 33.     HENT:   Head: Normocephalic and atraumatic.   Right Ear: External ear normal.   Left Ear: External ear normal.   Nose: Nose normal.   Eyes: Conjunctivae and lids are normal. No scleral icterus.   Neck: Trachea normal, normal range of motion and full passive range of motion without pain. Neck supple. No JVD present. No tracheal deviation present.   Cardiovascular: Normal rate, S1 normal and S2 normal.    Pulmonary/Chest: Effort normal. No respiratory distress. He exhibits no tenderness.   Bilateral gynecomastia; no indurations or nodules noted.     Abdominal: Soft. Normal appearance. There is no abdominal tenderness.   Genitourinary:    Testes, penis and rectum normal.   Rectum:      No rectal mass, tenderness or external hemorrhoid.   Prostate is enlarged. Right testis shows no mass, no swelling and no tenderness. Left testis shows no mass, no swelling and no tenderness. No phimosis, paraphimosis, hypospadias, penile erythema or penile tenderness. No discharge found.       Musculoskeletal: Normal range of motion.   Lymphadenopathy: No inguinal adenopathy noted on the right or left side.   Neurological: He is alert and oriented to person, place, and time.   Skin: Skin is warm and dry.     Psychiatric: His behavior is normal. Judgment and thought content normal.       Assessment:       1. BPH with urinary obstruction    2. Urinary incontinence, unspecified type    3. Urge incontinence        Plan:         I spent 30 minutes with the patient of which more than half was spent in direct consultation with the patient in regards to our treatment and plan.    Education and recommendations of today's plan of care including home remedies.  We discussed his LUTS and contributory factors  Diet modifications/avoiding bladder irritants.  Rx for trial of Flomax given: benefits, risks, se  discussed.  Discussed the gynecomastia. Monitor; ask staff about referral

## 2020-08-26 ENCOUNTER — PES CALL (OUTPATIENT)
Dept: ADMINISTRATIVE | Facility: CLINIC | Age: 75
End: 2020-08-26

## 2020-09-09 ENCOUNTER — IMMUNIZATION (OUTPATIENT)
Dept: INTERNAL MEDICINE | Facility: CLINIC | Age: 75
End: 2020-09-09
Payer: MEDICARE

## 2020-09-09 ENCOUNTER — OFFICE VISIT (OUTPATIENT)
Dept: INTERNAL MEDICINE | Facility: CLINIC | Age: 75
End: 2020-09-09
Payer: MEDICARE

## 2020-09-09 VITALS
BODY MASS INDEX: 34.83 KG/M2 | WEIGHT: 229.06 LBS | SYSTOLIC BLOOD PRESSURE: 106 MMHG | OXYGEN SATURATION: 96 % | DIASTOLIC BLOOD PRESSURE: 70 MMHG | HEART RATE: 66 BPM

## 2020-09-09 DIAGNOSIS — D64.9 ANEMIA, UNSPECIFIED TYPE: ICD-10-CM

## 2020-09-09 DIAGNOSIS — E04.2 MULTINODULAR GOITER (NONTOXIC): ICD-10-CM

## 2020-09-09 DIAGNOSIS — E66.9 OBESITY, CLASS I, BMI 30-34.9: ICD-10-CM

## 2020-09-09 DIAGNOSIS — N40.1 BENIGN PROSTATIC HYPERPLASIA WITH LOWER URINARY TRACT SYMPTOMS, SYMPTOM DETAILS UNSPECIFIED: ICD-10-CM

## 2020-09-09 DIAGNOSIS — K90.49 CARBOHYDRATE INTOLERANCE: ICD-10-CM

## 2020-09-09 DIAGNOSIS — H35.033 HYPERTENSIVE RETINOPATHY OF BOTH EYES: ICD-10-CM

## 2020-09-09 DIAGNOSIS — Z00.00 ENCOUNTER FOR PREVENTIVE HEALTH EXAMINATION: Primary | ICD-10-CM

## 2020-09-09 DIAGNOSIS — E78.5 HYPERLIPIDEMIA, UNSPECIFIED HYPERLIPIDEMIA TYPE: ICD-10-CM

## 2020-09-09 DIAGNOSIS — I10 ESSENTIAL HYPERTENSION: ICD-10-CM

## 2020-09-09 PROBLEM — N40.0 BPH (BENIGN PROSTATIC HYPERPLASIA): Status: ACTIVE | Noted: 2020-09-09

## 2020-09-09 PROCEDURE — 3078F PR MOST RECENT DIASTOLIC BLOOD PRESSURE < 80 MM HG: ICD-10-PCS | Mod: HCNC,CPTII,S$GLB, | Performed by: NURSE PRACTITIONER

## 2020-09-09 PROCEDURE — 3074F PR MOST RECENT SYSTOLIC BLOOD PRESSURE < 130 MM HG: ICD-10-PCS | Mod: HCNC,CPTII,S$GLB, | Performed by: NURSE PRACTITIONER

## 2020-09-09 PROCEDURE — G0439 PR MEDICARE ANNUAL WELLNESS SUBSEQUENT VISIT: ICD-10-PCS | Mod: HCNC,S$GLB,, | Performed by: NURSE PRACTITIONER

## 2020-09-09 PROCEDURE — G0008 ADMIN INFLUENZA VIRUS VAC: HCPCS | Mod: HCNC,S$GLB,, | Performed by: INTERNAL MEDICINE

## 2020-09-09 PROCEDURE — 90694 FLU VACCINE - QUADRIVALENT - ADJUVANTED: ICD-10-PCS | Mod: HCNC,S$GLB,, | Performed by: INTERNAL MEDICINE

## 2020-09-09 PROCEDURE — G0439 PPPS, SUBSEQ VISIT: HCPCS | Mod: HCNC,S$GLB,, | Performed by: NURSE PRACTITIONER

## 2020-09-09 PROCEDURE — 99999 PR PBB SHADOW E&M-EST. PATIENT-LVL III: CPT | Mod: PBBFAC,HCNC,, | Performed by: NURSE PRACTITIONER

## 2020-09-09 PROCEDURE — G0008 PR ADMIN INFLUENZA VIRUS VAC: ICD-10-PCS | Mod: HCNC,S$GLB,, | Performed by: INTERNAL MEDICINE

## 2020-09-09 PROCEDURE — 99999 PR PBB SHADOW E&M-EST. PATIENT-LVL III: ICD-10-PCS | Mod: PBBFAC,HCNC,, | Performed by: NURSE PRACTITIONER

## 2020-09-09 PROCEDURE — 3074F SYST BP LT 130 MM HG: CPT | Mod: HCNC,CPTII,S$GLB, | Performed by: NURSE PRACTITIONER

## 2020-09-09 PROCEDURE — 90694 VACC AIIV4 NO PRSRV 0.5ML IM: CPT | Mod: HCNC,S$GLB,, | Performed by: INTERNAL MEDICINE

## 2020-09-09 PROCEDURE — 3078F DIAST BP <80 MM HG: CPT | Mod: HCNC,CPTII,S$GLB, | Performed by: NURSE PRACTITIONER

## 2020-09-09 NOTE — PROGRESS NOTES
Geovani Evans presented for a  Medicare AWV and comprehensive Health Risk Assessment today. The following components were reviewed and updated:    · Medical history  · Family History  · Social history  · Allergies and Current Medications  · Health Risk Assessment  · Health Maintenance  · Care Team     ** See Completed Assessments for Annual Wellness Visit within the encounter summary.**         The following assessments were completed:  · Living Situation  · CAGE  · Depression Screening  · Timed Get Up and Go  · Whisper Test  · Cognitive Function Screening      ·   · Nutrition Screening  · ADL Screening  · PAQ Screening        Vitals:    09/09/20 0835   BP: 106/70   BP Location: Right arm   Pulse: 66   SpO2: 96%   Weight: 103.9 kg (229 lb 0.9 oz)     Body mass index is 34.83 kg/m².  Physical Exam  Vitals signs and nursing note reviewed.   Constitutional:       Appearance: He is well-developed. He is obese.   HENT:      Head: Normocephalic.   Cardiovascular:      Rate and Rhythm: Normal rate and regular rhythm.   Pulmonary:      Effort: Pulmonary effort is normal.      Breath sounds: Normal breath sounds.   Abdominal:      General: Bowel sounds are normal.      Palpations: Abdomen is soft.   Musculoskeletal: Normal range of motion.   Skin:     General: Skin is warm and dry.   Neurological:      Mental Status: He is alert and oriented to person, place, and time.      Motor: No abnormal muscle tone.   Psychiatric:         Mood and Affect: Mood normal.               Diagnoses and health risks identified today and associated recommendations/orders:    1. Encounter for preventive health examination  Here for Health Risk Assessment/Annual Wellness Visit.  Health maintenance reviewed and updated. Follow up in one year.  Prescription given for Shingrix    2. Essential hypertension  Chronic, stable on current medications. Followed by PCP.    3. Hyperlipidemia, unspecified hyperlipidemia type  Chronic, stable on current  medication. Followed by PCP.    4. Anemia, unspecified type  Chronic, stable. Followed by PCP.    5. Obesity, Class I, BMI 30-34.9  Chronic, reports he is exercising 2-3 times weekly, reinforced diet per PCP recommendations. Followed by PCP.    6. Multinodular goiter (nontoxic)  Chronic, stable. Followed by PCP    7. Carbohydrate intolerance  Chronic, stable. Last FBS WNL. Followed by PCP.    8. Hypertensive retinopathy of both eyes  Chronic, stable. Followed by Ophthalmology.    9. Benign prostatic hyperplasia with lower urinary tract symptoms, symptom details unspecified  Chronic, reports that tamsulosin is causing HA and he is taking QOD. Followed by PCP, Urology.      Provided Olivo with a 5-10 year written screening schedule and personal prevention plan. Recommendations were developed using the USPSTF age appropriate recommendations. Education, counseling, and referrals were provided as needed. After Visit Summary printed and given to patient which includes a list of additional screenings\tests needed.    Follow up in about 9 months (around 6/18/2021).with PCP    Gilda Hobbs NP

## 2020-09-09 NOTE — PATIENT INSTRUCTIONS
Counseling and Referral of Other Preventative  (Italic type indicates deductible and co-insurance are waived)    Patient Name: Geovani Evans  Today's Date: 9/9/2020    Health Maintenance       Date Due Completion Date    Shingles Vaccine (1 of 2) 10/24/1997 Obtain new shingles vaccine - SHINGRIX - when available    Influenza Vaccine (1) 08/01/2020 10/11/2017    Lipid Panel 03/18/2021 3/18/2020    Colorectal Cancer Screening 07/18/2021 7/18/2011    TETANUS VACCINE 11/04/2023 11/4/2013        No orders of the defined types were placed in this encounter.    The following information is provided to all patients.  This information is to help you find resources for any of the problems found today that may be affecting your health:                Living healthy guide: www.[facility].[place].gov      Understanding Diabetes: www.diabetes.org      Eating healthy: www.cdc.gov/healthyweight      [facility] home safety checklist: www.cdc.gov/steadi/patient.html      Agency on Aging: www.goea.[place].gov      Alcoholics anonymous (AA): www.aa.org      Physical Activity: www.sophie.nih.gov/iy7pvnl      Tobacco use: www.quitwithusla.org

## 2020-09-11 ENCOUNTER — NURSE TRIAGE (OUTPATIENT)
Dept: ADMINISTRATIVE | Facility: CLINIC | Age: 75
End: 2020-09-11

## 2020-09-11 ENCOUNTER — HOSPITAL ENCOUNTER (EMERGENCY)
Facility: HOSPITAL | Age: 75
Discharge: HOME | End: 2020-09-11
Attending: EMERGENCY MEDICINE
Payer: MEDICARE

## 2020-09-11 VITALS
BODY MASS INDEX: 33.95 KG/M2 | HEIGHT: 68 IN | OXYGEN SATURATION: 95 % | WEIGHT: 224 LBS | HEART RATE: 63 BPM | DIASTOLIC BLOOD PRESSURE: 72 MMHG | RESPIRATION RATE: 16 BRPM | TEMPERATURE: 98 F | SYSTOLIC BLOOD PRESSURE: 116 MMHG

## 2020-09-11 DIAGNOSIS — K62.5 RECTAL BLEEDING: Primary | ICD-10-CM

## 2020-09-11 LAB
BUN SERPL-MCNC: 18 MG/DL (ref 6–30)
CHLORIDE SERPL-SCNC: 102 MMOL/L (ref 95–110)
CREAT SERPL-MCNC: 1.3 MG/DL (ref 0.5–1.4)
GLUCOSE SERPL-MCNC: 105 MG/DL (ref 70–110)
HCT VFR BLD CALC: 37 %PCV (ref 36–54)
POC IONIZED CALCIUM: 1.22 MMOL/L (ref 1.06–1.42)
POC TCO2 (MEASURED): 27 MMOL/L (ref 23–29)
POTASSIUM BLD-SCNC: 4.7 MMOL/L (ref 3.5–5.1)
SAMPLE: NORMAL
SODIUM BLD-SCNC: 139 MMOL/L (ref 136–145)

## 2020-09-11 PROCEDURE — 99283 EMERGENCY DEPT VISIT LOW MDM: CPT | Mod: 25,HCNC

## 2020-09-11 PROCEDURE — 80047 BASIC METABLC PNL IONIZED CA: CPT | Mod: HCNC

## 2020-09-11 PROCEDURE — 99284 EMERGENCY DEPT VISIT MOD MDM: CPT | Mod: HCNC,,, | Performed by: EMERGENCY MEDICINE

## 2020-09-11 PROCEDURE — 99284 PR EMERGENCY DEPT VISIT,LEVEL IV: ICD-10-PCS | Mod: HCNC,,, | Performed by: EMERGENCY MEDICINE

## 2020-09-11 NOTE — ED PROVIDER NOTES
Encounter Date: 9/11/2020    SCRIBE #1 NOTE: I, Neha Ferreira, am scribing for, and in the presence of,  Massimo Ramirez MD. I have scribed the entire note.       History     Chief Complaint   Patient presents with    Rectal Bleeding     states noticed blood on his boxers this am/bright red-- since, 3 episodes of blood-tinged stool= with blood on toilet paper after wiping/ described as bright red/ no pain     The patient is a 74 y.o. male with past medical history of hypertension, anemia, who presents to the ED with a complaint of rectal bleeding onset this morning. The patient reports he woke up this morning with bright red blood on his boxers. He had 3 episodes of bloody bowel movement today. Notes of bright red blood with each episode. Denies any rectal pain, no abdominal pain. He does not take any aspirin or Goody's powder. Denies alcohol use. No prior history of rectal bleeding.       The history is provided by the patient and medical records. No  was used.     Review of patient's allergies indicates:  No Known Allergies  Past Medical History:   Diagnosis Date    Anemia     Hypertension     Hypogonadism male     Obesity     Thyroid nodule     neg bx 2/2011     Past Surgical History:   Procedure Laterality Date    EYE SURGERY Left     cataract     FRACTURE SURGERY  1970    left great toe. bunion repair/shaved bone     Family History   Problem Relation Age of Onset    Stroke Father     Heart disease Father     No Known Problems Mother     No Known Problems Brother     No Known Problems Daughter     No Known Problems Daughter     No Known Problems Daughter     Blindness Neg Hx     Cataracts Neg Hx     Diabetes Neg Hx     Glaucoma Neg Hx     Hypertension Neg Hx     Macular degeneration Neg Hx     Retinal detachment Neg Hx     Strabismus Neg Hx     Thyroid disease Neg Hx     Thyroid cancer Neg Hx      Social History     Tobacco Use    Smoking status: Never Smoker     Smokeless tobacco: Never Used   Substance Use Topics    Alcohol use: No    Drug use: No     Review of Systems   Constitutional: Negative for fever.   HENT: Negative for sore throat.    Respiratory: Negative for shortness of breath.    Cardiovascular: Negative for chest pain.   Gastrointestinal: Positive for blood in stool. Negative for abdominal pain, nausea and rectal pain.   Genitourinary: Negative for dysuria.   Musculoskeletal: Negative for back pain.   Skin: Negative for rash.   Neurological: Negative for weakness.   Hematological: Does not bruise/bleed easily.   All other systems reviewed and are negative.      Physical Exam     Initial Vitals [09/11/20 1029]   BP Pulse Resp Temp SpO2   (!) 135/98 77 15 97.8 °F (36.6 °C) 98 %      MAP       --         Physical Exam    Nursing note and vitals reviewed.  Constitutional: He appears well-developed and well-nourished. No distress.   HENT:   Head: Normocephalic and atraumatic.   Mouth/Throat: Oropharynx is clear and moist.   Eyes: Conjunctivae are normal.   Neck: Normal range of motion.   Cardiovascular: Normal rate.   Pulmonary/Chest: No respiratory distress.   Abdominal: Soft. He exhibits no distension. There is no abdominal tenderness.   Genitourinary: Rectum:      Guaiac result positive.   Guaiac positive stool. : Acceptable.   Genitourinary Comments: External hemorrhoid. No signs of external bleeding. Brown stool no tenderness.      Musculoskeletal: Normal range of motion.   Neurological: He is alert and oriented to person, place, and time.   Skin: Skin is warm and dry.         ED Course   Procedures  Labs Reviewed   ISTAT PROCEDURE          Imaging Results    None          Medical Decision Making:   History:   Old Medical Records: I decided to obtain old medical records.  Initial Assessment:   Emergent evaluation of rectal bleeding  Differential Diagnosis:   Hemorrhoidal bleeding, anal fissure, GI bleeding  Clinical Tests:   Lab Tests:  Ordered and Reviewed  ED Management:  On exam he does appear to have an external hemorrhoid, but there is no sign of bleeding or bright red blood.  His stool is brown.  It is occult positive.  I checked his hematocrit and it is stable.  He is hemodynamically stable he has no tenderness.  Do not see indication for further emergent workup at this time.  As he has not have an active bleeding, I recommend that he follow up with Colorectal surgery for further management.  Return precautions advised.            Scribe Attestation:   Scribe #1: I performed the above scribed service and the documentation accurately describes the services I performed. I attest to the accuracy of the note.                      Clinical Impression:       ICD-10-CM ICD-9-CM   1. Rectal bleeding  K62.5 569.3         Disposition:   Disposition: Discharged  Condition: Stable     ED Disposition Condition    Discharge Stable        ED Prescriptions     None        Follow-up Information     Follow up With Specialties Details Why Contact Info Additional Information    Julia Fonseca MD Internal Medicine Schedule an appointment as soon as possible for a visit  For re-evaluation of your symptoms [address]  [place] [address]  [phone]       [facility] Colon and Rectal Surgery  Colon and Rectal Surgery Clinic, a referral has been sent. call to schedule an appointment [address] [facility] [address] [phone] [facility] 4th Floor Please park in [facility] and use [facility] elevator                                       Massimo Ramirez MD  09/11/20 4806

## 2020-09-11 NOTE — TELEPHONE ENCOUNTER
Pt with rectal bleeding. Per protocol, advised to go to ER now. Pt will follow dispo. No further questions. Advised he could call back with any questions or worsening of symptoms.    Reason for Disposition   MODERATE rectal bleeding (small blood clots, passing blood without stool, or toilet water turns red) more than once a day    Additional Information   Negative: Passed out (i.e., fainted, collapsed and was not responding)   Negative: Shock suspected (e.g., cold/pale/clammy skin, too weak to stand, low BP, rapid pulse)   Negative: Vomiting red blood or black (coffee ground) material   Negative: Sounds like a life-threatening emergency to the triager   Negative: SEVERE rectal bleeding (large blood clots; on and off, or constant bleeding)   Negative: SEVERE dizziness (e.g., unable to stand, requires support to walk, feels like passing out now)    Protocols used: RECTAL BLEEDING-A-[place]

## 2020-09-11 NOTE — ED TRIAGE NOTES
"Patient reports three episodes of "bloody" bowel movements since Wednesday. Patient reports the "amount of blood is a little more" with each bowel movement. Patient reports bleeding is painless. Denies chest pain, shortness of breath, abdominal pain, fevers, NVD, or dysuria. Patient denies taking anticoagulants.   "

## 2020-09-15 ENCOUNTER — OFFICE VISIT (OUTPATIENT)
Dept: SURGERY | Facility: CLINIC | Age: 75
End: 2020-09-15
Payer: MEDICARE

## 2020-09-15 VITALS
SYSTOLIC BLOOD PRESSURE: 126 MMHG | HEIGHT: 68 IN | DIASTOLIC BLOOD PRESSURE: 86 MMHG | BODY MASS INDEX: 34.59 KG/M2 | WEIGHT: 228.19 LBS | HEART RATE: 74 BPM

## 2020-09-15 DIAGNOSIS — K64.8 HEMORRHOIDS, INTERNAL, WITH BLEEDING: ICD-10-CM

## 2020-09-15 PROCEDURE — 3074F PR MOST RECENT SYSTOLIC BLOOD PRESSURE < 130 MM HG: ICD-10-PCS | Mod: HCNC,CPTII,S$GLB, | Performed by: NURSE PRACTITIONER

## 2020-09-15 PROCEDURE — 46600 DIAGNOSTIC ANOSCOPY SPX: CPT | Mod: HCNC,S$GLB,, | Performed by: NURSE PRACTITIONER

## 2020-09-15 PROCEDURE — 3079F PR MOST RECENT DIASTOLIC BLOOD PRESSURE 80-89 MM HG: ICD-10-PCS | Mod: HCNC,CPTII,S$GLB, | Performed by: NURSE PRACTITIONER

## 2020-09-15 PROCEDURE — 46600 PR DIAG2STIC A2SCOPY: ICD-10-PCS | Mod: HCNC,S$GLB,, | Performed by: NURSE PRACTITIONER

## 2020-09-15 PROCEDURE — 1159F PR MEDICATION LIST DOCUMENTED IN MEDICAL RECORD: ICD-10-PCS | Mod: HCNC,S$GLB,, | Performed by: NURSE PRACTITIONER

## 2020-09-15 PROCEDURE — 99203 PR OFFICE/OUTPT VISIT, NEW, LEVL III, 30-44 MIN: ICD-10-PCS | Mod: 25,HCNC,S$GLB, | Performed by: NURSE PRACTITIONER

## 2020-09-15 PROCEDURE — 3008F BODY MASS INDEX DOCD: CPT | Mod: HCNC,CPTII,S$GLB, | Performed by: NURSE PRACTITIONER

## 2020-09-15 PROCEDURE — 1101F PT FALLS ASSESS-DOCD LE1/YR: CPT | Mod: HCNC,CPTII,S$GLB, | Performed by: NURSE PRACTITIONER

## 2020-09-15 PROCEDURE — 1126F PR PAIN SEVERITY QUANTIFIED, NO PAIN PRESENT: ICD-10-PCS | Mod: HCNC,S$GLB,, | Performed by: NURSE PRACTITIONER

## 2020-09-15 PROCEDURE — 3074F SYST BP LT 130 MM HG: CPT | Mod: HCNC,CPTII,S$GLB, | Performed by: NURSE PRACTITIONER

## 2020-09-15 PROCEDURE — 3008F PR BODY MASS INDEX (BMI) DOCUMENTED: ICD-10-PCS | Mod: HCNC,CPTII,S$GLB, | Performed by: NURSE PRACTITIONER

## 2020-09-15 PROCEDURE — 1126F AMNT PAIN NOTED NONE PRSNT: CPT | Mod: HCNC,S$GLB,, | Performed by: NURSE PRACTITIONER

## 2020-09-15 PROCEDURE — 99203 OFFICE O/P NEW LOW 30 MIN: CPT | Mod: 25,HCNC,S$GLB, | Performed by: NURSE PRACTITIONER

## 2020-09-15 PROCEDURE — 1101F PR PT FALLS ASSESS DOC 0-1 FALLS W/OUT INJ PAST YR: ICD-10-PCS | Mod: HCNC,CPTII,S$GLB, | Performed by: NURSE PRACTITIONER

## 2020-09-15 PROCEDURE — 1159F MED LIST DOCD IN RCRD: CPT | Mod: HCNC,S$GLB,, | Performed by: NURSE PRACTITIONER

## 2020-09-15 PROCEDURE — 99999 PR PBB SHADOW E&M-EST. PATIENT-LVL III: ICD-10-PCS | Mod: PBBFAC,HCNC,, | Performed by: NURSE PRACTITIONER

## 2020-09-15 PROCEDURE — 3079F DIAST BP 80-89 MM HG: CPT | Mod: HCNC,CPTII,S$GLB, | Performed by: NURSE PRACTITIONER

## 2020-09-15 PROCEDURE — 99999 PR PBB SHADOW E&M-EST. PATIENT-LVL III: CPT | Mod: PBBFAC,HCNC,, | Performed by: NURSE PRACTITIONER

## 2020-09-15 NOTE — PROGRESS NOTES
Patient ID:  Geovani Evans Jr. is a 74 y.o. male     Chief Complaint: No chief complaint on file.       HPI:  Geovani Evans Jr. presents to colon and rectal surgery with a complaint of rectal bleeding. He presented to the ER 9/11 for three episodes of rectal bleeding. hemotocrit checked and was 37, no cbc. Since discharge, he has not had any reoccurrence of bleeding. He denies rectal pain, abdominal pain, change in bowel habits or unexplained wieght loss. BM daily or every other day. Typically soft, formed. Denies SOB, CP, dizziness. He is not on any anticoagulation    No family hx of CRC  Colonoscopy 2011  - Normal      ROS:     Review of Systems   Constitutional: Negative for appetite change, chills, fatigue, fever and unexpected weight change.   Respiratory: Negative for shortness of breath.    Cardiovascular: Negative for chest pain.   Gastrointestinal: Positive for blood in stool. Negative for abdominal distention, abdominal pain, anal bleeding, constipation, diarrhea, nausea, rectal pain and vomiting.       Review of patient's allergies indicates:  No Known Allergies  Past Medical History:   Diagnosis Date    Anemia     Hypertension     Hypogonadism male     Obesity     Thyroid nodule     neg bx 2/2011     Past Surgical History:   Procedure Laterality Date    EYE SURGERY Left     cataract     FRACTURE SURGERY  1970    left great toe. bunion repair/shaved bone     Family History   Problem Relation Age of Onset    Stroke Father     Heart disease Father     No Known Problems Mother     No Known Problems Brother     No Known Problems Daughter     No Known Problems Daughter     No Known Problems Daughter     Blindness Neg Hx     Cataracts Neg Hx     Diabetes Neg Hx     Glaucoma Neg Hx     Hypertension Neg Hx     Macular degeneration Neg Hx     Retinal detachment Neg Hx     Strabismus Neg Hx     Thyroid disease Neg Hx     Thyroid cancer Neg Hx      Current Outpatient Medications on File  Prior to Visit   Medication Sig    lisinopriL-hydrochlorothiazide (PRINZIDE,ZESTORETIC) 10-12.5 mg per tablet Take 1 tablet by mouth once daily.    pravastatin (PRAVACHOL) 40 MG tablet Take 1 tablet (40 mg total) by mouth once daily.    tamsulosin (FLOMAX) 0.4 mg Cap Take 1 capsule (0.4 mg total) by mouth every evening. (Patient taking differently: Take 0.4 mg by mouth every other day. )     No current facility-administered medications on file prior to visit.        PE:  Vitals:    09/15/20 0911   BP: 126/86   Pulse: 74     Physical Exam   Constitutional: He is oriented to person, place, and time and well-developed, well-nourished, and in no distress. No distress.   HENT:   Head: Normocephalic and atraumatic.   Pulmonary/Chest: Effort normal. No respiratory distress.   Abdominal: Soft. He exhibits no distension. There is no abdominal tenderness.   Genitourinary:    Genitourinary Comments: Normal perianal skin. eversion of anus revealed no abnormality or fissure, SYLVESTER revealed no masses or blood. stool in vault, normal sphincter tone, anoscopy revealed normal grade II hemorrhoids, inflamed left lateral     Musculoskeletal: Normal range of motion.   Neurological: He is alert and oriented to person, place, and time. GCS score is 15.   Skin: Skin is warm and dry. No rash noted. No erythema.   Psychiatric: Affect normal.       Impression:  Encounter Diagnosis   Name Primary?    Hemorrhoids, internal, with bleeding        PLAN:  Diagnoses and all orders for this visit:    Hemorrhoids, internal, with bleeding  -     Ambulatory referral/consult to Colorectal Surgery    High fiber diet  Fiber supplement  Avoid straining or sitting on the toilet for prolonged periods of time  RTC 1 month - if bleeding returns, will get early cscope. If not colonoscopy next year.

## 2020-09-15 NOTE — LETTER
September 15, 2020      Massimo Ramirez MD  [address]  [facility] [address]           [facility] 4[facility] [address]  [address]  [facility] [address]  Phone: [phone]          Patient: Geovani Evans Jr.   MR Number: [id number]   YOB: 1945   Date of Visit: 9/15/2020       Dear Dr. Massimo Ramirez:    Thank you for referring Geovani Evans to me for evaluation. Attached you will find relevant portions of my assessment and plan of care.    If you have questions, please do not hesitate to call me. I look forward to following Geovani Evans along with you.    Sincerely,    Jaclyn Mena, STEVE    Enclosure  CC:  No Recipients    If you would like to receive this communication electronically, please contact [email or web address] or [phone] to request more information on [employer] access.    For providers and/or their staff who would like to refer a patient to [facility], please contact us through our one-stop-shop provider referral line, [facility], at [phone].    If you feel you have received this communication in error or would no longer like to receive these types of communications, please e-mail [email or web address]

## 2020-09-25 ENCOUNTER — OFFICE VISIT (OUTPATIENT)
Dept: UROLOGY | Facility: CLINIC | Age: 75
End: 2020-09-25
Payer: MEDICARE

## 2020-09-25 VITALS
WEIGHT: 230.63 LBS | HEART RATE: 72 BPM | HEIGHT: 68 IN | SYSTOLIC BLOOD PRESSURE: 124 MMHG | DIASTOLIC BLOOD PRESSURE: 77 MMHG | BODY MASS INDEX: 34.95 KG/M2

## 2020-09-25 DIAGNOSIS — N13.8 BPH WITH URINARY OBSTRUCTION: Primary | ICD-10-CM

## 2020-09-25 DIAGNOSIS — N40.1 BPH WITH URINARY OBSTRUCTION: Primary | ICD-10-CM

## 2020-09-25 LAB
BILIRUB SERPL-MCNC: NORMAL MG/DL
BLOOD URINE, POC: NORMAL
CLARITY, POC UA: CLEAR
COLOR, POC UA: YELLOW
GLUCOSE UR QL STRIP: NORMAL
KETONES UR QL STRIP: NORMAL
LEUKOCYTE ESTERASE URINE, POC: NORMAL
NITRITE, POC UA: NORMAL
PH, POC UA: 5
POC RESIDUAL URINE VOLUME: 12 ML (ref 0–100)
PROTEIN, POC: NORMAL
SPECIFIC GRAVITY, POC UA: 1.02
UROBILINOGEN, POC UA: NORMAL

## 2020-09-25 PROCEDURE — 1126F AMNT PAIN NOTED NONE PRSNT: CPT | Mod: HCNC,S$GLB,, | Performed by: NURSE PRACTITIONER

## 2020-09-25 PROCEDURE — 3008F BODY MASS INDEX DOCD: CPT | Mod: HCNC,CPTII,S$GLB, | Performed by: NURSE PRACTITIONER

## 2020-09-25 PROCEDURE — 1101F PR PT FALLS ASSESS DOC 0-1 FALLS W/OUT INJ PAST YR: ICD-10-PCS | Mod: HCNC,CPTII,S$GLB, | Performed by: NURSE PRACTITIONER

## 2020-09-25 PROCEDURE — 99214 OFFICE O/P EST MOD 30 MIN: CPT | Mod: HCNC,25,S$GLB, | Performed by: NURSE PRACTITIONER

## 2020-09-25 PROCEDURE — 3074F SYST BP LT 130 MM HG: CPT | Mod: HCNC,CPTII,S$GLB, | Performed by: NURSE PRACTITIONER

## 2020-09-25 PROCEDURE — 3074F PR MOST RECENT SYSTOLIC BLOOD PRESSURE < 130 MM HG: ICD-10-PCS | Mod: HCNC,CPTII,S$GLB, | Performed by: NURSE PRACTITIONER

## 2020-09-25 PROCEDURE — 3078F DIAST BP <80 MM HG: CPT | Mod: HCNC,CPTII,S$GLB, | Performed by: NURSE PRACTITIONER

## 2020-09-25 PROCEDURE — 99999 PR PBB SHADOW E&M-EST. PATIENT-LVL III: ICD-10-PCS | Mod: PBBFAC,HCNC,, | Performed by: NURSE PRACTITIONER

## 2020-09-25 PROCEDURE — 99214 PR OFFICE/OUTPT VISIT, EST, LEVL IV, 30-39 MIN: ICD-10-PCS | Mod: HCNC,25,S$GLB, | Performed by: NURSE PRACTITIONER

## 2020-09-25 PROCEDURE — 51798 US URINE CAPACITY MEASURE: CPT | Mod: HCNC,S$GLB,, | Performed by: NURSE PRACTITIONER

## 2020-09-25 PROCEDURE — 81002 URINALYSIS NONAUTO W/O SCOPE: CPT | Mod: HCNC,S$GLB,, | Performed by: NURSE PRACTITIONER

## 2020-09-25 PROCEDURE — 1101F PT FALLS ASSESS-DOCD LE1/YR: CPT | Mod: HCNC,CPTII,S$GLB, | Performed by: NURSE PRACTITIONER

## 2020-09-25 PROCEDURE — 81002 POCT URINE DIPSTICK WITHOUT MICROSCOPE: ICD-10-PCS | Mod: HCNC,S$GLB,, | Performed by: NURSE PRACTITIONER

## 2020-09-25 PROCEDURE — 3008F PR BODY MASS INDEX (BMI) DOCUMENTED: ICD-10-PCS | Mod: HCNC,CPTII,S$GLB, | Performed by: NURSE PRACTITIONER

## 2020-09-25 PROCEDURE — 1126F PR PAIN SEVERITY QUANTIFIED, NO PAIN PRESENT: ICD-10-PCS | Mod: HCNC,S$GLB,, | Performed by: NURSE PRACTITIONER

## 2020-09-25 PROCEDURE — 1159F MED LIST DOCD IN RCRD: CPT | Mod: HCNC,S$GLB,, | Performed by: NURSE PRACTITIONER

## 2020-09-25 PROCEDURE — 51798 POCT BLADDER SCAN: ICD-10-PCS | Mod: HCNC,S$GLB,, | Performed by: NURSE PRACTITIONER

## 2020-09-25 PROCEDURE — 1159F PR MEDICATION LIST DOCUMENTED IN MEDICAL RECORD: ICD-10-PCS | Mod: HCNC,S$GLB,, | Performed by: NURSE PRACTITIONER

## 2020-09-25 PROCEDURE — 3078F PR MOST RECENT DIASTOLIC BLOOD PRESSURE < 80 MM HG: ICD-10-PCS | Mod: HCNC,CPTII,S$GLB, | Performed by: NURSE PRACTITIONER

## 2020-09-25 PROCEDURE — 99999 PR PBB SHADOW E&M-EST. PATIENT-LVL III: CPT | Mod: PBBFAC,HCNC,, | Performed by: NURSE PRACTITIONER

## 2020-09-25 NOTE — PROGRESS NOTES
CHIEF COMPLAINT:  Geovani Evans Jr. is a 74 y.o. male presents for f/u visit for BPH    HISTORY OF PRESENTING ILLINESS:    Geovani Evans Jr. is a 74 y.o. male who was initially seen 06/25/2020 for some LUTS; urinary frequency and some urge incontinence.  Has low T, but ok with it.  He reports that developed breast while on HRT; so he stopped  ED >1 year  He drinks coffee; tea at night; sometimes water    He started Flomax at that visit.  Today he states that everything is great and improved.  He voices no complaints.      REVIEW OF SYSTEMS:  Review of Systems   Constitutional: Negative.  Negative for chills, diaphoresis and fever.   HENT: Negative for congestion and sore throat.    Eyes: Negative.  Negative for double vision.   Respiratory: Negative.  Negative for cough, shortness of breath and wheezing.    Cardiovascular: Negative.  Negative for chest pain, palpitations and leg swelling.   Gastrointestinal: Negative.  Negative for abdominal pain, blood in stool, constipation, diarrhea, nausea and vomiting.   Genitourinary: Negative.  Negative for dysuria, flank pain and hematuria.        Ok with urination     Musculoskeletal: Negative.  Negative for back pain, falls and neck pain.   Skin: Negative.  Negative for rash.   Neurological: Negative.  Negative for dizziness and seizures.   Endo/Heme/Allergies: Does not bruise/bleed easily.   Psychiatric/Behavioral: Negative.  Negative for depression. The patient is not nervous/anxious.          PATIENT HISTORY:    Past Medical History:   Diagnosis Date    Anemia     Hypertension     Hypogonadism male     Obesity     Thyroid nodule     neg bx 2/2011       Past Surgical History:   Procedure Laterality Date    EYE SURGERY Left     cataract     FRACTURE SURGERY  1970    left great toe. bunion repair/shaved bone       Family History   Problem Relation Age of Onset    Stroke Father     Heart disease Father     No Known Problems Mother     No Known Problems Brother      No Known Problems Daughter     No Known Problems Daughter     No Known Problems Daughter     Blindness Neg Hx     Cataracts Neg Hx     Diabetes Neg Hx     Glaucoma Neg Hx     Hypertension Neg Hx     Macular degeneration Neg Hx     Retinal detachment Neg Hx     Strabismus Neg Hx     Thyroid disease Neg Hx     Thyroid cancer Neg Hx        Social History     Socioeconomic History    Marital status:      Spouse name: Not on file    Number of children: Not on file    Years of education: Not on file    Highest education level: Not on file   Occupational History    Occupation:      Comment: shell    Occupation: [employer]     Comment: nelly   Social Needs    Financial resource strain: Not on file    Food insecurity     Worry: Not on file     Inability: Not on file    Transportation needs     Medical: Not on file     Non-medical: Not on file   Tobacco Use    Smoking status: Never Smoker    Smokeless tobacco: Never Used   Substance and Sexual Activity    Alcohol use: No    Drug use: No    Sexual activity: Not Currently   Lifestyle    Physical activity     Days per week: Not on file     Minutes per session: Not on file    Stress: Not on file   Relationships    Social connections     Talks on phone: Not on file     Gets together: Not on file     Attends Sabianism service: Not on file     Active member of club or organization: Not on file     Attends meetings of clubs or organizations: Not on file     Relationship status: Not on file   Other Topics Concern    Not on file   Social History Narrative    Water aerobics at [place] 3 days a week.    Retired.    To celebrate 50th wedding celebration.       Allergies:  Patient has no known allergies.    Medications:    Current Outpatient Medications:     lisinopriL-hydrochlorothiazide (PRINZIDE,ZESTORETIC) 10-12.5 mg per tablet, Take 1 tablet by mouth once daily., Disp: 90 tablet, Rfl: 3    pravastatin (PRAVACHOL) 40 MG tablet,  Take 1 tablet (40 mg total) by mouth once daily., Disp: 90 tablet, Rfl: 3    tamsulosin (FLOMAX) 0.4 mg Cap, Take 1 capsule (0.4 mg total) by mouth every evening. (Patient taking differently: Take 0.4 mg by mouth every other day. ), Disp: 90 capsule, Rfl: 3    PHYSICAL EXAMINATION:  Physical Exam  Vitals signs and nursing note reviewed.   Constitutional:       Appearance: Normal appearance. He is normal weight.   HENT:      Head: Normocephalic.      Right Ear: External ear normal.      Left Ear: External ear normal.      Nose: Nose normal.   Eyes:      Conjunctiva/sclera: Conjunctivae normal.   Neck:      Musculoskeletal: Normal range of motion.   Cardiovascular:      Rate and Rhythm: Normal rate.   Pulmonary:      Effort: Pulmonary effort is normal. No respiratory distress.   Abdominal:      General: There is no distension.      Tenderness: There is no abdominal tenderness. There is no right CVA tenderness, left CVA tenderness or rebound.   Genitourinary:     Penis: Normal.       Scrotum/Testes: Normal.      Comments: SYLVESTER done last visit    Musculoskeletal: Normal range of motion.   Skin:     General: Skin is warm and dry.   Neurological:      General: No focal deficit present.      Mental Status: He is alert and oriented to person, place, and time.   Psychiatric:         Mood and Affect: Mood normal.           LABS:      In office UA today was clear today.  His PVR in the office today by the nurse was 13.        Lab Results   Component Value Date    PSA 1.7 06/16/2020    PSA 3.5 10/12/2017    PSA 1.9 01/10/2015             IMPRESSION:    Encounter Diagnoses   Name Primary?    BPH with urinary obstruction Yes         Assessment:       1. BPH with urinary obstruction        Plan:         I spent 25 minutes with the patient of which more than half was spent in direct consultation with the patient in regards to our treatment and plan.    Education and recommendations of today's plan of care including home  remedies.  We discussed his LUTS; again expectations with medication flomax  Diet and exercise  RTC one year!  PSA if not done by PCP

## 2020-09-29 ENCOUNTER — PATIENT MESSAGE (OUTPATIENT)
Dept: OTHER | Facility: OTHER | Age: 75
End: 2020-09-29

## 2020-10-12 ENCOUNTER — TELEPHONE (OUTPATIENT)
Dept: SURGERY | Facility: CLINIC | Age: 75
End: 2020-10-12

## 2020-12-11 ENCOUNTER — PATIENT MESSAGE (OUTPATIENT)
Dept: OTHER | Facility: OTHER | Age: 75
End: 2020-12-11

## 2021-05-03 ENCOUNTER — PES CALL (OUTPATIENT)
Dept: ADMINISTRATIVE | Facility: CLINIC | Age: 76
End: 2021-05-03

## 2021-06-29 ENCOUNTER — PES CALL (OUTPATIENT)
Dept: ADMINISTRATIVE | Facility: CLINIC | Age: 76
End: 2021-06-29

## 2021-06-30 ENCOUNTER — OFFICE VISIT (OUTPATIENT)
Dept: INTERNAL MEDICINE | Facility: CLINIC | Age: 76
End: 2021-06-30
Payer: MEDICARE

## 2021-06-30 VITALS
SYSTOLIC BLOOD PRESSURE: 120 MMHG | HEART RATE: 63 BPM | OXYGEN SATURATION: 98 % | DIASTOLIC BLOOD PRESSURE: 60 MMHG | BODY MASS INDEX: 34.78 KG/M2 | WEIGHT: 228.75 LBS

## 2021-06-30 DIAGNOSIS — E66.9 OBESITY, CLASS I, BMI 30-34.9: ICD-10-CM

## 2021-06-30 DIAGNOSIS — E78.2 MIXED HYPERLIPIDEMIA: ICD-10-CM

## 2021-06-30 DIAGNOSIS — N40.0 BENIGN PROSTATIC HYPERPLASIA, UNSPECIFIED WHETHER LOWER URINARY TRACT SYMPTOMS PRESENT: ICD-10-CM

## 2021-06-30 DIAGNOSIS — I10 ESSENTIAL HYPERTENSION: ICD-10-CM

## 2021-06-30 DIAGNOSIS — E04.2 MULTINODULAR GOITER (NONTOXIC): ICD-10-CM

## 2021-06-30 DIAGNOSIS — Z00.00 ENCOUNTER FOR PREVENTIVE HEALTH EXAMINATION: Primary | ICD-10-CM

## 2021-06-30 DIAGNOSIS — K90.49 CARBOHYDRATE INTOLERANCE: ICD-10-CM

## 2021-06-30 DIAGNOSIS — D64.9 ANEMIA, UNSPECIFIED TYPE: ICD-10-CM

## 2021-06-30 PROCEDURE — 1126F AMNT PAIN NOTED NONE PRSNT: CPT | Mod: S$GLB,,, | Performed by: NURSE PRACTITIONER

## 2021-06-30 PROCEDURE — 1158F ADVNC CARE PLAN TLK DOCD: CPT | Mod: S$GLB,,, | Performed by: NURSE PRACTITIONER

## 2021-06-30 PROCEDURE — G0439 PPPS, SUBSEQ VISIT: HCPCS | Mod: S$GLB,,, | Performed by: NURSE PRACTITIONER

## 2021-06-30 PROCEDURE — 3008F PR BODY MASS INDEX (BMI) DOCUMENTED: ICD-10-PCS | Mod: CPTII,S$GLB,, | Performed by: NURSE PRACTITIONER

## 2021-06-30 PROCEDURE — 99499 UNLISTED E&M SERVICE: CPT | Mod: S$GLB,,, | Performed by: NURSE PRACTITIONER

## 2021-06-30 PROCEDURE — 1126F PR PAIN SEVERITY QUANTIFIED, NO PAIN PRESENT: ICD-10-PCS | Mod: S$GLB,,, | Performed by: NURSE PRACTITIONER

## 2021-06-30 PROCEDURE — 99499 RISK ADDL DX/OHS AUDIT: ICD-10-PCS | Mod: S$GLB,,, | Performed by: NURSE PRACTITIONER

## 2021-06-30 PROCEDURE — 1101F PR PT FALLS ASSESS DOC 0-1 FALLS W/OUT INJ PAST YR: ICD-10-PCS | Mod: CPTII,S$GLB,, | Performed by: NURSE PRACTITIONER

## 2021-06-30 PROCEDURE — 3288F PR FALLS RISK ASSESSMENT DOCUMENTED: ICD-10-PCS | Mod: CPTII,S$GLB,, | Performed by: NURSE PRACTITIONER

## 2021-06-30 PROCEDURE — 1158F PR ADVANCE CARE PLANNING DISCUSS DOCUMENTED IN MEDICAL RECORD: ICD-10-PCS | Mod: S$GLB,,, | Performed by: NURSE PRACTITIONER

## 2021-06-30 PROCEDURE — 3288F FALL RISK ASSESSMENT DOCD: CPT | Mod: CPTII,S$GLB,, | Performed by: NURSE PRACTITIONER

## 2021-06-30 PROCEDURE — 99999 PR PBB SHADOW E&M-EST. PATIENT-LVL III: ICD-10-PCS | Mod: PBBFAC,,, | Performed by: NURSE PRACTITIONER

## 2021-06-30 PROCEDURE — G0439 PR MEDICARE ANNUAL WELLNESS SUBSEQUENT VISIT: ICD-10-PCS | Mod: S$GLB,,, | Performed by: NURSE PRACTITIONER

## 2021-06-30 PROCEDURE — 99999 PR PBB SHADOW E&M-EST. PATIENT-LVL III: CPT | Mod: PBBFAC,,, | Performed by: NURSE PRACTITIONER

## 2021-06-30 PROCEDURE — 1101F PT FALLS ASSESS-DOCD LE1/YR: CPT | Mod: CPTII,S$GLB,, | Performed by: NURSE PRACTITIONER

## 2021-06-30 PROCEDURE — 3008F BODY MASS INDEX DOCD: CPT | Mod: CPTII,S$GLB,, | Performed by: NURSE PRACTITIONER

## 2021-07-01 ENCOUNTER — OFFICE VISIT (OUTPATIENT)
Dept: INTERNAL MEDICINE | Facility: CLINIC | Age: 76
End: 2021-07-01
Payer: MEDICARE

## 2021-07-01 VITALS
BODY MASS INDEX: 33.92 KG/M2 | HEART RATE: 78 BPM | SYSTOLIC BLOOD PRESSURE: 118 MMHG | OXYGEN SATURATION: 96 % | WEIGHT: 229 LBS | DIASTOLIC BLOOD PRESSURE: 76 MMHG | HEIGHT: 69 IN

## 2021-07-01 DIAGNOSIS — N40.0 BENIGN PROSTATIC HYPERPLASIA, UNSPECIFIED WHETHER LOWER URINARY TRACT SYMPTOMS PRESENT: ICD-10-CM

## 2021-07-01 DIAGNOSIS — I10 ESSENTIAL HYPERTENSION: ICD-10-CM

## 2021-07-01 DIAGNOSIS — D64.9 ANEMIA, UNSPECIFIED TYPE: ICD-10-CM

## 2021-07-01 DIAGNOSIS — Z12.11 COLON CANCER SCREENING: ICD-10-CM

## 2021-07-01 DIAGNOSIS — E78.2 MIXED HYPERLIPIDEMIA: ICD-10-CM

## 2021-07-01 DIAGNOSIS — Z00.00 ANNUAL PHYSICAL EXAM: Primary | ICD-10-CM

## 2021-07-01 DIAGNOSIS — E29.1 MALE HYPOGONADISM: ICD-10-CM

## 2021-07-01 PROCEDURE — 3008F PR BODY MASS INDEX (BMI) DOCUMENTED: ICD-10-PCS | Mod: CPTII,S$GLB,, | Performed by: INTERNAL MEDICINE

## 2021-07-01 PROCEDURE — 3288F PR FALLS RISK ASSESSMENT DOCUMENTED: ICD-10-PCS | Mod: CPTII,S$GLB,, | Performed by: INTERNAL MEDICINE

## 2021-07-01 PROCEDURE — 99999 PR PBB SHADOW E&M-EST. PATIENT-LVL III: ICD-10-PCS | Mod: PBBFAC,,, | Performed by: INTERNAL MEDICINE

## 2021-07-01 PROCEDURE — 1101F PR PT FALLS ASSESS DOC 0-1 FALLS W/OUT INJ PAST YR: ICD-10-PCS | Mod: CPTII,S$GLB,, | Performed by: INTERNAL MEDICINE

## 2021-07-01 PROCEDURE — 99499 UNLISTED E&M SERVICE: CPT | Mod: S$GLB,,, | Performed by: INTERNAL MEDICINE

## 2021-07-01 PROCEDURE — 99397 PER PM REEVAL EST PAT 65+ YR: CPT | Mod: S$GLB,,, | Performed by: INTERNAL MEDICINE

## 2021-07-01 PROCEDURE — 1126F AMNT PAIN NOTED NONE PRSNT: CPT | Mod: S$GLB,,, | Performed by: INTERNAL MEDICINE

## 2021-07-01 PROCEDURE — 3288F FALL RISK ASSESSMENT DOCD: CPT | Mod: CPTII,S$GLB,, | Performed by: INTERNAL MEDICINE

## 2021-07-01 PROCEDURE — 1101F PT FALLS ASSESS-DOCD LE1/YR: CPT | Mod: CPTII,S$GLB,, | Performed by: INTERNAL MEDICINE

## 2021-07-01 PROCEDURE — 99999 PR PBB SHADOW E&M-EST. PATIENT-LVL III: CPT | Mod: PBBFAC,,, | Performed by: INTERNAL MEDICINE

## 2021-07-01 PROCEDURE — 99397 PR PREVENTIVE VISIT,EST,65 & OVER: ICD-10-PCS | Mod: S$GLB,,, | Performed by: INTERNAL MEDICINE

## 2021-07-01 PROCEDURE — 1126F PR PAIN SEVERITY QUANTIFIED, NO PAIN PRESENT: ICD-10-PCS | Mod: S$GLB,,, | Performed by: INTERNAL MEDICINE

## 2021-07-01 PROCEDURE — 3008F BODY MASS INDEX DOCD: CPT | Mod: CPTII,S$GLB,, | Performed by: INTERNAL MEDICINE

## 2021-07-01 PROCEDURE — 99499 RISK ADDL DX/OHS AUDIT: ICD-10-PCS | Mod: S$GLB,,, | Performed by: INTERNAL MEDICINE

## 2021-07-01 RX ORDER — LISINOPRIL AND HYDROCHLOROTHIAZIDE 10; 12.5 MG/1; MG/1
1 TABLET ORAL DAILY
Qty: 90 TABLET | Refills: 3 | Status: SHIPPED | OUTPATIENT
Start: 2021-07-01 | End: 2022-10-13 | Stop reason: SDUPTHER

## 2021-07-01 RX ORDER — PRAVASTATIN SODIUM 40 MG/1
40 TABLET ORAL DAILY
Qty: 90 TABLET | Refills: 3 | Status: SHIPPED | OUTPATIENT
Start: 2021-07-01 | End: 2022-10-13 | Stop reason: SDUPTHER

## 2021-07-02 ENCOUNTER — LAB VISIT (OUTPATIENT)
Dept: LAB | Facility: HOSPITAL | Age: 76
End: 2021-07-02
Attending: INTERNAL MEDICINE
Payer: MEDICARE

## 2021-07-02 DIAGNOSIS — E78.2 MIXED HYPERLIPIDEMIA: ICD-10-CM

## 2021-07-02 LAB
CHOLEST SERPL-MCNC: 161 MG/DL (ref 120–199)
CHOLEST/HDLC SERPL: 4 {RATIO} (ref 2–5)
HDLC SERPL-MCNC: 40 MG/DL (ref 40–75)
HDLC SERPL: 24.8 % (ref 20–50)
LDLC SERPL CALC-MCNC: 81.8 MG/DL (ref 63–159)
NONHDLC SERPL-MCNC: 121 MG/DL
TRIGL SERPL-MCNC: 196 MG/DL (ref 30–150)

## 2021-07-02 PROCEDURE — 36415 COLL VENOUS BLD VENIPUNCTURE: CPT | Performed by: INTERNAL MEDICINE

## 2021-07-02 PROCEDURE — 80061 LIPID PANEL: CPT | Performed by: INTERNAL MEDICINE

## 2021-07-06 ENCOUNTER — TELEPHONE (OUTPATIENT)
Dept: INTERNAL MEDICINE | Facility: CLINIC | Age: 76
End: 2021-07-06

## 2021-12-14 ENCOUNTER — OFFICE VISIT (OUTPATIENT)
Dept: URGENT CARE | Facility: CLINIC | Age: 76
End: 2021-12-14
Payer: MEDICARE

## 2021-12-14 VITALS
HEART RATE: 72 BPM | WEIGHT: 229 LBS | SYSTOLIC BLOOD PRESSURE: 132 MMHG | TEMPERATURE: 98 F | RESPIRATION RATE: 20 BRPM | DIASTOLIC BLOOD PRESSURE: 86 MMHG | BODY MASS INDEX: 33.82 KG/M2 | OXYGEN SATURATION: 98 %

## 2021-12-14 DIAGNOSIS — M79.89 FOOT SWELLING: ICD-10-CM

## 2021-12-14 DIAGNOSIS — L03.115 CELLULITIS OF RIGHT FOOT: ICD-10-CM

## 2021-12-14 DIAGNOSIS — M79.671 ACUTE PAIN OF RIGHT FOOT: Primary | ICD-10-CM

## 2021-12-14 PROCEDURE — 73630 X-RAY EXAM OF FOOT: CPT | Mod: RT,S$GLB,, | Performed by: RADIOLOGY

## 2021-12-14 PROCEDURE — 4010F ACE/ARB THERAPY RXD/TAKEN: CPT | Mod: CPTII,S$GLB,, | Performed by: NURSE PRACTITIONER

## 2021-12-14 PROCEDURE — 99213 OFFICE O/P EST LOW 20 MIN: CPT | Mod: S$GLB,,, | Performed by: NURSE PRACTITIONER

## 2021-12-14 PROCEDURE — 73630 XR FOOT COMPLETE 3 VIEW RIGHT: ICD-10-PCS | Mod: RT,S$GLB,, | Performed by: RADIOLOGY

## 2021-12-14 PROCEDURE — 99213 PR OFFICE/OUTPT VISIT, EST, LEVL III, 20-29 MIN: ICD-10-PCS | Mod: S$GLB,,, | Performed by: NURSE PRACTITIONER

## 2021-12-14 PROCEDURE — 4010F PR ACE/ARB THEARPY RXD/TAKEN: ICD-10-PCS | Mod: CPTII,S$GLB,, | Performed by: NURSE PRACTITIONER

## 2021-12-14 RX ORDER — CEPHALEXIN 500 MG/1
500 CAPSULE ORAL 4 TIMES DAILY
Qty: 28 CAPSULE | Refills: 0 | Status: SHIPPED | OUTPATIENT
Start: 2021-12-14 | End: 2021-12-21

## 2021-12-21 ENCOUNTER — LAB VISIT (OUTPATIENT)
Dept: LAB | Facility: HOSPITAL | Age: 76
End: 2021-12-21
Payer: MEDICARE

## 2021-12-21 ENCOUNTER — OFFICE VISIT (OUTPATIENT)
Dept: INTERNAL MEDICINE | Facility: CLINIC | Age: 76
End: 2021-12-21
Payer: MEDICARE

## 2021-12-21 VITALS
SYSTOLIC BLOOD PRESSURE: 130 MMHG | OXYGEN SATURATION: 98 % | BODY MASS INDEX: 33.31 KG/M2 | WEIGHT: 224.88 LBS | HEART RATE: 90 BPM | DIASTOLIC BLOOD PRESSURE: 76 MMHG | HEIGHT: 69 IN

## 2021-12-21 DIAGNOSIS — M79.671 RIGHT FOOT PAIN: ICD-10-CM

## 2021-12-21 DIAGNOSIS — M10.9 ACUTE GOUT OF RIGHT FOOT, UNSPECIFIED CAUSE: ICD-10-CM

## 2021-12-21 DIAGNOSIS — M79.671 RIGHT FOOT PAIN: Primary | ICD-10-CM

## 2021-12-21 LAB — URATE SERPL-MCNC: 8.8 MG/DL (ref 3.4–7)

## 2021-12-21 PROCEDURE — 99999 PR PBB SHADOW E&M-EST. PATIENT-LVL III: ICD-10-PCS | Mod: PBBFAC,HCNC,, | Performed by: PHYSICIAN ASSISTANT

## 2021-12-21 PROCEDURE — 36415 COLL VENOUS BLD VENIPUNCTURE: CPT | Mod: HCNC | Performed by: PHYSICIAN ASSISTANT

## 2021-12-21 PROCEDURE — 4010F PR ACE/ARB THEARPY RXD/TAKEN: ICD-10-PCS | Mod: HCNC,CPTII,S$GLB, | Performed by: PHYSICIAN ASSISTANT

## 2021-12-21 PROCEDURE — 99213 PR OFFICE/OUTPT VISIT, EST, LEVL III, 20-29 MIN: ICD-10-PCS | Mod: HCNC,S$GLB,, | Performed by: PHYSICIAN ASSISTANT

## 2021-12-21 PROCEDURE — 99999 PR PBB SHADOW E&M-EST. PATIENT-LVL III: CPT | Mod: PBBFAC,HCNC,, | Performed by: PHYSICIAN ASSISTANT

## 2021-12-21 PROCEDURE — 84550 ASSAY OF BLOOD/URIC ACID: CPT | Mod: HCNC | Performed by: PHYSICIAN ASSISTANT

## 2021-12-21 PROCEDURE — 99213 OFFICE O/P EST LOW 20 MIN: CPT | Mod: HCNC,S$GLB,, | Performed by: PHYSICIAN ASSISTANT

## 2021-12-21 PROCEDURE — 4010F ACE/ARB THERAPY RXD/TAKEN: CPT | Mod: HCNC,CPTII,S$GLB, | Performed by: PHYSICIAN ASSISTANT

## 2021-12-21 RX ORDER — PREDNISONE 20 MG/1
TABLET ORAL
Qty: 20 TABLET | Refills: 0 | Status: SHIPPED | OUTPATIENT
Start: 2021-12-21 | End: 2022-01-24

## 2021-12-22 ENCOUNTER — TELEPHONE (OUTPATIENT)
Dept: INTERNAL MEDICINE | Facility: CLINIC | Age: 76
End: 2021-12-22
Payer: MEDICARE

## 2021-12-22 RX ORDER — ALLOPURINOL 100 MG/1
100 TABLET ORAL DAILY
Qty: 30 TABLET | Refills: 6 | Status: SHIPPED | OUTPATIENT
Start: 2021-12-22 | End: 2022-05-27 | Stop reason: SDUPTHER

## 2022-01-12 ENCOUNTER — TELEPHONE (OUTPATIENT)
Dept: INTERNAL MEDICINE | Facility: CLINIC | Age: 77
End: 2022-01-12
Payer: MEDICARE

## 2022-01-12 NOTE — TELEPHONE ENCOUNTER
----- Message from Za Baptiste sent at 1/12/2022  2:28 PM CST -----  Contact: Emma [phone]  Patient would like to get medical advice.    Comments:   Calling regarding the patient having a gout flare up from food.

## 2022-01-13 ENCOUNTER — TELEPHONE (OUTPATIENT)
Dept: INTERNAL MEDICINE | Facility: CLINIC | Age: 77
End: 2022-01-13
Payer: MEDICARE

## 2022-01-13 RX ORDER — INDOMETHACIN 50 MG/1
50 CAPSULE ORAL
Qty: 20 CAPSULE | Refills: 0 | Status: SHIPPED | OUTPATIENT
Start: 2022-01-13 | End: 2022-01-24

## 2022-01-13 NOTE — TELEPHONE ENCOUNTER
Symptoms got better with steroids which he finished about 2 weeks ago.   Had fish on last Friday, and pain came back of right foot.   Tried ibu 800mg on one day which helped  Pain worse at night. Not as much swelling as last time.   Will treast with course of indomethacin  Notify clinic if symptoms worsen or fail to improve.

## 2022-01-13 NOTE — TELEPHONE ENCOUNTER
----- Message from Melvi Packer sent at 1/13/2022  2:34 PM CST -----  Contact: [phone] or [phone]  Pt was seen on 12/21/21 for gout in both feet. He states he is still in hurting and would like to know if something could be called in for the pain. He is about to go on a cruise and is in too much pain to be able to walk around like he is going to need to be able to do.       [facility] [address] [place] [address], [place] [address]  [facility] [place] [address]  Phone: [phone] Fax: [phone]

## 2022-01-19 ENCOUNTER — PATIENT OUTREACH (OUTPATIENT)
Dept: ADMINISTRATIVE | Facility: HOSPITAL | Age: 77
End: 2022-01-19
Payer: MEDICARE

## 2022-01-19 NOTE — PROGRESS NOTES
Health Maintenance Due   Topic Date Due    Shingles Vaccine (1 of 2) Never done    Colorectal Cancer Screening  07/18/2021    Influenza Vaccine (1) 09/01/2021       HM updated.  Covid booster scanned into chart. Immunizations reconciled.       Yani Holliday LPN   Clinical Care Coordinator  Primary Care and Wellness

## 2022-01-24 ENCOUNTER — LAB VISIT (OUTPATIENT)
Dept: LAB | Facility: HOSPITAL | Age: 77
End: 2022-01-24
Attending: INTERNAL MEDICINE
Payer: MEDICARE

## 2022-01-24 ENCOUNTER — OFFICE VISIT (OUTPATIENT)
Dept: INTERNAL MEDICINE | Facility: CLINIC | Age: 77
End: 2022-01-24
Payer: MEDICARE

## 2022-01-24 VITALS
OXYGEN SATURATION: 96 % | DIASTOLIC BLOOD PRESSURE: 80 MMHG | HEIGHT: 68 IN | HEART RATE: 98 BPM | WEIGHT: 235 LBS | SYSTOLIC BLOOD PRESSURE: 124 MMHG | BODY MASS INDEX: 35.61 KG/M2

## 2022-01-24 DIAGNOSIS — M10.9 GOUT INVOLVING TOE OF LEFT FOOT, UNSPECIFIED CAUSE, UNSPECIFIED CHRONICITY: ICD-10-CM

## 2022-01-24 DIAGNOSIS — M10.9 ACUTE GOUT INVOLVING TOE OF LEFT FOOT, UNSPECIFIED CAUSE: ICD-10-CM

## 2022-01-24 DIAGNOSIS — M10.9 ACUTE GOUT INVOLVING TOE OF LEFT FOOT, UNSPECIFIED CAUSE: Primary | ICD-10-CM

## 2022-01-24 DIAGNOSIS — I10 ESSENTIAL HYPERTENSION: ICD-10-CM

## 2022-01-24 DIAGNOSIS — I10 HYPERTENSION, ESSENTIAL: ICD-10-CM

## 2022-01-24 LAB — URATE SERPL-MCNC: 6.4 MG/DL (ref 3.4–7)

## 2022-01-24 PROCEDURE — 3074F PR MOST RECENT SYSTOLIC BLOOD PRESSURE < 130 MM HG: ICD-10-PCS | Mod: HCNC,CPTII,S$GLB, | Performed by: INTERNAL MEDICINE

## 2022-01-24 PROCEDURE — 1160F RVW MEDS BY RX/DR IN RCRD: CPT | Mod: HCNC,CPTII,S$GLB, | Performed by: INTERNAL MEDICINE

## 2022-01-24 PROCEDURE — 3079F PR MOST RECENT DIASTOLIC BLOOD PRESSURE 80-89 MM HG: ICD-10-PCS | Mod: HCNC,CPTII,S$GLB, | Performed by: INTERNAL MEDICINE

## 2022-01-24 PROCEDURE — 3288F PR FALLS RISK ASSESSMENT DOCUMENTED: ICD-10-PCS | Mod: HCNC,CPTII,S$GLB, | Performed by: INTERNAL MEDICINE

## 2022-01-24 PROCEDURE — 84550 ASSAY OF BLOOD/URIC ACID: CPT | Mod: HCNC | Performed by: INTERNAL MEDICINE

## 2022-01-24 PROCEDURE — 1101F PT FALLS ASSESS-DOCD LE1/YR: CPT | Mod: HCNC,CPTII,S$GLB, | Performed by: INTERNAL MEDICINE

## 2022-01-24 PROCEDURE — 3008F PR BODY MASS INDEX (BMI) DOCUMENTED: ICD-10-PCS | Mod: HCNC,CPTII,S$GLB, | Performed by: INTERNAL MEDICINE

## 2022-01-24 PROCEDURE — 99213 PR OFFICE/OUTPT VISIT, EST, LEVL III, 20-29 MIN: ICD-10-PCS | Mod: HCNC,S$GLB,, | Performed by: INTERNAL MEDICINE

## 2022-01-24 PROCEDURE — 1125F AMNT PAIN NOTED PAIN PRSNT: CPT | Mod: HCNC,CPTII,S$GLB, | Performed by: INTERNAL MEDICINE

## 2022-01-24 PROCEDURE — 36415 COLL VENOUS BLD VENIPUNCTURE: CPT | Mod: HCNC | Performed by: INTERNAL MEDICINE

## 2022-01-24 PROCEDURE — 1159F MED LIST DOCD IN RCRD: CPT | Mod: HCNC,CPTII,S$GLB, | Performed by: INTERNAL MEDICINE

## 2022-01-24 PROCEDURE — 1101F PR PT FALLS ASSESS DOC 0-1 FALLS W/OUT INJ PAST YR: ICD-10-PCS | Mod: HCNC,CPTII,S$GLB, | Performed by: INTERNAL MEDICINE

## 2022-01-24 PROCEDURE — 99999 PR PBB SHADOW E&M-EST. PATIENT-LVL III: ICD-10-PCS | Mod: PBBFAC,HCNC,, | Performed by: INTERNAL MEDICINE

## 2022-01-24 PROCEDURE — 3074F SYST BP LT 130 MM HG: CPT | Mod: HCNC,CPTII,S$GLB, | Performed by: INTERNAL MEDICINE

## 2022-01-24 PROCEDURE — 1159F PR MEDICATION LIST DOCUMENTED IN MEDICAL RECORD: ICD-10-PCS | Mod: HCNC,CPTII,S$GLB, | Performed by: INTERNAL MEDICINE

## 2022-01-24 PROCEDURE — 1160F PR REVIEW ALL MEDS BY PRESCRIBER/CLIN PHARMACIST DOCUMENTED: ICD-10-PCS | Mod: HCNC,CPTII,S$GLB, | Performed by: INTERNAL MEDICINE

## 2022-01-24 PROCEDURE — 1125F PR PAIN SEVERITY QUANTIFIED, PAIN PRESENT: ICD-10-PCS | Mod: HCNC,CPTII,S$GLB, | Performed by: INTERNAL MEDICINE

## 2022-01-24 PROCEDURE — 99213 OFFICE O/P EST LOW 20 MIN: CPT | Mod: HCNC,S$GLB,, | Performed by: INTERNAL MEDICINE

## 2022-01-24 PROCEDURE — 3079F DIAST BP 80-89 MM HG: CPT | Mod: HCNC,CPTII,S$GLB, | Performed by: INTERNAL MEDICINE

## 2022-01-24 PROCEDURE — 99999 PR PBB SHADOW E&M-EST. PATIENT-LVL III: CPT | Mod: PBBFAC,HCNC,, | Performed by: INTERNAL MEDICINE

## 2022-01-24 PROCEDURE — 3288F FALL RISK ASSESSMENT DOCD: CPT | Mod: HCNC,CPTII,S$GLB, | Performed by: INTERNAL MEDICINE

## 2022-01-24 PROCEDURE — 3008F BODY MASS INDEX DOCD: CPT | Mod: HCNC,CPTII,S$GLB, | Performed by: INTERNAL MEDICINE

## 2022-01-24 RX ORDER — COLCHICINE 0.6 MG/1
0.6 CAPSULE ORAL DAILY
Qty: 90 CAPSULE | Refills: 1 | Status: SHIPPED | OUTPATIENT
Start: 2022-01-24 | End: 2023-01-24

## 2022-01-24 RX ORDER — PREDNISONE 20 MG/1
TABLET ORAL
Qty: 10 TABLET | Refills: 0 | Status: SHIPPED | OUTPATIENT
Start: 2022-01-24 | End: 2022-10-13

## 2022-01-24 RX ORDER — PREDNISONE 20 MG/1
TABLET ORAL
Qty: 10 TABLET | Refills: 0 | Status: SHIPPED | OUTPATIENT
Start: 2022-01-24 | End: 2022-01-24 | Stop reason: SDUPTHER

## 2022-01-24 NOTE — PROGRESS NOTES
Subjective:       Patient ID: Geovani Evans Jr. is a 76 y.o. male.    Chief Complaint: left foot swollen (Painful . )    HPI   C/o pain and swelling  left foot x 2 weeks.  Began with R foot 12/14, and was dx with cellulitis, and then gout 12/21.  Prednisone was effective, but recurred Jan 13; Indomethacin prescribed, but ineffective.  R foot pain finally resolved.    L foot pain does NOT feel like previous gouty pain.  Unlike before, he can walk, but uncomfortable to wear shoes.  Some pain in bed.     He has been taking allopurinol 100 mg daily.     Uric acid 8.8 in December.  Dr Chavez tx gout in 2017.    H/o foot surgery  L decades ago.    Also with essential htn, controlled today.         Review of Systems   Constitutional: Negative for activity change and unexpected weight change.   Respiratory: Negative for chest tightness and shortness of breath.    Cardiovascular: Negative for chest pain and leg swelling.   Gastrointestinal: Negative for abdominal pain.   Neurological: Negative for headaches.   Psychiatric/Behavioral: Negative for dysphoric mood.         Objective:      Physical Exam  Constitutional:       Appearance: Normal appearance. He is obese. He is not ill-appearing or diaphoretic.   Musculoskeletal:      Right lower leg: Edema (mild edema = bilat.) present.      Comments: Mild tenderness L MTP joint on L.     Neurological:      Mental Status: He is alert.   Psychiatric:         Mood and Affect: Mood normal.         Thought Content: Thought content normal.         Assessment:       Problem List Items Addressed This Visit     Hypertension, essential      Other Visit Diagnoses     Acute gout involving toe of left foot, unspecified cause    -  Primary    Relevant Medications    colchicine (MITIGARE) 0.6 mg Cap    Other Relevant Orders    Uric Acid    Essential hypertension        Gout involving toe of left foot, unspecified cause, unspecified chronicity        Relevant Medications    colchicine  (MITIGARE) 0.6 mg Cap          Plan:       Geovani was seen today for left foot swollen.    Diagnoses and all orders for this visit:    Acute gout involving toe of left foot, unspecified cause  -     colchicine (MITIGARE) 0.6 mg Cap; Take 1 capsule (0.6 mg total) by mouth once daily.  -     Uric Acid; Future    Essential hypertension    Gout involving toe of left foot, unspecified cause, unspecified chronicity  -     colchicine (MITIGARE) 0.6 mg Cap; Take 1 capsule (0.6 mg total) by mouth once daily.    Hypertension, essential    Other orders  -     Discontinue: predniSONE (DELTASONE) 20 MG tablet; 2 tabs po q day x 5 days.  -     predniSONE (DELTASONE) 20 MG tablet; 2 tabs po q day x 5 days.         See pt instructions    Call if no better

## 2022-01-24 NOTE — PATIENT INSTRUCTIONS
1.  Prednisone.  2 tabs daily for 5 days.    2.  Colchicine - 1 a day for 3 months, to prevent gout.    3.  Once your gout resolves completely, increase Allopurinol to 2 tabs a day x 3 weeks, then increase to 3 tabs a day.      4.  Check lab 2 months.

## 2022-03-14 ENCOUNTER — HOSPITAL ENCOUNTER (OUTPATIENT)
Dept: RADIOLOGY | Facility: HOSPITAL | Age: 77
Discharge: HOME | End: 2022-03-14
Attending: PODIATRIST
Payer: MEDICARE

## 2022-03-14 ENCOUNTER — OFFICE VISIT (OUTPATIENT)
Dept: PODIATRY | Facility: CLINIC | Age: 77
End: 2022-03-14
Payer: MEDICARE

## 2022-03-14 VITALS
DIASTOLIC BLOOD PRESSURE: 82 MMHG | SYSTOLIC BLOOD PRESSURE: 123 MMHG | WEIGHT: 233 LBS | BODY MASS INDEX: 35.43 KG/M2 | HEART RATE: 67 BPM

## 2022-03-14 DIAGNOSIS — B35.3 TINEA PEDIS OF BOTH FEET: Primary | ICD-10-CM

## 2022-03-14 DIAGNOSIS — M20.5X2 HALLUX LIMITUS OF LEFT FOOT: ICD-10-CM

## 2022-03-14 DIAGNOSIS — M79.672 LEFT FOOT PAIN: ICD-10-CM

## 2022-03-14 DIAGNOSIS — M79.2 NEURITIS: ICD-10-CM

## 2022-03-14 DIAGNOSIS — Z98.890 H/O FOOT SURGERY: ICD-10-CM

## 2022-03-14 DIAGNOSIS — B35.1 ONYCHOMYCOSIS DUE TO DERMATOPHYTE: ICD-10-CM

## 2022-03-14 DIAGNOSIS — Z87.39 H/O: GOUT: ICD-10-CM

## 2022-03-14 PROCEDURE — 3079F DIAST BP 80-89 MM HG: CPT | Mod: CPTII,S$GLB,, | Performed by: PODIATRIST

## 2022-03-14 PROCEDURE — 3074F PR MOST RECENT SYSTOLIC BLOOD PRESSURE < 130 MM HG: ICD-10-PCS | Mod: CPTII,S$GLB,, | Performed by: PODIATRIST

## 2022-03-14 PROCEDURE — 3079F PR MOST RECENT DIASTOLIC BLOOD PRESSURE 80-89 MM HG: ICD-10-PCS | Mod: CPTII,S$GLB,, | Performed by: PODIATRIST

## 2022-03-14 PROCEDURE — 3074F SYST BP LT 130 MM HG: CPT | Mod: CPTII,S$GLB,, | Performed by: PODIATRIST

## 2022-03-14 PROCEDURE — 73630 XR FOOT COMPLETE 3 VIEW LEFT: ICD-10-PCS | Mod: 26,LT,, | Performed by: INTERNAL MEDICINE

## 2022-03-14 PROCEDURE — 1159F PR MEDICATION LIST DOCUMENTED IN MEDICAL RECORD: ICD-10-PCS | Mod: CPTII,S$GLB,, | Performed by: PODIATRIST

## 2022-03-14 PROCEDURE — 1125F AMNT PAIN NOTED PAIN PRSNT: CPT | Mod: CPTII,S$GLB,, | Performed by: PODIATRIST

## 2022-03-14 PROCEDURE — 99204 PR OFFICE/OUTPT VISIT, NEW, LEVL IV, 45-59 MIN: ICD-10-PCS | Mod: S$GLB,,, | Performed by: PODIATRIST

## 2022-03-14 PROCEDURE — 1125F PR PAIN SEVERITY QUANTIFIED, PAIN PRESENT: ICD-10-PCS | Mod: CPTII,S$GLB,, | Performed by: PODIATRIST

## 2022-03-14 PROCEDURE — 99999 PR PBB SHADOW E&M-EST. PATIENT-LVL III: ICD-10-PCS | Mod: PBBFAC,,, | Performed by: PODIATRIST

## 2022-03-14 PROCEDURE — 1159F MED LIST DOCD IN RCRD: CPT | Mod: CPTII,S$GLB,, | Performed by: PODIATRIST

## 2022-03-14 PROCEDURE — 73630 X-RAY EXAM OF FOOT: CPT | Mod: TC,PN,LT

## 2022-03-14 PROCEDURE — 73630 X-RAY EXAM OF FOOT: CPT | Mod: 26,LT,, | Performed by: INTERNAL MEDICINE

## 2022-03-14 PROCEDURE — 99204 OFFICE O/P NEW MOD 45 MIN: CPT | Mod: S$GLB,,, | Performed by: PODIATRIST

## 2022-03-14 PROCEDURE — 99999 PR PBB SHADOW E&M-EST. PATIENT-LVL III: CPT | Mod: PBBFAC,,, | Performed by: PODIATRIST

## 2022-03-14 PROCEDURE — 3008F PR BODY MASS INDEX (BMI) DOCUMENTED: ICD-10-PCS | Mod: CPTII,S$GLB,, | Performed by: PODIATRIST

## 2022-03-14 PROCEDURE — 3008F BODY MASS INDEX DOCD: CPT | Mod: CPTII,S$GLB,, | Performed by: PODIATRIST

## 2022-03-14 RX ORDER — CLOTRIMAZOLE 1 %
CREAM (GRAM) TOPICAL 2 TIMES DAILY
Qty: 45 G | Refills: 1 | Status: SHIPPED | OUTPATIENT
Start: 2022-03-14 | End: 2022-10-13

## 2022-03-14 NOTE — PROGRESS NOTES
Subjective:      Patient ID: Geovani Evans Jr. is a 76 y.o. male.    Chief Complaint:   Shaquille Olivo is a 76 y.o. male who presents to the podiatry clinic  with complaint of  left foot pain. Onset of the symptoms was several weeks ago. Precipitating event: none known. Current symptoms include: tingling type feeling occasionally sometimes to touch sometimes when laying down.. Aggravating factors: walking. Symptoms have waxed and waned. Patient has had prior foot problems. Evaluation to date: none. Treatment to date: none. Patients rates pain 0/10 on pain scale.    Patient relates he is not currently on prednisone as he finished it for his gout attack.  Patient relates this seems different than the gout attack as the left toe hurts on the side but it is more but numbness or tingling than a pain.  Patient denies any injury.  He is not diabetic.    He relates about 40 years ago he had bone cut out of his left big toe.    The right foot is doing well that is the site of his previous gout attacks.    Patient relates his wife also want him to inquire about what can be done about his nails that are thick and difficult to cut.  Patient also relates he has some dry skin      Lab Results   Component Value Date    URICACID 6.4 01/24/2022          Past Medical History:   Diagnosis Date    Anemia     Hypertension     Hypogonadism male     Obesity     Thyroid nodule     neg bx 2/2011     Past Surgical History:   Procedure Laterality Date    EYE SURGERY Left     cataract     FRACTURE SURGERY  1970    left great toe. bunion repair/shaved bone     Current Outpatient Medications on File Prior to Visit   Medication Sig Dispense Refill    allopurinoL (ZYLOPRIM) 100 MG tablet Take 1 tablet (100 mg total) by mouth once daily. 30 tablet 6    colchicine (MITIGARE) 0.6 mg Cap Take 1 capsule (0.6 mg total) by mouth once daily. 90 capsule 1    lisinopriL-hydrochlorothiazide (PRINZIDE,ZESTORETIC) 10-12.5 mg per tablet Take 1  tablet by mouth once daily. 90 tablet 3    pravastatin (PRAVACHOL) 40 MG tablet Take 1 tablet (40 mg total) by mouth once daily. 90 tablet 3    predniSONE (DELTASONE) 20 MG tablet 2 tabs po q day x 5 days. 10 tablet 0    tamsulosin (FLOMAX) 0.4 mg Cap Take 1 capsule (0.4 mg total) by mouth every evening. (Patient taking differently: Take 0.4 mg by mouth every other day. ) 90 capsule 3     No current facility-administered medications on file prior to visit.     Review of patient's allergies indicates:  No Known Allergies    Review of Systems   Constitutional: Negative for chills, decreased appetite, fever, malaise/fatigue, night sweats, weight gain and weight loss.   Cardiovascular: Negative for chest pain, claudication, dyspnea on exertion, leg swelling, palpitations and syncope.   Respiratory: Negative for cough and shortness of breath.    Endocrine: Negative for cold intolerance and heat intolerance.   Hematologic/Lymphatic: Negative for bleeding problem. Does not bruise/bleed easily.   Skin: Positive for nail changes. Negative for color change, dry skin, flushing, itching, poor wound healing, rash, skin cancer, suspicious lesions and unusual hair distribution.   Musculoskeletal: Positive for arthritis. Negative for back pain, falls, gout, joint pain, joint swelling, muscle cramps, muscle weakness, myalgias, neck pain and stiffness.   Gastrointestinal: Negative for diarrhea, nausea and vomiting.   Neurological: Positive for numbness and paresthesias. Negative for dizziness, focal weakness, light-headedness, tremors, vertigo and weakness.   Psychiatric/Behavioral: Negative for altered mental status and depression. The patient does not have insomnia.    Allergic/Immunologic: Negative.            Objective:       Vitals:    03/14/22 1347   BP: 123/82   Pulse: 67   Weight: 105.7 kg (233 lb 0.4 oz)   PainSc:   1   105.7 kg (233 lb 0.4 oz)     Physical Exam  Vitals reviewed.   Constitutional:       General: He is not  in acute distress.     Appearance: He is well-developed. He is not ill-appearing, toxic-appearing or diaphoretic.      Comments: Proper supportive shoegear      Cardiovascular:      Pulses:           Dorsalis pedis pulses are 2+ on the right side and 2+ on the left side.        Posterior tibial pulses are 1+ on the right side and 1+ on the left side.   Musculoskeletal:         General: No swelling.      Right lower le+ Edema present.      Left lower le+ Edema present.      Right ankle: Normal.      Right Achilles Tendon: Normal. No tenderness.      Left ankle: Normal.      Left Achilles Tendon: Normal. No tenderness.      Right foot: Decreased range of motion. Deformity, bunion and prominent metatarsal heads present. No tenderness or bony tenderness.      Left foot: Decreased range of motion. Deformity, bunion, prominent metatarsal heads, tenderness and bony tenderness present. No crepitus.      Comments: + pain on palpation of 1st medial eminence; mild neuritis    Decreased range of motion mild crepitus   Feet:      Right foot:      Protective Sensation: 5 sites tested. 5 sites sensed.      Skin integrity: Dry skin present. No ulcer, blister, skin breakdown, erythema, warmth or callus.      Toenail Condition: Right toenails are abnormally thick. Fungal disease present.     Left foot:      Protective Sensation: 5 sites tested. 5 sites sensed.      Skin integrity: Dry skin present. No ulcer, blister, skin breakdown, erythema, warmth or callus.      Toenail Condition: Left toenails are abnormally thick.      Comments: No erythema or edema    Scaling dryness in a moccasin distribution is noted to the bilateral lower extremities with no erythema.    Skin:     General: Skin is warm.      Capillary Refill: Capillary refill takes 2 to 3 seconds.      Coloration: Skin is not pale.      Findings: No erythema or rash.      Nails: There is no clubbing.   Neurological:      Mental Status: He is alert and oriented to  person, place, and time.      Sensory: No sensory deficit.      Gait: Gait is intact.   Psychiatric:         Attention and Perception: Attention normal.         Mood and Affect: Mood normal.         Speech: Speech normal.         Behavior: Behavior normal.         Thought Content: Thought content normal.         Cognition and Memory: Cognition normal.         Judgment: Judgment normal.               Assessment:       Encounter Diagnoses   Name Primary?    Tinea pedis of both feet Yes    Onychomycosis due to dermatophyte     Hallux limitus of left foot     Neuritis     Left foot pain     H/O: gout     H/O foot surgery          Plan:       Geovani was seen today for bunions.    Diagnoses and all orders for this visit:    Tinea pedis of both feet    Onychomycosis due to dermatophyte    Hallux limitus of left foot  -     X-Ray Foot Complete Left; Future    Neuritis  -     X-Ray Foot Complete Left; Future    Left foot pain    H/O: gout    H/O foot surgery    Other orders  -     clotrimazole (LOTRIMIN) 1 % cream; Apply topically 2 (two) times daily. for 14 days      I counseled the patient on his conditions, their implications and medical management.    - rx antifugal cream; discussed options, will consider nail fungal treatment in the future briefly discussed option    - rx xrays    - d/w pt ideal may need surgical intervention to increase rom of 1st mTpj and decreased pain.  Patient relates he has had surgical intervention in the past and not interested at this time    - recommend consider injection    - dispensed MT pad sleeve    - recommend rtc 1 month. Pt relates he will consider. He relates it is not bothering him much           No follow-ups on file.

## 2022-03-24 ENCOUNTER — LAB VISIT (OUTPATIENT)
Dept: LAB | Facility: HOSPITAL | Age: 77
End: 2022-03-24
Attending: INTERNAL MEDICINE
Payer: MEDICARE

## 2022-03-24 DIAGNOSIS — M10.9 ACUTE GOUT INVOLVING TOE OF LEFT FOOT, UNSPECIFIED CAUSE: ICD-10-CM

## 2022-03-24 LAB — URATE SERPL-MCNC: 7.2 MG/DL (ref 3.4–7)

## 2022-03-24 PROCEDURE — 84550 ASSAY OF BLOOD/URIC ACID: CPT | Performed by: INTERNAL MEDICINE

## 2022-03-24 PROCEDURE — 36415 COLL VENOUS BLD VENIPUNCTURE: CPT | Performed by: INTERNAL MEDICINE

## 2022-03-27 DIAGNOSIS — M10.9 GOUT, UNSPECIFIED CAUSE, UNSPECIFIED CHRONICITY, UNSPECIFIED SITE: Primary | ICD-10-CM

## 2022-03-28 ENCOUNTER — TELEPHONE (OUTPATIENT)
Dept: INTERNAL MEDICINE | Facility: CLINIC | Age: 77
End: 2022-03-28
Payer: MEDICARE

## 2022-03-28 NOTE — TELEPHONE ENCOUNTER
Called and spoke to pt's wife and made lab appt and also took care of the medication and what he needs to take

## 2022-03-28 NOTE — TELEPHONE ENCOUNTER
----- Message from Julia Fonseca MD sent at 3/27/2022 11:24 AM CDT -----  Pls call - he doesn't read MO. Uric acid is too high.  Is he taking 3 tabs of allopurinol daily?  If so, increase to 4 tabs daily and recheck Uric Acid (UA) in 1 month. If he is not taking 3 tabs,refer to Patient instructions last note and gradually incr dose and recheck UA 2 months..  Let me know.

## 2022-03-28 NOTE — TELEPHONE ENCOUNTER
Pt informed. He is only taking the Allopurinol once a day. He never increased it. Do you want hi to take it twice a day or go straight for the 3 a day? He is currently asymptomatic.

## 2022-03-28 NOTE — TELEPHONE ENCOUNTER
2 allopurinol daily for 3 weeks, then 3 allopurinol tabs once a day.  Then check Uric Acid in 2 months.

## 2022-05-23 ENCOUNTER — LAB VISIT (OUTPATIENT)
Dept: LAB | Facility: HOSPITAL | Age: 77
End: 2022-05-23
Attending: INTERNAL MEDICINE
Payer: MEDICARE

## 2022-05-23 DIAGNOSIS — M10.9 GOUT, UNSPECIFIED CAUSE, UNSPECIFIED CHRONICITY, UNSPECIFIED SITE: ICD-10-CM

## 2022-05-23 LAB — URATE SERPL-MCNC: 6.7 MG/DL (ref 3.4–7)

## 2022-05-23 PROCEDURE — 84550 ASSAY OF BLOOD/URIC ACID: CPT | Performed by: INTERNAL MEDICINE

## 2022-05-23 PROCEDURE — 36415 COLL VENOUS BLD VENIPUNCTURE: CPT | Performed by: INTERNAL MEDICINE

## 2022-05-27 ENCOUNTER — TELEPHONE (OUTPATIENT)
Dept: INTERNAL MEDICINE | Facility: CLINIC | Age: 77
End: 2022-05-27
Payer: MEDICARE

## 2022-05-27 RX ORDER — ALLOPURINOL 100 MG/1
TABLET ORAL
Qty: 90 TABLET | Refills: 2 | Status: SHIPPED | OUTPATIENT
Start: 2022-05-27 | End: 2022-10-20

## 2022-05-27 NOTE — TELEPHONE ENCOUNTER
Called and discussed test results with the pt. He informed me that his is only taking 1 tablet a day. He stated that those are the directions on the Rx. Allopurinal is not on his current meds list. Colchicine 0.6mg is.

## 2022-05-27 NOTE — TELEPHONE ENCOUNTER
"----- Message from Julia Fonseca MD sent at 5/27/2022 11:19 AM CDT -----  Pls call - uric acid (marker for gout) is improved, but still not as low as I"d like (<6))  How many tabs of allopurinol is he taking a day? If he is taking 3, will increase to 4.  Let me know, pls  "

## 2022-05-27 NOTE — TELEPHONE ENCOUNTER
increase dose of allopurinol to 2 tabs daily for 3 weeks, then to 3 tab daily. At that time , I will incr dose of tab to 300mg. Let me know when he needs new rx.

## 2022-06-08 ENCOUNTER — OFFICE VISIT (OUTPATIENT)
Dept: OPTOMETRY | Facility: CLINIC | Age: 77
End: 2022-06-08
Payer: MEDICARE

## 2022-06-08 DIAGNOSIS — H52.13 MYOPIA WITH ASTIGMATISM AND PRESBYOPIA, BILATERAL: ICD-10-CM

## 2022-06-08 DIAGNOSIS — Z96.1 PSEUDOPHAKIA: ICD-10-CM

## 2022-06-08 DIAGNOSIS — H52.203 MYOPIA WITH ASTIGMATISM AND PRESBYOPIA, BILATERAL: ICD-10-CM

## 2022-06-08 DIAGNOSIS — H25.11 NUCLEAR SCLEROSIS, RIGHT: ICD-10-CM

## 2022-06-08 DIAGNOSIS — H43.393 VISUAL FLOATERS, BILATERAL: Primary | ICD-10-CM

## 2022-06-08 DIAGNOSIS — H04.123 DRY EYE SYNDROME OF BOTH EYES: ICD-10-CM

## 2022-06-08 DIAGNOSIS — H52.4 MYOPIA WITH ASTIGMATISM AND PRESBYOPIA, BILATERAL: ICD-10-CM

## 2022-06-08 DIAGNOSIS — H35.342 MACULAR HOLE OF LEFT EYE: ICD-10-CM

## 2022-06-08 DIAGNOSIS — H40.013 OAG (OPEN ANGLE GLAUCOMA) SUSPECT, LOW RISK, BILATERAL: ICD-10-CM

## 2022-06-08 PROCEDURE — 99999 PR PBB SHADOW E&M-EST. PATIENT-LVL II: ICD-10-PCS | Mod: PBBFAC,,, | Performed by: OPTOMETRIST

## 2022-06-08 PROCEDURE — 4010F ACE/ARB THERAPY RXD/TAKEN: CPT | Mod: CPTII,S$GLB,, | Performed by: OPTOMETRIST

## 2022-06-08 PROCEDURE — 92015 PR REFRACTION: ICD-10-PCS | Mod: S$GLB,,, | Performed by: OPTOMETRIST

## 2022-06-08 PROCEDURE — 3288F FALL RISK ASSESSMENT DOCD: CPT | Mod: CPTII,S$GLB,, | Performed by: OPTOMETRIST

## 2022-06-08 PROCEDURE — 1101F PT FALLS ASSESS-DOCD LE1/YR: CPT | Mod: CPTII,S$GLB,, | Performed by: OPTOMETRIST

## 2022-06-08 PROCEDURE — 1159F PR MEDICATION LIST DOCUMENTED IN MEDICAL RECORD: ICD-10-PCS | Mod: CPTII,S$GLB,, | Performed by: OPTOMETRIST

## 2022-06-08 PROCEDURE — 92133 POSTERIOR SEGMENT OCT OPTIC NERVE(OCULAR COHERENCE TOMOGRAPHY) - OU - BOTH EYES: ICD-10-PCS | Mod: S$GLB,,, | Performed by: OPTOMETRIST

## 2022-06-08 PROCEDURE — 1159F MED LIST DOCD IN RCRD: CPT | Mod: CPTII,S$GLB,, | Performed by: OPTOMETRIST

## 2022-06-08 PROCEDURE — 1126F PR PAIN SEVERITY QUANTIFIED, NO PAIN PRESENT: ICD-10-PCS | Mod: CPTII,S$GLB,, | Performed by: OPTOMETRIST

## 2022-06-08 PROCEDURE — 1101F PR PT FALLS ASSESS DOC 0-1 FALLS W/OUT INJ PAST YR: ICD-10-PCS | Mod: CPTII,S$GLB,, | Performed by: OPTOMETRIST

## 2022-06-08 PROCEDURE — 92004 PR EYE EXAM, NEW PATIENT,COMPREHESV: ICD-10-PCS | Mod: S$GLB,,, | Performed by: OPTOMETRIST

## 2022-06-08 PROCEDURE — 1160F PR REVIEW ALL MEDS BY PRESCRIBER/CLIN PHARMACIST DOCUMENTED: ICD-10-PCS | Mod: CPTII,S$GLB,, | Performed by: OPTOMETRIST

## 2022-06-08 PROCEDURE — 99999 PR PBB SHADOW E&M-EST. PATIENT-LVL II: CPT | Mod: PBBFAC,,, | Performed by: OPTOMETRIST

## 2022-06-08 PROCEDURE — 3288F PR FALLS RISK ASSESSMENT DOCUMENTED: ICD-10-PCS | Mod: CPTII,S$GLB,, | Performed by: OPTOMETRIST

## 2022-06-08 PROCEDURE — 1160F RVW MEDS BY RX/DR IN RCRD: CPT | Mod: CPTII,S$GLB,, | Performed by: OPTOMETRIST

## 2022-06-08 PROCEDURE — 1126F AMNT PAIN NOTED NONE PRSNT: CPT | Mod: CPTII,S$GLB,, | Performed by: OPTOMETRIST

## 2022-06-08 PROCEDURE — 4010F PR ACE/ARB THEARPY RXD/TAKEN: ICD-10-PCS | Mod: CPTII,S$GLB,, | Performed by: OPTOMETRIST

## 2022-06-08 PROCEDURE — 92004 COMPRE OPH EXAM NEW PT 1/>: CPT | Mod: S$GLB,,, | Performed by: OPTOMETRIST

## 2022-06-08 PROCEDURE — 92133 CPTRZD OPH DX IMG PST SGM ON: CPT | Mod: S$GLB,,, | Performed by: OPTOMETRIST

## 2022-06-08 PROCEDURE — 92015 DETERMINE REFRACTIVE STATE: CPT | Mod: S$GLB,,, | Performed by: OPTOMETRIST

## 2022-06-08 NOTE — PROGRESS NOTES
[facility]     MONALISA: 05/2019 with dr. Cruz  Chief complaint (CC): Patient is here for annual eye exam today.  Glasses   are distance only and patient hasn't noticed any vision changes since the   last exam.  Glasses? +5 yrs. old  Contacts? -  H/o eye surgery, injections or laser: macular hole repair OS,PC IOL OS  H/o eye injury: -  Known eye conditions? See above  Family h/o eye conditions? -  Eye gtts? -      (-) Flashes (+)  Floaters (-) Mucous   (-)  Tearing (-) Itching (-) Burning   (-) Headaches (-) Eye Pain/discomfort (-) Irritation   (-)  Redness (-) Double vision (-) Blurry vision    Diabetic? -  A1c? -        Last edited by Erika Arteaga on 6/8/2022 10:58 AM. (History)            Assessment /Plan     For exam results, see Encounter Report.    Visual floaters, bilateral    Dry eye syndrome of both eyes    Myopia with astigmatism and presbyopia, bilateral    Pseudophakia    Nuclear sclerosis, right    OAG (open angle glaucoma) suspect, low risk, bilateral  -     Posterior Segment OCT Optic Nerve- Both eyes    Macular hole of left eye  -     Posterior Segment OCT Optic Nerve- Both eyes      1. No e/o h/b/t 360 degrees OU. Monitor for worsening of symptoms or S/Sx of RD.   2. Recommend Systane Ultra or Refresh Optive BID-TID OU to aid with symptoms of dry eyes.  3. SRx released to patient. Patient educated on lens options. Normal ocular health. RTC 1 year for routine exam.   4. Good result.   5. Pt doesn't note difference in vision. Monitor.   6. (-) FHx. IOP 18 OD, OS. C/d 0.6 OD, 0.7 OS. 6/8/20/22 OD unable, OS borderline NI and TI. Educated pt on findings w/understanding. RTC 1 year Routine and repeat OCT  7. Stable. Repair w/Dr Gallagher.

## 2022-08-31 DIAGNOSIS — I10 HYPERTENSION, ESSENTIAL: ICD-10-CM

## 2022-10-13 ENCOUNTER — OFFICE VISIT (OUTPATIENT)
Dept: INTERNAL MEDICINE | Facility: CLINIC | Age: 77
End: 2022-10-13
Payer: MEDICARE

## 2022-10-13 ENCOUNTER — LAB VISIT (OUTPATIENT)
Dept: LAB | Facility: HOSPITAL | Age: 77
End: 2022-10-13
Payer: MEDICARE

## 2022-10-13 VITALS
BODY MASS INDEX: 34.32 KG/M2 | OXYGEN SATURATION: 97 % | HEIGHT: 68 IN | DIASTOLIC BLOOD PRESSURE: 70 MMHG | SYSTOLIC BLOOD PRESSURE: 118 MMHG | HEART RATE: 63 BPM | WEIGHT: 226.44 LBS

## 2022-10-13 DIAGNOSIS — R51.9 CHRONIC NONINTRACTABLE HEADACHE, UNSPECIFIED HEADACHE TYPE: ICD-10-CM

## 2022-10-13 DIAGNOSIS — I10 HYPERTENSION, ESSENTIAL: ICD-10-CM

## 2022-10-13 DIAGNOSIS — R51.9 RECURRENT HEADACHE: Primary | ICD-10-CM

## 2022-10-13 DIAGNOSIS — E78.2 MIXED HYPERLIPIDEMIA: ICD-10-CM

## 2022-10-13 DIAGNOSIS — E66.9 OBESITY (BMI 30.0-34.9): ICD-10-CM

## 2022-10-13 DIAGNOSIS — R51.9 RECURRENT HEADACHE: ICD-10-CM

## 2022-10-13 DIAGNOSIS — G89.29 CHRONIC NONINTRACTABLE HEADACHE, UNSPECIFIED HEADACHE TYPE: ICD-10-CM

## 2022-10-13 DIAGNOSIS — Z80.8 FAMILY HISTORY OF BRAIN CANCER: ICD-10-CM

## 2022-10-13 LAB
CREAT SERPL-MCNC: 1.3 MG/DL (ref 0.5–1.4)
EST. GFR  (NO RACE VARIABLE): 57.6 ML/MIN/1.73 M^2

## 2022-10-13 PROCEDURE — 3074F SYST BP LT 130 MM HG: CPT | Mod: CPTII,S$GLB,, | Performed by: NURSE PRACTITIONER

## 2022-10-13 PROCEDURE — 3074F PR MOST RECENT SYSTOLIC BLOOD PRESSURE < 130 MM HG: ICD-10-PCS | Mod: CPTII,S$GLB,, | Performed by: NURSE PRACTITIONER

## 2022-10-13 PROCEDURE — 1159F MED LIST DOCD IN RCRD: CPT | Mod: CPTII,S$GLB,, | Performed by: NURSE PRACTITIONER

## 2022-10-13 PROCEDURE — 3078F PR MOST RECENT DIASTOLIC BLOOD PRESSURE < 80 MM HG: ICD-10-PCS | Mod: CPTII,S$GLB,, | Performed by: NURSE PRACTITIONER

## 2022-10-13 PROCEDURE — 99214 OFFICE O/P EST MOD 30 MIN: CPT | Mod: S$GLB,,, | Performed by: NURSE PRACTITIONER

## 2022-10-13 PROCEDURE — 99999 PR PBB SHADOW E&M-EST. PATIENT-LVL III: CPT | Mod: PBBFAC,,, | Performed by: NURSE PRACTITIONER

## 2022-10-13 PROCEDURE — 82565 ASSAY OF CREATININE: CPT | Performed by: NURSE PRACTITIONER

## 2022-10-13 PROCEDURE — 3288F FALL RISK ASSESSMENT DOCD: CPT | Mod: CPTII,S$GLB,, | Performed by: NURSE PRACTITIONER

## 2022-10-13 PROCEDURE — 3288F PR FALLS RISK ASSESSMENT DOCUMENTED: ICD-10-PCS | Mod: CPTII,S$GLB,, | Performed by: NURSE PRACTITIONER

## 2022-10-13 PROCEDURE — 99214 PR OFFICE/OUTPT VISIT, EST, LEVL IV, 30-39 MIN: ICD-10-PCS | Mod: S$GLB,,, | Performed by: NURSE PRACTITIONER

## 2022-10-13 PROCEDURE — 4010F ACE/ARB THERAPY RXD/TAKEN: CPT | Mod: CPTII,S$GLB,, | Performed by: NURSE PRACTITIONER

## 2022-10-13 PROCEDURE — 1159F PR MEDICATION LIST DOCUMENTED IN MEDICAL RECORD: ICD-10-PCS | Mod: CPTII,S$GLB,, | Performed by: NURSE PRACTITIONER

## 2022-10-13 PROCEDURE — 4010F PR ACE/ARB THEARPY RXD/TAKEN: ICD-10-PCS | Mod: CPTII,S$GLB,, | Performed by: NURSE PRACTITIONER

## 2022-10-13 PROCEDURE — 36415 COLL VENOUS BLD VENIPUNCTURE: CPT | Performed by: NURSE PRACTITIONER

## 2022-10-13 PROCEDURE — 99999 PR PBB SHADOW E&M-EST. PATIENT-LVL III: ICD-10-PCS | Mod: PBBFAC,,, | Performed by: NURSE PRACTITIONER

## 2022-10-13 PROCEDURE — 1126F PR PAIN SEVERITY QUANTIFIED, NO PAIN PRESENT: ICD-10-PCS | Mod: CPTII,S$GLB,, | Performed by: NURSE PRACTITIONER

## 2022-10-13 PROCEDURE — 1101F PR PT FALLS ASSESS DOC 0-1 FALLS W/OUT INJ PAST YR: ICD-10-PCS | Mod: CPTII,S$GLB,, | Performed by: NURSE PRACTITIONER

## 2022-10-13 PROCEDURE — 1126F AMNT PAIN NOTED NONE PRSNT: CPT | Mod: CPTII,S$GLB,, | Performed by: NURSE PRACTITIONER

## 2022-10-13 PROCEDURE — 3078F DIAST BP <80 MM HG: CPT | Mod: CPTII,S$GLB,, | Performed by: NURSE PRACTITIONER

## 2022-10-13 PROCEDURE — 1101F PT FALLS ASSESS-DOCD LE1/YR: CPT | Mod: CPTII,S$GLB,, | Performed by: NURSE PRACTITIONER

## 2022-10-13 RX ORDER — LISINOPRIL AND HYDROCHLOROTHIAZIDE 10; 12.5 MG/1; MG/1
1 TABLET ORAL DAILY
Qty: 30 TABLET | Refills: 0 | Status: SHIPPED | OUTPATIENT
Start: 2022-10-13 | End: 2022-10-20 | Stop reason: SDUPTHER

## 2022-10-13 RX ORDER — PRAVASTATIN SODIUM 40 MG/1
40 TABLET ORAL DAILY
Qty: 30 TABLET | Refills: 0 | Status: SHIPPED | OUTPATIENT
Start: 2022-10-13 | End: 2022-10-20 | Stop reason: SDUPTHER

## 2022-10-13 RX ORDER — TAMSULOSIN HYDROCHLORIDE 0.4 MG/1
0.4 CAPSULE ORAL NIGHTLY
Qty: 90 CAPSULE | Refills: 3
Start: 2022-10-13 | End: 2022-10-20

## 2022-10-13 NOTE — PROGRESS NOTES
"Subjective:       Patient ID: Geovani Evans Jr. is a 76 y.o. male.    Chief Complaint: Headache    Pt of Dr. Fonseca, here for, "head aches 6 mo + but getting more frequent". Only occurs when sneezing. Concerned because his brother had brain cancer and  a few months ago. Also needs temporary refill on BP and Cholesterol meds. Overdue for annual with PCP Dr. Fonseca was due in July.    He mentioned this to Dr. Fonseca back in , MRI of brain was done and the findings showed the following:  IMPRESSION:   Age-appropriate generalized cerebral volume loss similar to prior.  Few nonspecific punctate foci of flair hyperintensity supratentorial white matter which may be sequela of the mild chronic ischemic change.  Otherwise unremarkable MRI brain without   evidence for acute infarction or enhancing lesion. Questionable bilateral globe proptosis and clinical correlation advised.    Headache   This is a chronic problem. The current episode started more than 1 month ago (6 months plus). The problem occurs intermittently (only when he sneezes). The problem has been gradually worsening. The pain is located in the Right unilateral region. The pain does not radiate. The pain quality is similar to prior headaches. The quality of the pain is described as aching. The pain is at a severity of 0/10. The patient is experiencing no pain. Pertinent negatives include no abdominal pain, blurred vision, dizziness, ear pain, eye pain, eye redness, eye watering, fever, hearing loss, muscle aches, nausea, neck pain, numbness, phonophobia, photophobia, rhinorrhea, seizures, sinus pressure, tingling, tinnitus, visual change, vomiting or weakness. The symptoms are aggravated by sneezing. He has tried nothing for the symptoms. The treatment provided no relief.   Review of Systems   Constitutional:  Negative for activity change, appetite change, chills, diaphoresis, fatigue, fever and unexpected weight change.   HENT:  Positive for " sneezing. Negative for ear pain, hearing loss, rhinorrhea, sinus pressure/congestion and tinnitus.    Eyes:  Negative for blurred vision, photophobia, pain and redness.   Respiratory:  Negative for chest tightness and shortness of breath.    Cardiovascular:  Negative for chest pain, palpitations, leg swelling and claudication.   Gastrointestinal:  Negative for abdominal pain, constipation, diarrhea, nausea, vomiting and reflux.   Genitourinary:  Negative for dysuria.   Musculoskeletal:  Negative for arthralgias, myalgias, neck pain and neck stiffness.   Allergic/Immunologic: Negative for environmental allergies, food allergies and frequent infections.   Neurological:  Positive for headaches. Negative for dizziness, vertigo, tingling, tremors, seizures, syncope, facial asymmetry, speech difficulty, weakness, light-headedness, numbness, coordination difficulties, memory loss and coordination difficulties.   Hematological:  Negative for adenopathy. Does not bruise/bleed easily.   Psychiatric/Behavioral:  Negative for suicidal ideas.        Review of patient's allergies indicates:  No Known Allergies    Current Outpatient Medications:     allopurinoL (ZYLOPRIM) 100 MG tablet, 2tabs po q day for 3 weeks, then 3 tabs po q day., Disp: 90 tablet, Rfl: 2    colchicine (MITIGARE) 0.6 mg Cap, Take 1 capsule (0.6 mg total) by mouth once daily., Disp: 90 capsule, Rfl: 1    lisinopriL-hydrochlorothiazide (PRINZIDE,ZESTORETIC) 10-12.5 mg per tablet, Take 1 tablet by mouth once daily., Disp: 90 tablet, Rfl: 3    pravastatin (PRAVACHOL) 40 MG tablet, Take 1 tablet (40 mg total) by mouth once daily., Disp: 90 tablet, Rfl: 3    tamsulosin (FLOMAX) 0.4 mg Cap, Take 1 capsule (0.4 mg total) by mouth every evening., Disp: 90 capsule, Rfl: 3    Patient Active Problem List   Diagnosis    Hypertension, essential    Male hypogonadism    Obesity, Class I, BMI 30-34.9    Anemia    Hyperlipidemia    Multinodular goiter (nontoxic)    Macular  "hole, left eye    Nuclear sclerosis, right    Hypertensive retinopathy of both eyes    Refractive error    Senile nuclear sclerosis    Carbohydrate intolerance    Pseudophakia    BPH (benign prostatic hyperplasia)     Past Medical History:   Diagnosis Date    Anemia     Arthritis     Cataract     Hypertension     Hypogonadism male     Obesity     Thyroid nodule     neg bx 2/2011     Past Surgical History:   Procedure Laterality Date    EYE SURGERY Left     cataract     FRACTURE SURGERY  1970    left great toe. bunion repair/shaved bone     Social History     Socioeconomic History    Marital status:    Occupational History    Occupation:      Comment: shell    Occupation: [employer]     Comment: nelly   Tobacco Use    Smoking status: Never    Smokeless tobacco: Never   Substance and Sexual Activity    Alcohol use: No    Drug use: No    Sexual activity: Not Currently   Social History Narrative    Water aerobics at [employer] 3 days a week.    Retired.    To celebrate 50th wedding celebration.     Family History   Problem Relation Age of Onset    Stroke Father     Heart disease Father     No Known Problems Mother     No Known Problems Brother     No Known Problems Daughter     No Known Problems Daughter     No Known Problems Daughter     Blindness Neg Hx     Cataracts Neg Hx     Diabetes Neg Hx     Glaucoma Neg Hx     Hypertension Neg Hx     Macular degeneration Neg Hx     Retinal detachment Neg Hx     Strabismus Neg Hx     Thyroid disease Neg Hx     Thyroid cancer Neg Hx        Objective:      Vitals:    10/13/22 1337   BP: 118/70   Pulse: 63   SpO2: 97%   Weight: 102.7 kg (226 lb 6.6 oz)   Height: 5' 8" (1.727 m)   PainSc: 0-No pain     Body mass index is 34.43 kg/m².    Physical Exam  Vitals and nursing note reviewed.   Constitutional:       Appearance: Normal appearance. He is obese.   HENT:      Head: Normocephalic.      Right Ear: Tympanic membrane, ear canal and external ear normal. There is " no impacted cerumen.      Left Ear: Tympanic membrane, ear canal and external ear normal. There is no impacted cerumen.      Nose: Nose normal.      Right Sinus: No maxillary sinus tenderness or frontal sinus tenderness.      Left Sinus: No maxillary sinus tenderness or frontal sinus tenderness.      Mouth/Throat:      Mouth: Mucous membranes are moist.      Pharynx: Oropharynx is clear. No oropharyngeal exudate or posterior oropharyngeal erythema.   Eyes:      Extraocular Movements: Extraocular movements intact.      Conjunctiva/sclera: Conjunctivae normal.      Pupils: Pupils are equal, round, and reactive to light.   Cardiovascular:      Rate and Rhythm: Normal rate and regular rhythm.      Pulses: Normal pulses.      Heart sounds: Normal heart sounds.   Pulmonary:      Effort: Pulmonary effort is normal.      Breath sounds: Normal breath sounds.   Musculoskeletal:         General: Normal range of motion.   Skin:     General: Skin is warm and dry.   Neurological:      General: No focal deficit present.      Mental Status: He is alert and oriented to person, place, and time.   Psychiatric:         Mood and Affect: Mood normal.         Behavior: Behavior normal.         Thought Content: Thought content normal.         Judgment: Judgment normal.       Assessment:       Problem List Items Addressed This Visit          Cardiac/Vascular    Hypertension, essential    Relevant Medications    lisinopriL-hydrochlorothiazide (PRINZIDE,ZESTORETIC) 10-12.5 mg per tablet    Hyperlipidemia    Relevant Medications    pravastatin (PRAVACHOL) 40 MG tablet     Other Visit Diagnoses       Recurrent headache    -  Primary    Relevant Medications    tamsulosin (FLOMAX) 0.4 mg Cap    Other Relevant Orders    MRI Brain W WO Contrast    Creatinine, serum    Chronic nonintractable headache, unspecified headache type        Relevant Orders    MRI Brain W WO Contrast    Family history of brain cancer        Relevant Orders    MRI Brain W WO  Contrast    BMI 34.0-34.9,adult        Obesity (BMI 30.0-34.9)                Plan:       Geovani was seen today for headache.    Diagnoses and all orders for this visit:    Recurrent headache  -     MRI Brain W WO Contrast; Future  -     Creatinine, serum; Future    Chronic nonintractable headache, unspecified headache type  -     MRI Brain W WO Contrast; Future    Family history of brain cancer  -     MRI Brain W WO Contrast; Future    BMI 34.0-34.9,adult  Bmi reviewed    Obesity (BMI 30.0-34.9)  BMI reviewed.    Diet and exercise to lose weight.    Hypertension, essential  -     lisinopriL-hydrochlorothiazide (PRINZIDE,ZESTORETIC) 10-12.5 mg per tablet; Take 1 tablet by mouth once daily.    Mixed hyperlipidemia  -     pravastatin (PRAVACHOL) 40 MG tablet; Take 1 tablet (40 mg total) by mouth once daily.    Recheck MRI of Brain, will message with results    Take otc Tylenol or Ibuprofen as needed for headache    Temporary fill of BP and cholesterol med, will need annual scheduled with PCP for further refills    Self care instructions provided in AVS    Follow up for next available with PCP Dr. Fonseca for annual and med refill.

## 2022-10-13 NOTE — PATIENT INSTRUCTIONS
Recheck MRI of Brain, will message with results    Take otc Tylenol or Ibuprofen as needed for headache    Temporary fill of BP and cholesterol med, will need annual scheduled with PCP for further refills

## 2022-10-20 ENCOUNTER — LAB VISIT (OUTPATIENT)
Dept: LAB | Facility: HOSPITAL | Age: 77
End: 2022-10-20
Attending: INTERNAL MEDICINE
Payer: MEDICARE

## 2022-10-20 ENCOUNTER — OFFICE VISIT (OUTPATIENT)
Dept: INTERNAL MEDICINE | Facility: CLINIC | Age: 77
End: 2022-10-20
Payer: MEDICARE

## 2022-10-20 VITALS
HEART RATE: 70 BPM | DIASTOLIC BLOOD PRESSURE: 74 MMHG | OXYGEN SATURATION: 99 % | BODY MASS INDEX: 34.21 KG/M2 | SYSTOLIC BLOOD PRESSURE: 114 MMHG | WEIGHT: 225.75 LBS | HEIGHT: 68 IN

## 2022-10-20 DIAGNOSIS — E66.9 OBESITY, CLASS I, BMI 30-34.9: ICD-10-CM

## 2022-10-20 DIAGNOSIS — N18.31 CHRONIC KIDNEY DISEASE, STAGE 3A: ICD-10-CM

## 2022-10-20 DIAGNOSIS — D64.9 ANEMIA, UNSPECIFIED TYPE: ICD-10-CM

## 2022-10-20 DIAGNOSIS — Z12.11 COLON CANCER SCREENING: ICD-10-CM

## 2022-10-20 DIAGNOSIS — R51.9 CHRONIC NONINTRACTABLE HEADACHE, UNSPECIFIED HEADACHE TYPE: ICD-10-CM

## 2022-10-20 DIAGNOSIS — E78.2 MIXED HYPERLIPIDEMIA: ICD-10-CM

## 2022-10-20 DIAGNOSIS — E04.2 MULTINODULAR GOITER (NONTOXIC): ICD-10-CM

## 2022-10-20 DIAGNOSIS — G89.29 CHRONIC NONINTRACTABLE HEADACHE, UNSPECIFIED HEADACHE TYPE: ICD-10-CM

## 2022-10-20 DIAGNOSIS — I10 HYPERTENSION, ESSENTIAL: ICD-10-CM

## 2022-10-20 DIAGNOSIS — Z00.00 ANNUAL PHYSICAL EXAM: ICD-10-CM

## 2022-10-20 DIAGNOSIS — E29.1 MALE HYPOGONADISM: ICD-10-CM

## 2022-10-20 DIAGNOSIS — E78.2 MIXED HYPERLIPIDEMIA: Primary | ICD-10-CM

## 2022-10-20 LAB
ALBUMIN SERPL BCP-MCNC: 4.3 G/DL (ref 3.5–5.2)
ALP SERPL-CCNC: 56 U/L (ref 55–135)
ALT SERPL W/O P-5'-P-CCNC: 12 U/L (ref 10–44)
ANION GAP SERPL CALC-SCNC: 9 MMOL/L (ref 8–16)
AST SERPL-CCNC: 19 U/L (ref 10–40)
BILIRUB SERPL-MCNC: 0.3 MG/DL (ref 0.1–1)
BUN SERPL-MCNC: 15 MG/DL (ref 8–23)
CALCIUM SERPL-MCNC: 10.4 MG/DL (ref 8.7–10.5)
CHLORIDE SERPL-SCNC: 103 MMOL/L (ref 95–110)
CHOLEST SERPL-MCNC: 170 MG/DL (ref 120–199)
CHOLEST/HDLC SERPL: 4.3 {RATIO} (ref 2–5)
CO2 SERPL-SCNC: 29 MMOL/L (ref 23–29)
CREAT SERPL-MCNC: 1.2 MG/DL (ref 0.5–1.4)
ERYTHROCYTE [DISTWIDTH] IN BLOOD BY AUTOMATED COUNT: 12.8 % (ref 11.5–14.5)
EST. GFR  (NO RACE VARIABLE): >60 ML/MIN/1.73 M^2
GLUCOSE SERPL-MCNC: 92 MG/DL (ref 70–110)
HCT VFR BLD AUTO: 39.8 % (ref 40–54)
HDLC SERPL-MCNC: 40 MG/DL (ref 40–75)
HDLC SERPL: 23.5 % (ref 20–50)
HGB BLD-MCNC: 12.4 G/DL (ref 14–18)
LDLC SERPL CALC-MCNC: 97.2 MG/DL (ref 63–159)
MCH RBC QN AUTO: 28.5 PG (ref 27–31)
MCHC RBC AUTO-ENTMCNC: 31.2 G/DL (ref 32–36)
MCV RBC AUTO: 92 FL (ref 82–98)
NONHDLC SERPL-MCNC: 130 MG/DL
PLATELET # BLD AUTO: 246 K/UL (ref 150–450)
PMV BLD AUTO: 11.4 FL (ref 9.2–12.9)
POTASSIUM SERPL-SCNC: 4.6 MMOL/L (ref 3.5–5.1)
PROT SERPL-MCNC: 8 G/DL (ref 6–8.4)
RBC # BLD AUTO: 4.35 M/UL (ref 4.6–6.2)
SODIUM SERPL-SCNC: 141 MMOL/L (ref 136–145)
TRIGL SERPL-MCNC: 164 MG/DL (ref 30–150)
TSH SERPL DL<=0.005 MIU/L-ACNC: 1.41 UIU/ML (ref 0.4–4)
WBC # BLD AUTO: 7.21 K/UL (ref 3.9–12.7)

## 2022-10-20 PROCEDURE — 3288F FALL RISK ASSESSMENT DOCD: CPT | Mod: CPTII,S$GLB,, | Performed by: INTERNAL MEDICINE

## 2022-10-20 PROCEDURE — 3074F PR MOST RECENT SYSTOLIC BLOOD PRESSURE < 130 MM HG: ICD-10-PCS | Mod: CPTII,S$GLB,, | Performed by: INTERNAL MEDICINE

## 2022-10-20 PROCEDURE — 99999 PR PBB SHADOW E&M-EST. PATIENT-LVL III: CPT | Mod: PBBFAC,,, | Performed by: INTERNAL MEDICINE

## 2022-10-20 PROCEDURE — 99397 PR PREVENTIVE VISIT,EST,65 & OVER: ICD-10-PCS | Mod: S$GLB,,, | Performed by: INTERNAL MEDICINE

## 2022-10-20 PROCEDURE — 99999 PR PBB SHADOW E&M-EST. PATIENT-LVL III: ICD-10-PCS | Mod: PBBFAC,,, | Performed by: INTERNAL MEDICINE

## 2022-10-20 PROCEDURE — 80061 LIPID PANEL: CPT | Performed by: INTERNAL MEDICINE

## 2022-10-20 PROCEDURE — 1159F PR MEDICATION LIST DOCUMENTED IN MEDICAL RECORD: ICD-10-PCS | Mod: CPTII,S$GLB,, | Performed by: INTERNAL MEDICINE

## 2022-10-20 PROCEDURE — 99499 RISK ADDL DX/OHS AUDIT: ICD-10-PCS | Mod: HCNC,S$GLB,, | Performed by: INTERNAL MEDICINE

## 2022-10-20 PROCEDURE — 1126F AMNT PAIN NOTED NONE PRSNT: CPT | Mod: CPTII,S$GLB,, | Performed by: INTERNAL MEDICINE

## 2022-10-20 PROCEDURE — 85027 COMPLETE CBC AUTOMATED: CPT | Performed by: INTERNAL MEDICINE

## 2022-10-20 PROCEDURE — 4010F ACE/ARB THERAPY RXD/TAKEN: CPT | Mod: CPTII,S$GLB,, | Performed by: INTERNAL MEDICINE

## 2022-10-20 PROCEDURE — 36415 COLL VENOUS BLD VENIPUNCTURE: CPT | Performed by: INTERNAL MEDICINE

## 2022-10-20 PROCEDURE — 1101F PT FALLS ASSESS-DOCD LE1/YR: CPT | Mod: CPTII,S$GLB,, | Performed by: INTERNAL MEDICINE

## 2022-10-20 PROCEDURE — 3074F SYST BP LT 130 MM HG: CPT | Mod: CPTII,S$GLB,, | Performed by: INTERNAL MEDICINE

## 2022-10-20 PROCEDURE — 1101F PR PT FALLS ASSESS DOC 0-1 FALLS W/OUT INJ PAST YR: ICD-10-PCS | Mod: CPTII,S$GLB,, | Performed by: INTERNAL MEDICINE

## 2022-10-20 PROCEDURE — 80053 COMPREHEN METABOLIC PANEL: CPT | Performed by: INTERNAL MEDICINE

## 2022-10-20 PROCEDURE — 1159F MED LIST DOCD IN RCRD: CPT | Mod: CPTII,S$GLB,, | Performed by: INTERNAL MEDICINE

## 2022-10-20 PROCEDURE — 3078F PR MOST RECENT DIASTOLIC BLOOD PRESSURE < 80 MM HG: ICD-10-PCS | Mod: CPTII,S$GLB,, | Performed by: INTERNAL MEDICINE

## 2022-10-20 PROCEDURE — 1126F PR PAIN SEVERITY QUANTIFIED, NO PAIN PRESENT: ICD-10-PCS | Mod: CPTII,S$GLB,, | Performed by: INTERNAL MEDICINE

## 2022-10-20 PROCEDURE — 3078F DIAST BP <80 MM HG: CPT | Mod: CPTII,S$GLB,, | Performed by: INTERNAL MEDICINE

## 2022-10-20 PROCEDURE — 3288F PR FALLS RISK ASSESSMENT DOCUMENTED: ICD-10-PCS | Mod: CPTII,S$GLB,, | Performed by: INTERNAL MEDICINE

## 2022-10-20 PROCEDURE — 84443 ASSAY THYROID STIM HORMONE: CPT | Performed by: INTERNAL MEDICINE

## 2022-10-20 PROCEDURE — 99397 PER PM REEVAL EST PAT 65+ YR: CPT | Mod: S$GLB,,, | Performed by: INTERNAL MEDICINE

## 2022-10-20 PROCEDURE — 4010F PR ACE/ARB THEARPY RXD/TAKEN: ICD-10-PCS | Mod: CPTII,S$GLB,, | Performed by: INTERNAL MEDICINE

## 2022-10-20 PROCEDURE — 99499 UNLISTED E&M SERVICE: CPT | Mod: HCNC,S$GLB,, | Performed by: INTERNAL MEDICINE

## 2022-10-20 RX ORDER — LISINOPRIL AND HYDROCHLOROTHIAZIDE 10; 12.5 MG/1; MG/1
1 TABLET ORAL DAILY
Qty: 90 TABLET | Refills: 3 | Status: SHIPPED | OUTPATIENT
Start: 2022-10-20 | End: 2024-01-15 | Stop reason: SDUPTHER

## 2022-10-20 RX ORDER — PRAVASTATIN SODIUM 40 MG/1
40 TABLET ORAL DAILY
Qty: 90 TABLET | Refills: 3 | Status: SHIPPED | OUTPATIENT
Start: 2022-10-20 | End: 2024-01-15 | Stop reason: SDUPTHER

## 2022-10-20 NOTE — PROGRESS NOTES
Subjective:       Patient ID: Geovani Evans Jr. is a 76 y.o. male.    Chief Complaint: Annual Exam    HPI  He c/o headaches whenever he sneezes, chronic and stable for years.  He had similar symptoms in , when he last had an unremarkable brain MRI.  Pain is R frontal, or central.  Rest or tylenol help, as do sinus meds.   May only last 5 min, up to 10 min.   No  head congestion, drainage, drip.  Vision normal.  No headache when bearing down, sexual intercourse, cough.  He doesn't sneeze often.  Brother recently  of brain cancer, so he is concerned .  If it weren't for his brother's illness, he would not have sought medical care. Madelyn Epps ordered MRI, which is upcoming    No recent gout.  Last attack in .  Triggered by seafood.  He NEVER increased allopurinol, as recommended.  In fact, he has stopped it.    Htn controlled, complicated by  CKD 3 by lab 10/13.    BMI stable at 34.    Hypogonadism, not interested in restarting T.    Chronic anemia.    MNG    No longer taking flomax for bph.    BMI 34.  He works out some at [employer].  Review of Systems   Constitutional:  Negative for activity change and unexpected weight change.   HENT:  Negative for postnasal drip, rhinorrhea and sinus pressure/congestion.    Respiratory:  Negative for chest tightness and shortness of breath.    Cardiovascular:  Negative for chest pain and leg swelling.   Gastrointestinal:  Negative for abdominal pain, nausea and vomiting.   Genitourinary:  Negative for difficulty urinating, dysuria, hematuria and urgency.   Integumentary:  Negative for rash.   Neurological:  Negative for headaches.   Psychiatric/Behavioral:  Negative for dysphoric mood and sleep disturbance. The patient is not nervous/anxious.        Objective:      Physical Exam  Constitutional:       General: He is not in acute distress.     Appearance: He is well-developed. He is not ill-appearing, toxic-appearing or diaphoretic.   HENT:      Head: Normocephalic  and atraumatic.   Eyes:      General: No scleral icterus.        Left eye: No discharge.      Conjunctiva/sclera: Conjunctivae normal.   Neck:      Thyroid: No thyromegaly.      Vascular: No JVD.   Cardiovascular:      Rate and Rhythm: Normal rate and regular rhythm.      Heart sounds: Normal heart sounds. No murmur heard.  Pulmonary:      Effort: Pulmonary effort is normal. No respiratory distress.      Breath sounds: Normal breath sounds.   Abdominal:      General: There is no distension.      Palpations: Abdomen is soft. There is no mass.      Tenderness: There is no abdominal tenderness. There is no guarding or rebound.      Hernia: No hernia is present.   Musculoskeletal:      Cervical back: Normal range of motion. No rigidity.      Right lower leg: No edema.      Left lower leg: No edema.   Lymphadenopathy:      Cervical: No cervical adenopathy.   Skin:     General: Skin is dry.      Findings: No rash.   Neurological:      General: No focal deficit present.      Mental Status: He is alert and oriented to person, place, and time.   Psychiatric:         Behavior: Behavior normal.         Thought Content: Thought content normal.         Judgment: Judgment normal.       Assessment:       Problem List Items Addressed This Visit       Obesity, Class I, BMI 30-34.9    Relevant Orders    CBC Without Differential    Multinodular goiter (nontoxic)    Relevant Orders    TSH    Male hypogonadism    Hypertension, essential    Relevant Medications    lisinopriL-hydrochlorothiazide (PRINZIDE,ZESTORETIC) 10-12.5 mg per tablet    Other Relevant Orders    Comprehensive Metabolic Panel    Hyperlipidemia - Primary    Relevant Medications    pravastatin (PRAVACHOL) 40 MG tablet    Other Relevant Orders    Lipid Panel    Chronic kidney disease, stage 3a    Anemia     Other Visit Diagnoses       Annual physical exam        Chronic nonintractable headache, unspecified headache type        Colon cancer screening        Relevant Orders     Ambulatory referral/consult to Endo Procedure             Plan:       Geovani was seen today for annual exam.    Diagnoses and all orders for this visit:    Mixed hyperlipidemia  -     pravastatin (PRAVACHOL) 40 MG tablet; Take 1 tablet (40 mg total) by mouth once daily.  -     Lipid Panel; Future    Multinodular goiter (nontoxic)  -     TSH; Future    Hypertension, essential  -     lisinopriL-hydrochlorothiazide (PRINZIDE,ZESTORETIC) 10-12.5 mg per tablet; Take 1 tablet by mouth once daily.  -     Comprehensive Metabolic Panel; Future    Male hypogonadism    Obesity, Class I, BMI 30-34.9  -     CBC Without Differential; Future    Anemia, unspecified type    Chronic kidney disease, stage 3a    Annual physical exam    Chronic nonintractable headache, unspecified headache type    Colon cancer screening  -     Ambulatory referral/consult to Endo Procedure ; Future         Declines flu, covid vaccines.  Exercise regularly.  Weight loss diet reviewed.   Declines Testosterone replacement

## 2022-10-31 ENCOUNTER — HOSPITAL ENCOUNTER (OUTPATIENT)
Dept: RADIOLOGY | Facility: HOSPITAL | Age: 77
Discharge: HOME | End: 2022-10-31
Attending: NURSE PRACTITIONER
Payer: MEDICARE

## 2022-10-31 DIAGNOSIS — R51.9 CHRONIC NONINTRACTABLE HEADACHE, UNSPECIFIED HEADACHE TYPE: ICD-10-CM

## 2022-10-31 DIAGNOSIS — R51.9 RECURRENT HEADACHE: ICD-10-CM

## 2022-10-31 DIAGNOSIS — Z80.8 FAMILY HISTORY OF BRAIN CANCER: ICD-10-CM

## 2022-10-31 DIAGNOSIS — G89.29 CHRONIC NONINTRACTABLE HEADACHE, UNSPECIFIED HEADACHE TYPE: ICD-10-CM

## 2022-10-31 PROCEDURE — 70553 MRI BRAIN STEM W/O & W/DYE: CPT | Mod: TC

## 2022-10-31 PROCEDURE — 25500020 PHARM REV CODE 255: Performed by: NURSE PRACTITIONER

## 2022-10-31 PROCEDURE — 70553 MRI BRAIN STEM W/O & W/DYE: CPT | Mod: 26,,, | Performed by: RADIOLOGY

## 2022-10-31 PROCEDURE — 70553 MRI BRAIN W WO CONTRAST: ICD-10-PCS | Mod: 26,,, | Performed by: RADIOLOGY

## 2022-10-31 PROCEDURE — A9585 GADOBUTROL INJECTION: HCPCS | Performed by: NURSE PRACTITIONER

## 2022-10-31 RX ORDER — GADOBUTROL 604.72 MG/ML
10 INJECTION INTRAVENOUS
Status: COMPLETED | OUTPATIENT
Start: 2022-10-31 | End: 2022-10-31

## 2022-10-31 RX ADMIN — GADOBUTROL 10 ML: 604.72 INJECTION INTRAVENOUS at 08:10

## 2022-11-23 ENCOUNTER — PES CALL (OUTPATIENT)
Dept: ADMINISTRATIVE | Facility: CLINIC | Age: 77
End: 2022-11-23
Payer: MEDICARE

## 2022-12-21 ENCOUNTER — OFFICE VISIT (OUTPATIENT)
Dept: INTERNAL MEDICINE | Facility: CLINIC | Age: 77
End: 2022-12-21
Payer: MEDICARE

## 2022-12-21 VITALS
BODY MASS INDEX: 34.72 KG/M2 | OXYGEN SATURATION: 97 % | HEIGHT: 68 IN | DIASTOLIC BLOOD PRESSURE: 76 MMHG | SYSTOLIC BLOOD PRESSURE: 124 MMHG | WEIGHT: 229.06 LBS | HEART RATE: 75 BPM

## 2022-12-21 DIAGNOSIS — E04.2 MULTINODULAR GOITER (NONTOXIC): ICD-10-CM

## 2022-12-21 DIAGNOSIS — E78.5 HYPERLIPIDEMIA, UNSPECIFIED HYPERLIPIDEMIA TYPE: ICD-10-CM

## 2022-12-21 DIAGNOSIS — I10 HYPERTENSION, ESSENTIAL: ICD-10-CM

## 2022-12-21 DIAGNOSIS — Z00.00 ENCOUNTER FOR PREVENTIVE HEALTH EXAMINATION: Primary | ICD-10-CM

## 2022-12-21 DIAGNOSIS — E66.9 OBESITY, CLASS I, BMI 30-34.9: ICD-10-CM

## 2022-12-21 DIAGNOSIS — D64.9 ANEMIA, UNSPECIFIED TYPE: ICD-10-CM

## 2022-12-21 DIAGNOSIS — N18.31 CHRONIC KIDNEY DISEASE, STAGE 3A: ICD-10-CM

## 2022-12-21 DIAGNOSIS — N40.1 BENIGN PROSTATIC HYPERPLASIA WITH LOWER URINARY TRACT SYMPTOMS, SYMPTOM DETAILS UNSPECIFIED: ICD-10-CM

## 2022-12-21 PROCEDURE — 1160F RVW MEDS BY RX/DR IN RCRD: CPT | Mod: HCNC,CPTII,S$GLB, | Performed by: NURSE PRACTITIONER

## 2022-12-21 PROCEDURE — 1170F PR FUNCTIONAL STATUS ASSESSED: ICD-10-PCS | Mod: HCNC,CPTII,S$GLB, | Performed by: NURSE PRACTITIONER

## 2022-12-21 PROCEDURE — 1126F AMNT PAIN NOTED NONE PRSNT: CPT | Mod: HCNC,CPTII,S$GLB, | Performed by: NURSE PRACTITIONER

## 2022-12-21 PROCEDURE — 3074F PR MOST RECENT SYSTOLIC BLOOD PRESSURE < 130 MM HG: ICD-10-PCS | Mod: HCNC,CPTII,S$GLB, | Performed by: NURSE PRACTITIONER

## 2022-12-21 PROCEDURE — 1159F PR MEDICATION LIST DOCUMENTED IN MEDICAL RECORD: ICD-10-PCS | Mod: HCNC,CPTII,S$GLB, | Performed by: NURSE PRACTITIONER

## 2022-12-21 PROCEDURE — 1126F PR PAIN SEVERITY QUANTIFIED, NO PAIN PRESENT: ICD-10-PCS | Mod: HCNC,CPTII,S$GLB, | Performed by: NURSE PRACTITIONER

## 2022-12-21 PROCEDURE — 99499 UNLISTED E&M SERVICE: CPT | Mod: HCNC,S$GLB,, | Performed by: NURSE PRACTITIONER

## 2022-12-21 PROCEDURE — 3288F PR FALLS RISK ASSESSMENT DOCUMENTED: ICD-10-PCS | Mod: HCNC,CPTII,S$GLB, | Performed by: NURSE PRACTITIONER

## 2022-12-21 PROCEDURE — 3078F DIAST BP <80 MM HG: CPT | Mod: HCNC,CPTII,S$GLB, | Performed by: NURSE PRACTITIONER

## 2022-12-21 PROCEDURE — 99999 PR PBB SHADOW E&M-EST. PATIENT-LVL III: CPT | Mod: PBBFAC,HCNC,, | Performed by: NURSE PRACTITIONER

## 2022-12-21 PROCEDURE — 4010F ACE/ARB THERAPY RXD/TAKEN: CPT | Mod: HCNC,CPTII,S$GLB, | Performed by: NURSE PRACTITIONER

## 2022-12-21 PROCEDURE — 3074F SYST BP LT 130 MM HG: CPT | Mod: HCNC,CPTII,S$GLB, | Performed by: NURSE PRACTITIONER

## 2022-12-21 PROCEDURE — 3078F PR MOST RECENT DIASTOLIC BLOOD PRESSURE < 80 MM HG: ICD-10-PCS | Mod: HCNC,CPTII,S$GLB, | Performed by: NURSE PRACTITIONER

## 2022-12-21 PROCEDURE — 1159F MED LIST DOCD IN RCRD: CPT | Mod: HCNC,CPTII,S$GLB, | Performed by: NURSE PRACTITIONER

## 2022-12-21 PROCEDURE — 99999 PR PBB SHADOW E&M-EST. PATIENT-LVL III: ICD-10-PCS | Mod: PBBFAC,HCNC,, | Performed by: NURSE PRACTITIONER

## 2022-12-21 PROCEDURE — 1101F PR PT FALLS ASSESS DOC 0-1 FALLS W/OUT INJ PAST YR: ICD-10-PCS | Mod: HCNC,CPTII,S$GLB, | Performed by: NURSE PRACTITIONER

## 2022-12-21 PROCEDURE — 1160F PR REVIEW ALL MEDS BY PRESCRIBER/CLIN PHARMACIST DOCUMENTED: ICD-10-PCS | Mod: HCNC,CPTII,S$GLB, | Performed by: NURSE PRACTITIONER

## 2022-12-21 PROCEDURE — 4010F PR ACE/ARB THEARPY RXD/TAKEN: ICD-10-PCS | Mod: HCNC,CPTII,S$GLB, | Performed by: NURSE PRACTITIONER

## 2022-12-21 PROCEDURE — 1101F PT FALLS ASSESS-DOCD LE1/YR: CPT | Mod: HCNC,CPTII,S$GLB, | Performed by: NURSE PRACTITIONER

## 2022-12-21 PROCEDURE — 99499 RISK ADDL DX/OHS AUDIT: ICD-10-PCS | Mod: HCNC,S$GLB,, | Performed by: NURSE PRACTITIONER

## 2022-12-21 PROCEDURE — 3288F FALL RISK ASSESSMENT DOCD: CPT | Mod: HCNC,CPTII,S$GLB, | Performed by: NURSE PRACTITIONER

## 2022-12-21 PROCEDURE — G0439 PR MEDICARE ANNUAL WELLNESS SUBSEQUENT VISIT: ICD-10-PCS | Mod: HCNC,S$GLB,, | Performed by: NURSE PRACTITIONER

## 2022-12-21 PROCEDURE — 1170F FXNL STATUS ASSESSED: CPT | Mod: HCNC,CPTII,S$GLB, | Performed by: NURSE PRACTITIONER

## 2022-12-21 PROCEDURE — G0439 PPPS, SUBSEQ VISIT: HCPCS | Mod: HCNC,S$GLB,, | Performed by: NURSE PRACTITIONER

## 2022-12-21 NOTE — PATIENT INSTRUCTIONS
Counseling and Referral of Other Preventative  (Italic type indicates deductible and co-insurance are waived)    Patient Name: Geovani Evans  Today's Date: 12/21/2022    Health Maintenance       Date Due Completion Date    Colorectal Cancer Screening 07/18/2021 7/18/2011    COVID-19 Vaccine (4 - Booster for Pfizer series) 03/02/2022 1/5/2022    Influenza Vaccine (1) 09/01/2022 9/9/2020    Shingles Vaccine (1 of 2) 10/20/2023 (Originally 10/24/1997) ---    Lipid Panel 10/20/2023 10/20/2022    TETANUS VACCINE 11/04/2023 11/4/2013        No orders of the defined types were placed in this encounter.      The following information is provided to all patients.  This information is to help you find resources for any of the problems found today that may be affecting your health:                Living healthy guide: www.[facility].[place].gov      Understanding Diabetes: www.diabetes.org      Eating healthy: www.cdc.gov/healthyweight      [facility] home safety checklist: www.cdc.gov/steadi/patient.html      Agency on Aging: www.goea.[place].[facility]      Alcoholics anonymous (AA): www.aa.org      Physical Activity: www.sophie.nih.gov/pk9ogfy      Tobacco use: www.quitwithusla.org

## 2022-12-21 NOTE — PROGRESS NOTES
"  Geovani Evans presented for a  Medicare AWV and comprehensive Health Risk Assessment today. The following components were reviewed and updated:    Medical history  Family History  Social history  Allergies and Current Medications  Health Risk Assessment  Health Maintenance  Care Team         ** See Completed Assessments for Annual Wellness Visit within the encounter summary.**         The following assessments were completed:  Living Situation  CAGE  Depression Screening  Timed Get Up and Go  Whisper Test  Cognitive Function Screening      Nutrition Screening  ADL Screening  PAQ Screening  OPIOID Screening: Patient does not have a prescription for narcotics. Patient does not use substance         Vitals:    12/21/22 0856 12/21/22 0904   BP:  124/76   BP Location:  Right arm   Pulse:  75   SpO2:  97%   Weight: 103.9 kg (229 lb 0.9 oz)    Height: 5' 8" (1.727 m)      Body mass index is 34.83 kg/m².  Physical Exam  Vitals and nursing note reviewed.   Constitutional:       Appearance: He is well-developed.   HENT:      Head: Normocephalic.   Cardiovascular:      Rate and Rhythm: Normal rate and regular rhythm.   Pulmonary:      Effort: Pulmonary effort is normal.      Breath sounds: Normal breath sounds.   Abdominal:      General: Bowel sounds are normal.      Palpations: Abdomen is soft.   Musculoskeletal:         General: Normal range of motion.   Skin:     General: Skin is warm and dry.   Neurological:      Mental Status: He is alert and oriented to person, place, and time.      Motor: No abnormal muscle tone.   Psychiatric:         Mood and Affect: Mood normal.             Diagnoses and health risks identified today and associated recommendations/orders:    1. Encounter for preventive health examination  Here for Health Risk Assessment/Annual Wellness Visit.  Health maintenance reviewed and updated. Follow up in one year.   Discussed influenza and Covid booster - he has URI today and will delay until next " week.  Prescription given for Shingrix.    2. Hypertension, essential  Chronic, stable on current medications. Followed by PCP.     3. Hyperlipidemia, unspecified hyperlipidemia type  Chronic, stable on current medication. Followed by PCP.     4. Chronic kidney disease, stage 3a  Chronic, stable on current medications. Followed by PCP.     5. Benign prostatic hyperplasia with lower urinary tract symptoms, symptom details unspecified  Chronic, stable. Followed by PCP.    6. Anemia, unspecified type  Chronic, stable. Followed by PCP.    7. Obesity, Class I, BMI 30-34.9  Chronic, he is exercising 2-3 days weekly at fitness center. Reinforced diet per PCP recommendations. Followed by PCP    8. Multinodular goiter (nontoxic)  Chronic, stable. Followed by PCP.      Provided Olivo with a 5-10 year written screening schedule and personal prevention plan. Recommendations were developed using the USPSTF age appropriate recommendations. Education, counseling, and referrals were provided as needed. After Visit Summary printed and given to patient which includes a list of additional screenings\tests needed.    Follow up in about 10 months (around 10/20/2023).with PCP    Gilda Hobbs NP

## 2023-02-09 DIAGNOSIS — Z00.00 ENCOUNTER FOR MEDICARE ANNUAL WELLNESS EXAM: ICD-10-CM

## 2023-03-27 NOTE — PROGRESS NOTES
INTERNAL MEDICINE CLINIC - SAME DAY APPOINTMENT  Progress Note    PRESENTING HISTORY     PCP: Julia oFnseca MD    Chief Complaint/Reason for Visit:   No chief complaint on file.    History of Present Illness & ROS : Mr. Geovani Evans Jr. is a 77 y.o. male.    Same day appt  New to me.   Very pleasant gentleman.   Reports that while doing weighted- leg extensions at the gym 2 weeks ago, did not hear or feel a pop, but has been having pain to the left knee since.   Denies history of trauma to the affected joint.   Ice and Tylenol helps.   Flexion causes 'pain'.    Review of Systems:  Eyes: denies visual changes at this time denies floaters   ENT: no nasal congestion or sore throat  Respiratory: no cough or shorness of breath  Cardiovascular: no chest pain or palpitations  Gastrointestinal: no nausea or vomiting, no abdominal pain or change in bowel habits  Genitourinary: no hematuria or dysuria; denies frequency  Hematologic/Lymphatic: no easy bruising or lymphadenopathy  Neurological: no seizures or tremors  Endocrine: no heat or cold intolerance      PAST HISTORY:     Past Medical History:   Diagnosis Date    Anemia     Arthritis     Cataract     Hypertension     Hypogonadism male     Obesity     Thyroid nodule     neg bx 2/2011       Past Surgical History:   Procedure Laterality Date    EYE SURGERY Left     cataract     FRACTURE SURGERY  1970    left great toe. bunion repair/shaved bone       Family History   Problem Relation Age of Onset    Stroke Father     Heart disease Father     No Known Problems Mother     No Known Problems Brother     No Known Problems Daughter     No Known Problems Daughter     No Known Problems Daughter     Blindness Neg Hx     Cataracts Neg Hx     Diabetes Neg Hx     Glaucoma Neg Hx     Hypertension Neg Hx     Macular degeneration Neg Hx     Retinal detachment Neg Hx     Strabismus Neg Hx     Thyroid disease Neg Hx     Thyroid cancer Neg Hx        Social History     Socioeconomic  History    Marital status:    Occupational History    Occupation:      Comment: shell    Occupation: [employer]     Comment: nelly   Tobacco Use    Smoking status: Never    Smokeless tobacco: Never   Substance and Sexual Activity    Alcohol use: No    Drug use: No    Sexual activity: Not Currently   Social History Narrative    Water aerobics at [employer] 3 days a week.    Retired.    To celebrate 50th wedding celebration.     Social Determinants of Health     Financial Resource Strain: Low Risk     Difficulty of Paying Living Expenses: Not hard at all   Food Insecurity: No Food Insecurity    Worried About Running Out of Food in the Last Year: Never true    Ran Out of Food in the Last Year: Never true   Transportation Needs: No Transportation Needs    Lack of Transportation (Medical): No    Lack of Transportation (Non-Medical): No   Physical Activity: Sufficiently Active    Days of Exercise per Week: 3 days    Minutes of Exercise per Session: 60 min   Stress: No Stress Concern Present    Feeling of Stress : Not at all   Social Connections: Socially Integrated    Frequency of Communication with Friends and Family: More than three times a week    Frequency of Social Gatherings with Friends and Family: Twice a week    Attends Congregation Services: More than 4 times per year    Active Member of Clubs or Organizations: Yes    Attends Club or Organization Meetings: More than 4 times per year    Marital Status:    Housing Stability: Low Risk     Unable to Pay for Housing in the Last Year: No    Number of Places Lived in the Last Year: 1    Unstable Housing in the Last Year: No       MEDICATIONS & ALLERGIES:     Current Outpatient Medications on File Prior to Visit   Medication Sig Dispense Refill    lisinopriL-hydrochlorothiazide (PRINZIDE,ZESTORETIC) 10-12.5 mg per tablet Take 1 tablet by mouth once daily. 90 tablet 3    pravastatin (PRAVACHOL) 40 MG tablet Take 1 tablet (40 mg total) by mouth once  daily. 90 tablet 3     No current facility-administered medications on file prior to visit.        Review of patient's allergies indicates:  No Known Allergies    Medications Reconciliation:   I have reconciled the patient's home medications with the patient/family. I have updated all changes.  Refer to After-Visit Medication List.    OBJECTIVE:     Vital Signs:  There were no vitals filed for this visit.  Wt Readings from Last 3 Encounters:   12/21/22 0856 103.9 kg (229 lb 0.9 oz)   10/20/22 1531 102.4 kg (225 lb 12 oz)   10/13/22 1337 102.7 kg (226 lb 6.6 oz)     There is no height or weight on file to calculate BMI.   Wt Readings from Last 3 Encounters:   03/28/23 103.6 kg (228 lb 6.3 oz)   12/21/22 103.9 kg (229 lb 0.9 oz)   10/20/22 102.4 kg (225 lb 12 oz)     Temp Readings from Last 3 Encounters:   12/14/21 97.5 °F (36.4 °C)   09/11/20 97.8 °F (36.6 °C) (Oral)   05/14/19 97.6 °F (36.4 °C)     BP Readings from Last 3 Encounters:   03/28/23 124/78   12/21/22 124/76   10/20/22 114/74     Pulse Readings from Last 3 Encounters:   03/28/23 76   12/21/22 75   10/20/22 70       Physical Exam:  (Focused Exam)  General: Well developed, well nourished. No distress.  HEENT: Head is normocephalic, atraumatic  Eyes: Clear conjunctiva.  Neck: Supple, symmetrical neck; trachea midline.  Lungs: Clear to auscultation bilaterally and normal respiratory effort.  Cardiovascular: Heart with regular rate and rhythm. No murmurs, gallops or rubs  Extremities: No LE edema. Pulses 2+ and symmetric.   Skin: Skin color, texture, turgor normal. No rashes.  Neurologic: Normal strength and tone. No focal numbness or weakness.     Laboratory  Lab Results   Component Value Date    WBC 7.21 10/20/2022    HGB 12.4 (L) 10/20/2022    HCT 39.8 (L) 10/20/2022     10/20/2022    CHOL 170 10/20/2022    TRIG 164 (H) 10/20/2022    HDL 40 10/20/2022    ALT 12 10/20/2022    AST 19 10/20/2022     10/20/2022    K 4.6 10/20/2022      10/20/2022    CREATININE 1.2 10/20/2022    BUN 15 10/20/2022    CO2 29 10/20/2022    TSH 1.409 10/20/2022    PSA 1.7 06/16/2020       ASSESSMENT & PLAN:     Same day apt    Acute pain of left knee  -     X-Ray Knee 3 View Left; Future; Expected date: 03/28/2023  -     Ambulatory referral/consult to Orthopedics; Future; Expected date: 04/04/2023  -     diclofenac sodium (VOLTAREN) 1 % Gel; Apply 2 g topically once daily.  Dispense: 20 g; Refill: 3  *May benefit from soft knee bracing and PT. Will refer for ortho expert input and further eval / tx mgt.     Future Appointments   Date Time Provider Department Center   3/28/2023 10:00 AM [id number] 485 LB LIMIT [facility]RAY IM Joseph Pratt [facility]   4/3/2023  3:45 PM MONA Millan MD [facility]     ,     Medication List            Accurate as of March 28, 2023  9:52 AM. If you have any questions, ask your nurse or doctor.                START taking these medications      diclofenac sodium 1 % Gel  Commonly known as: VOLTAREN  Apply 2 g topically once daily.  Started by: YUE Randolph            CONTINUE taking these medications      lisinopriL-hydrochlorothiazide 10-12.5 mg per tablet  Commonly known as: PRINZIDE,ZESTORETIC  Take 1 tablet by mouth once daily.     pravastatin 40 MG tablet  Commonly known as: PRAVACHOL  Take 1 tablet (40 mg total) by mouth once daily.               Where to Get Your Medications        These medications were sent to [facility] [address] [place] [address] [address]  [address] [facility] [place] [address]      Phone: [phone]   diclofenac sodium 1 % Gel         Signing Physician:  YUE Randolph

## 2023-03-28 ENCOUNTER — OFFICE VISIT (OUTPATIENT)
Dept: INTERNAL MEDICINE | Facility: CLINIC | Age: 78
End: 2023-03-28
Payer: MEDICARE

## 2023-03-28 ENCOUNTER — HOSPITAL ENCOUNTER (OUTPATIENT)
Dept: RADIOLOGY | Facility: HOSPITAL | Age: 78
Discharge: HOME | End: 2023-03-28
Attending: NURSE PRACTITIONER
Payer: MEDICARE

## 2023-03-28 VITALS
BODY MASS INDEX: 34.61 KG/M2 | HEIGHT: 68 IN | OXYGEN SATURATION: 98 % | WEIGHT: 228.38 LBS | DIASTOLIC BLOOD PRESSURE: 78 MMHG | HEART RATE: 76 BPM | SYSTOLIC BLOOD PRESSURE: 124 MMHG

## 2023-03-28 DIAGNOSIS — M25.562 ACUTE PAIN OF LEFT KNEE: ICD-10-CM

## 2023-03-28 DIAGNOSIS — M25.562 ACUTE PAIN OF LEFT KNEE: Primary | ICD-10-CM

## 2023-03-28 PROCEDURE — 3074F PR MOST RECENT SYSTOLIC BLOOD PRESSURE < 130 MM HG: ICD-10-PCS | Mod: HCNC,CPTII,S$GLB, | Performed by: NURSE PRACTITIONER

## 2023-03-28 PROCEDURE — 1101F PR PT FALLS ASSESS DOC 0-1 FALLS W/OUT INJ PAST YR: ICD-10-PCS | Mod: HCNC,CPTII,S$GLB, | Performed by: NURSE PRACTITIONER

## 2023-03-28 PROCEDURE — 99999 PR PBB SHADOW E&M-EST. PATIENT-LVL III: CPT | Mod: PBBFAC,HCNC,, | Performed by: NURSE PRACTITIONER

## 2023-03-28 PROCEDURE — 1125F AMNT PAIN NOTED PAIN PRSNT: CPT | Mod: HCNC,CPTII,S$GLB, | Performed by: NURSE PRACTITIONER

## 2023-03-28 PROCEDURE — 1159F MED LIST DOCD IN RCRD: CPT | Mod: HCNC,CPTII,S$GLB, | Performed by: NURSE PRACTITIONER

## 2023-03-28 PROCEDURE — 99999 PR PBB SHADOW E&M-EST. PATIENT-LVL III: ICD-10-PCS | Mod: PBBFAC,HCNC,, | Performed by: NURSE PRACTITIONER

## 2023-03-28 PROCEDURE — 3078F DIAST BP <80 MM HG: CPT | Mod: HCNC,CPTII,S$GLB, | Performed by: NURSE PRACTITIONER

## 2023-03-28 PROCEDURE — 73562 X-RAY EXAM OF KNEE 3: CPT | Mod: TC,HCNC,LT

## 2023-03-28 PROCEDURE — 1159F PR MEDICATION LIST DOCUMENTED IN MEDICAL RECORD: ICD-10-PCS | Mod: HCNC,CPTII,S$GLB, | Performed by: NURSE PRACTITIONER

## 2023-03-28 PROCEDURE — 3288F FALL RISK ASSESSMENT DOCD: CPT | Mod: HCNC,CPTII,S$GLB, | Performed by: NURSE PRACTITIONER

## 2023-03-28 PROCEDURE — 1101F PT FALLS ASSESS-DOCD LE1/YR: CPT | Mod: HCNC,CPTII,S$GLB, | Performed by: NURSE PRACTITIONER

## 2023-03-28 PROCEDURE — 1125F PR PAIN SEVERITY QUANTIFIED, PAIN PRESENT: ICD-10-PCS | Mod: HCNC,CPTII,S$GLB, | Performed by: NURSE PRACTITIONER

## 2023-03-28 PROCEDURE — 3074F SYST BP LT 130 MM HG: CPT | Mod: HCNC,CPTII,S$GLB, | Performed by: NURSE PRACTITIONER

## 2023-03-28 PROCEDURE — 73562 X-RAY EXAM OF KNEE 3: CPT | Mod: 26,HCNC,LT, | Performed by: RADIOLOGY

## 2023-03-28 PROCEDURE — 73562 XR KNEE 3 VIEW LEFT: ICD-10-PCS | Mod: 26,HCNC,LT, | Performed by: RADIOLOGY

## 2023-03-28 PROCEDURE — 3288F PR FALLS RISK ASSESSMENT DOCUMENTED: ICD-10-PCS | Mod: HCNC,CPTII,S$GLB, | Performed by: NURSE PRACTITIONER

## 2023-03-28 PROCEDURE — 3078F PR MOST RECENT DIASTOLIC BLOOD PRESSURE < 80 MM HG: ICD-10-PCS | Mod: HCNC,CPTII,S$GLB, | Performed by: NURSE PRACTITIONER

## 2023-03-28 PROCEDURE — 99214 PR OFFICE/OUTPT VISIT, EST, LEVL IV, 30-39 MIN: ICD-10-PCS | Mod: HCNC,S$GLB,, | Performed by: NURSE PRACTITIONER

## 2023-03-28 PROCEDURE — 99214 OFFICE O/P EST MOD 30 MIN: CPT | Mod: HCNC,S$GLB,, | Performed by: NURSE PRACTITIONER

## 2023-03-28 RX ORDER — DICLOFENAC SODIUM 10 MG/G
2 GEL TOPICAL DAILY
Qty: 20 G | Refills: 3 | Status: SHIPPED | OUTPATIENT
Start: 2023-03-28

## 2023-04-03 ENCOUNTER — OFFICE VISIT (OUTPATIENT)
Dept: SPORTS MEDICINE | Facility: CLINIC | Age: 78
End: 2023-04-03
Payer: MEDICARE

## 2023-04-03 VITALS
BODY MASS INDEX: 34.89 KG/M2 | HEIGHT: 68 IN | DIASTOLIC BLOOD PRESSURE: 86 MMHG | SYSTOLIC BLOOD PRESSURE: 136 MMHG | WEIGHT: 230.19 LBS

## 2023-04-03 DIAGNOSIS — M25.562 ACUTE PAIN OF LEFT KNEE: ICD-10-CM

## 2023-04-03 DIAGNOSIS — M22.42 CHONDROMALACIA OF LEFT PATELLA: Primary | ICD-10-CM

## 2023-04-03 PROCEDURE — 3079F PR MOST RECENT DIASTOLIC BLOOD PRESSURE 80-89 MM HG: ICD-10-PCS | Mod: HCNC,CPTII,S$GLB, | Performed by: ORTHOPAEDIC SURGERY

## 2023-04-03 PROCEDURE — 1160F PR REVIEW ALL MEDS BY PRESCRIBER/CLIN PHARMACIST DOCUMENTED: ICD-10-PCS | Mod: HCNC,CPTII,S$GLB, | Performed by: ORTHOPAEDIC SURGERY

## 2023-04-03 PROCEDURE — 99999 PR PBB SHADOW E&M-EST. PATIENT-LVL II: ICD-10-PCS | Mod: PBBFAC,HCNC,, | Performed by: ORTHOPAEDIC SURGERY

## 2023-04-03 PROCEDURE — 99203 OFFICE O/P NEW LOW 30 MIN: CPT | Mod: HCNC,S$GLB,, | Performed by: ORTHOPAEDIC SURGERY

## 2023-04-03 PROCEDURE — 1159F PR MEDICATION LIST DOCUMENTED IN MEDICAL RECORD: ICD-10-PCS | Mod: HCNC,CPTII,S$GLB, | Performed by: ORTHOPAEDIC SURGERY

## 2023-04-03 PROCEDURE — 1160F RVW MEDS BY RX/DR IN RCRD: CPT | Mod: HCNC,CPTII,S$GLB, | Performed by: ORTHOPAEDIC SURGERY

## 2023-04-03 PROCEDURE — 3079F DIAST BP 80-89 MM HG: CPT | Mod: HCNC,CPTII,S$GLB, | Performed by: ORTHOPAEDIC SURGERY

## 2023-04-03 PROCEDURE — 3075F PR MOST RECENT SYSTOLIC BLOOD PRESS GE 130-139MM HG: ICD-10-PCS | Mod: HCNC,CPTII,S$GLB, | Performed by: ORTHOPAEDIC SURGERY

## 2023-04-03 PROCEDURE — 3075F SYST BP GE 130 - 139MM HG: CPT | Mod: HCNC,CPTII,S$GLB, | Performed by: ORTHOPAEDIC SURGERY

## 2023-04-03 PROCEDURE — 99999 PR PBB SHADOW E&M-EST. PATIENT-LVL II: CPT | Mod: PBBFAC,HCNC,, | Performed by: ORTHOPAEDIC SURGERY

## 2023-04-03 PROCEDURE — 99203 PR OFFICE/OUTPT VISIT, NEW, LEVL III, 30-44 MIN: ICD-10-PCS | Mod: HCNC,S$GLB,, | Performed by: ORTHOPAEDIC SURGERY

## 2023-04-03 PROCEDURE — 1159F MED LIST DOCD IN RCRD: CPT | Mod: HCNC,CPTII,S$GLB, | Performed by: ORTHOPAEDIC SURGERY

## 2023-04-03 NOTE — PROGRESS NOTES
CC: Left knee pain    77 y.o. Male who presents as a new patient to me. He is retired. Complaint is left knee pain x 3 weeks with an atraumatic onset. He states that he was using a leg machine at the gym and may have overdone it and then began having some pain the next day. Pain localizes over the medial side of the knee and patella.  Denies swelling or effusions. No prominent mechanical symptoms. Denies instability.  Worse with getting up out of a chair. Better with rest.Treatment thus far has included rest, activity modifications, oral medications and voltaren gel (which has essentially taken away all his symptoms).  Here today to discuss diagnosis and treatment options.      Negative for tobacco.   Negative for diabetes.     REVIEW OF SYSTEMS:   Constitution: Negative. Negative for chills, fever and night sweats.    Hematologic/Lymphatic: Negative for bleeding problem. Does not bruise/bleed easily.   Skin: Negative for dry skin, itching and rash.   Musculoskeletal: Negative for falls. Positive for left knee pain and muscle weakness.     All other review of symptoms were reviewed and found to be noncontributory.     PAST MEDICAL HISTORY:   Past Medical History:   Diagnosis Date    Anemia     Arthritis     Cataract     Hypertension     Hypogonadism male     Obesity     Thyroid nodule     neg bx 2/2011       PAST SURGICAL HISTORY:   Past Surgical History:   Procedure Laterality Date    EYE SURGERY Left     cataract     FRACTURE SURGERY  1970    left great toe. bunion repair/shaved bone       FAMILY HISTORY:   Family History   Problem Relation Age of Onset    Stroke Father     Heart disease Father     No Known Problems Mother     No Known Problems Brother     No Known Problems Daughter     No Known Problems Daughter     No Known Problems Daughter     Blindness Neg Hx     Cataracts Neg Hx     Diabetes Neg Hx     Glaucoma Neg Hx     Hypertension Neg Hx     Macular degeneration Neg Hx     Retinal detachment Neg Hx      Strabismus Neg Hx     Thyroid disease Neg Hx     Thyroid cancer Neg Hx        SOCIAL HISTORY:   Social History     Socioeconomic History    Marital status:    Occupational History    Occupation:      Comment: shell    Occupation: [employer]     Comment: nelly   Tobacco Use    Smoking status: Never    Smokeless tobacco: Never   Substance and Sexual Activity    Alcohol use: No    Drug use: No    Sexual activity: Not Currently   Social History Narrative    Water aerobics at [place] 3 days a week.    Retired.    To celebrate 50th wedding celebration.     Social Determinants of Health     Financial Resource Strain: Low Risk     Difficulty of Paying Living Expenses: Not hard at all   Food Insecurity: No Food Insecurity    Worried About Running Out of Food in the Last Year: Never true    Ran Out of Food in the Last Year: Never true   Transportation Needs: No Transportation Needs    Lack of Transportation (Medical): No    Lack of Transportation (Non-Medical): No   Physical Activity: Sufficiently Active    Days of Exercise per Week: 3 days    Minutes of Exercise per Session: 60 min   Stress: No Stress Concern Present    Feeling of Stress : Not at all   Social Connections: Socially Integrated    Frequency of Communication with Friends and Family: More than three times a week    Frequency of Social Gatherings with Friends and Family: Twice a week    Attends Buddhist Services: More than 4 times per year    Active Member of Clubs or Organizations: Yes    Attends Club or Organization Meetings: More than 4 times per year    Marital Status:    Housing Stability: Low Risk     Unable to Pay for Housing in the Last Year: No    Number of Places Lived in the Last Year: 1    Unstable Housing in the Last Year: No       MEDICATIONS:     Current Outpatient Medications:     diclofenac sodium (VOLTAREN) 1 % Gel, Apply 2 g topically once daily., Disp: 20 g, Rfl: 3    lisinopriL-hydrochlorothiazide  "(PRINZIDE,ZESTORETIC) 10-12.5 mg per tablet, Take 1 tablet by mouth once daily., Disp: 90 tablet, Rfl: 3    pravastatin (PRAVACHOL) 40 MG tablet, Take 1 tablet (40 mg total) by mouth once daily., Disp: 90 tablet, Rfl: 3    ALLERGIES:   Review of patient's allergies indicates:  No Known Allergies     PHYSICAL EXAMINATION:  /86   Ht 5' 7.99" (1.727 m)   Wt 104.4 kg (230 lb 2.6 oz)   BMI 35.00 kg/m²   General: Well-developed well-nourished 77 y.o. malein no acute distress   Cardiovascular: Regular rhythm by palpation of distal pulse, normal color and temperature, no concerning varicosities on symptomatic side   Lungs: No labored breathing or wheezing appreciated   Neuro: Alert and oriented ×3   Psychiatric: well oriented to person, place and time, demonstrates normal mood and affect   Skin: No rashes, lesions or ulcers, normal temperature, turgor, and texture on involved extremity    Ortho/SPM Exam  Examination of the left knee demonstrates intact extensor mechanism. No effusion or prepatellar swelling. Central patellar tracking. No patellar apprehension. Decreased patellar mobility. +Patellar crepitus. Negative patellar grind. Full extension. Flexion to 130. No pain with forced flexion or extension. No prominent tenderness along the medial and lateral joint line. Negative Mayra's. Negative Lachman. Stable to varus/valgus stress testing at 0 and 30 deg. Ligamentously stable.      IMAGING:  X-rays including  AP LEFT knee lateral and sunrise views/images reviewed by me show:    No acute fractures. There is no obvious tibiofemoral joint space narrowing on these non weight bearing views. There may be mild degenerative changes noted at the patellofemoral joint. There is some proximal tibiofibular joint space degeneration. Soft tissues are normal    ASSESSMENT:      ICD-10-CM ICD-9-CM   1. Chondromalacia of left patella  M22.42 717.7   2. Acute pain of left knee  M25.562 719.46     PLAN:     -Findings and " treatment options were discussed with the patient  -Pain is essentially absent after a course of voltaren gel. He can continue with these modalities and no further treatment is needed today.   -RTC PRN if symptoms return for further treatment   -All questions answered    Procedures

## 2023-04-21 ENCOUNTER — PATIENT MESSAGE (OUTPATIENT)
Dept: ADMINISTRATIVE | Facility: HOSPITAL | Age: 78
End: 2023-04-21
Payer: MEDICARE

## 2023-08-08 ENCOUNTER — OFFICE VISIT (OUTPATIENT)
Dept: OPTOMETRY | Facility: CLINIC | Age: 78
End: 2023-08-08
Payer: MEDICARE

## 2023-08-08 DIAGNOSIS — H40.013 OAG (OPEN ANGLE GLAUCOMA) SUSPECT, LOW RISK, BILATERAL: ICD-10-CM

## 2023-08-08 DIAGNOSIS — H52.13 MYOPIA WITH ASTIGMATISM AND PRESBYOPIA, BILATERAL: ICD-10-CM

## 2023-08-08 DIAGNOSIS — Z96.1 PSEUDOPHAKIA: ICD-10-CM

## 2023-08-08 DIAGNOSIS — H52.203 MYOPIA WITH ASTIGMATISM AND PRESBYOPIA, BILATERAL: ICD-10-CM

## 2023-08-08 DIAGNOSIS — H35.342 MACULAR HOLE OF LEFT EYE: ICD-10-CM

## 2023-08-08 DIAGNOSIS — H25.11 NUCLEAR SCLEROSIS, RIGHT: Primary | ICD-10-CM

## 2023-08-08 DIAGNOSIS — H52.4 MYOPIA WITH ASTIGMATISM AND PRESBYOPIA, BILATERAL: ICD-10-CM

## 2023-08-08 PROCEDURE — 99999 PR PBB SHADOW E&M-EST. PATIENT-LVL III: ICD-10-PCS | Mod: PBBFAC,HCNC,, | Performed by: OPTOMETRIST

## 2023-08-08 PROCEDURE — 92014 PR EYE EXAM, EST PATIENT,COMPREHESV: ICD-10-PCS | Mod: HCNC,S$GLB,, | Performed by: OPTOMETRIST

## 2023-08-08 PROCEDURE — 99999 PR PBB SHADOW E&M-EST. PATIENT-LVL III: CPT | Mod: PBBFAC,HCNC,, | Performed by: OPTOMETRIST

## 2023-08-08 PROCEDURE — 3288F PR FALLS RISK ASSESSMENT DOCUMENTED: ICD-10-PCS | Mod: HCNC,CPTII,S$GLB, | Performed by: OPTOMETRIST

## 2023-08-08 PROCEDURE — 92014 COMPRE OPH EXAM EST PT 1/>: CPT | Mod: HCNC,S$GLB,, | Performed by: OPTOMETRIST

## 2023-08-08 PROCEDURE — 1126F AMNT PAIN NOTED NONE PRSNT: CPT | Mod: HCNC,CPTII,S$GLB, | Performed by: OPTOMETRIST

## 2023-08-08 PROCEDURE — 1160F PR REVIEW ALL MEDS BY PRESCRIBER/CLIN PHARMACIST DOCUMENTED: ICD-10-PCS | Mod: HCNC,CPTII,S$GLB, | Performed by: OPTOMETRIST

## 2023-08-08 PROCEDURE — 1101F PR PT FALLS ASSESS DOC 0-1 FALLS W/OUT INJ PAST YR: ICD-10-PCS | Mod: HCNC,CPTII,S$GLB, | Performed by: OPTOMETRIST

## 2023-08-08 PROCEDURE — 92250 COLOR FUNDUS PHOTOGRAPHY - OU - BOTH EYES: ICD-10-PCS | Mod: HCNC,S$GLB,, | Performed by: OPTOMETRIST

## 2023-08-08 PROCEDURE — 1160F RVW MEDS BY RX/DR IN RCRD: CPT | Mod: HCNC,CPTII,S$GLB, | Performed by: OPTOMETRIST

## 2023-08-08 PROCEDURE — 92250 FUNDUS PHOTOGRAPHY W/I&R: CPT | Mod: HCNC,S$GLB,, | Performed by: OPTOMETRIST

## 2023-08-08 PROCEDURE — 1126F PR PAIN SEVERITY QUANTIFIED, NO PAIN PRESENT: ICD-10-PCS | Mod: HCNC,CPTII,S$GLB, | Performed by: OPTOMETRIST

## 2023-08-08 PROCEDURE — 1101F PT FALLS ASSESS-DOCD LE1/YR: CPT | Mod: HCNC,CPTII,S$GLB, | Performed by: OPTOMETRIST

## 2023-08-08 PROCEDURE — 1159F PR MEDICATION LIST DOCUMENTED IN MEDICAL RECORD: ICD-10-PCS | Mod: HCNC,CPTII,S$GLB, | Performed by: OPTOMETRIST

## 2023-08-08 PROCEDURE — 92015 PR REFRACTION: ICD-10-PCS | Mod: HCNC,S$GLB,, | Performed by: OPTOMETRIST

## 2023-08-08 PROCEDURE — 92015 DETERMINE REFRACTIVE STATE: CPT | Mod: HCNC,S$GLB,, | Performed by: OPTOMETRIST

## 2023-08-08 PROCEDURE — 3288F FALL RISK ASSESSMENT DOCD: CPT | Mod: HCNC,CPTII,S$GLB, | Performed by: OPTOMETRIST

## 2023-08-08 PROCEDURE — 1159F MED LIST DOCD IN RCRD: CPT | Mod: HCNC,CPTII,S$GLB, | Performed by: OPTOMETRIST

## 2023-08-09 NOTE — PROGRESS NOTES
[place]    MONALISA: 06/22  Chief complaint (CC): patient is here for annual ey exam today. Patient   hasn't noticed any vision changes since the last exam.  Glasses are   distance only and still seem fine.  Glasses? + 1 yr. old  Contacts? -  H/o eye surgery, injections or laser: PC IOL OS with macular hole repair   H/o eye injury: -  Known eye conditions? seeabove  Family h/o eye conditions? -  Eye gtts? -      (-) Flashes (+)  Floaters (-) Mucous   (-)  Tearing (-) Itching (-) Burning   (-) Headaches (-) Eye Pain/discomfort (-) Irritation   (-)  Redness (-) Double vision (-) Blurry vision    Diabetic? -  A1c? -      Last edited by Erkia Arteaga on 8/8/2023  1:32 PM.            Assessment /Plan     For exam results, see Encounter Report.      Nuclear sclerosis, right  Nuclear sclerotic cataract - not visually significant. Observe.    Myopia with astigmatism and presbyopia, bilateral  SRx released to patient. Patient educated on lens options. Normal ocular health. RTC 1 year for routine exam.     Pseudophakia  Good result.     OAG (open angle glaucoma) suspect, low risk, bilateral  -     Posterior Segment OCT Optic Nerve- Both eyes; Future  -     Antonio Visual Field - OU - Extended - Both Eyes; Future  -     Color Fundus Photography - OU - Both Eyes  (-) FHx. IOP 20 OD, 23, 33 OS. Last 18 OD, OS. C/d 0.6 OD, 0.7 OS.   6/8/20/22 OD unable, OS borderline NI and TI.   Educated pt on findings w/understanding.   RTC 1-2 mo IOP/pachy/OCT/24-2 SS HVF    Macular hole of left eye  Stable. Repair w/Dr Gallagher

## 2023-10-18 ENCOUNTER — TELEPHONE (OUTPATIENT)
Dept: OPTOMETRY | Facility: CLINIC | Age: 78
End: 2023-10-18
Payer: MEDICARE

## 2023-10-18 ENCOUNTER — CLINICAL SUPPORT (OUTPATIENT)
Dept: OPHTHALMOLOGY | Facility: CLINIC | Age: 78
End: 2023-10-18
Payer: MEDICARE

## 2023-10-18 ENCOUNTER — OFFICE VISIT (OUTPATIENT)
Dept: OPTOMETRY | Facility: CLINIC | Age: 78
End: 2023-10-18
Payer: MEDICARE

## 2023-10-18 DIAGNOSIS — H40.052 OCULAR HYPERTENSION OF LEFT EYE: ICD-10-CM

## 2023-10-18 DIAGNOSIS — H40.013 OAG (OPEN ANGLE GLAUCOMA) SUSPECT, LOW RISK, BILATERAL: ICD-10-CM

## 2023-10-18 DIAGNOSIS — H40.011 OAG (OPEN ANGLE GLAUCOMA) SUSPECT, LOW RISK, RIGHT: Primary | ICD-10-CM

## 2023-10-18 PROCEDURE — 1126F PR PAIN SEVERITY QUANTIFIED, NO PAIN PRESENT: ICD-10-PCS | Mod: HCNC,CPTII,S$GLB, | Performed by: OPTOMETRIST

## 2023-10-18 PROCEDURE — 99999 PR PBB SHADOW E&M-EST. PATIENT-LVL II: ICD-10-PCS | Mod: PBBFAC,HCNC,, | Performed by: OPTOMETRIST

## 2023-10-18 PROCEDURE — 99214 OFFICE O/P EST MOD 30 MIN: CPT | Mod: HCNC,S$GLB,, | Performed by: OPTOMETRIST

## 2023-10-18 PROCEDURE — 1101F PR PT FALLS ASSESS DOC 0-1 FALLS W/OUT INJ PAST YR: ICD-10-PCS | Mod: HCNC,CPTII,S$GLB, | Performed by: OPTOMETRIST

## 2023-10-18 PROCEDURE — 3288F PR FALLS RISK ASSESSMENT DOCUMENTED: ICD-10-PCS | Mod: HCNC,CPTII,S$GLB, | Performed by: OPTOMETRIST

## 2023-10-18 PROCEDURE — 76514 ECHO EXAM OF EYE THICKNESS: CPT | Mod: HCNC,S$GLB,, | Performed by: OPTOMETRIST

## 2023-10-18 PROCEDURE — 1159F MED LIST DOCD IN RCRD: CPT | Mod: HCNC,CPTII,S$GLB, | Performed by: OPTOMETRIST

## 2023-10-18 PROCEDURE — 1160F PR REVIEW ALL MEDS BY PRESCRIBER/CLIN PHARMACIST DOCUMENTED: ICD-10-PCS | Mod: HCNC,CPTII,S$GLB, | Performed by: OPTOMETRIST

## 2023-10-18 PROCEDURE — 3288F FALL RISK ASSESSMENT DOCD: CPT | Mod: HCNC,CPTII,S$GLB, | Performed by: OPTOMETRIST

## 2023-10-18 PROCEDURE — 1101F PT FALLS ASSESS-DOCD LE1/YR: CPT | Mod: HCNC,CPTII,S$GLB, | Performed by: OPTOMETRIST

## 2023-10-18 PROCEDURE — 99214 PR OFFICE/OUTPT VISIT, EST, LEVL IV, 30-39 MIN: ICD-10-PCS | Mod: HCNC,S$GLB,, | Performed by: OPTOMETRIST

## 2023-10-18 PROCEDURE — 76514 PR  US, EYE, FOR CORNEAL THICKNESS: ICD-10-PCS | Mod: HCNC,S$GLB,, | Performed by: OPTOMETRIST

## 2023-10-18 PROCEDURE — 1160F RVW MEDS BY RX/DR IN RCRD: CPT | Mod: HCNC,CPTII,S$GLB, | Performed by: OPTOMETRIST

## 2023-10-18 PROCEDURE — 99999 PR PBB SHADOW E&M-EST. PATIENT-LVL II: CPT | Mod: PBBFAC,HCNC,, | Performed by: OPTOMETRIST

## 2023-10-18 PROCEDURE — 1159F PR MEDICATION LIST DOCUMENTED IN MEDICAL RECORD: ICD-10-PCS | Mod: HCNC,CPTII,S$GLB, | Performed by: OPTOMETRIST

## 2023-10-18 PROCEDURE — 1126F AMNT PAIN NOTED NONE PRSNT: CPT | Mod: HCNC,CPTII,S$GLB, | Performed by: OPTOMETRIST

## 2023-10-18 RX ORDER — LATANOPROST 50 UG/ML
1 SOLUTION/ DROPS OPHTHALMIC NIGHTLY
Qty: 2.5 ML | Refills: 11 | Status: SHIPPED | OUTPATIENT
Start: 2023-10-18 | End: 2024-10-17

## 2023-10-18 NOTE — PROGRESS NOTES
OCT/HVF done ou./ Rel/fix/fair od poor os coop. Good ou./ chart checked for latex allergy./ -4.75 + 1.00 x 125/od -1.25 + 1.00 x 60/os-[facility]

## 2023-10-18 NOTE — PROGRESS NOTES
HPI    MONALISA: 08/23  Chief complaint (CC): Patient is here for an HVF,OCT,pachymetry and IOP   check today.  Patient hasn't noticed any vision changes since the last   exam.  Glasses? +  Contacts? -  H/o eye surgery, injections or laser: PC IOL with macular hole repair OS  H/o eye injury: -  Known eye conditions? See above  Family h/o eye conditions? -  Eye gtts? -      (-) Flashes (-)  Floaters (-) Mucous   (-)  Tearing (-) Itching (-) Burning   (-) Headaches (-) Eye Pain/discomfort (-) Irritation   (-)  Redness (-) Double vision (-) Blurry vision    Diabetic? -  A1c? -      Last edited by Erika Arteaga on 10/18/2023  9:30 AM.            Assessment /Plan     For exam results, see Encounter Report.    OAG (open angle glaucoma) suspect, low risk, right  -     latanoprost 0.005 % ophthalmic solution; Place 1 drop into the left eye every evening.  Dispense: 2.5 mL; Refill: 11    Ocular hypertension of left eye  -     latanoprost 0.005 % ophthalmic solution; Place 1 drop into the left eye every evening.  Dispense: 2.5 mL; Refill: 11      (-) FHx. IOP 17 OD, 30 OS. Last 20 OD, 23, 33 OS. Tmax 21 OD, 38 OS. C/d 0.6 OD, 0.7 OS. Pachy  534 OD, 537 OS  6/8/20/22 OD unable, OS borderline NI and TI.   10/18/2022 OD T unable, thin TI, borderline NI, OS borderline NI  10/18/2022 HVF OD low reliability, non specific defect, OS low reliability, WNL  1st time saravanan  Educated pt on findings w/understanding.   No e/o glaucoma. Large c/d, High IOP and thin Pachy.  Start Latanoprost QHs OS  RTC 6 weeks IOP OCT (repeat OCT to see if reading can occur for T OD)

## 2023-10-18 NOTE — TELEPHONE ENCOUNTER
----- Message from Erika Arteaga sent at 10/18/2023 11:05 AM CDT -----  Contact: [phone]    ----- Message -----  From: Kin Pierce  Sent: 10/18/2023  10:59 AM CDT  To: [facility] Staff    Pt is returning a call he states is about scheduling his next appt. Please call back to further assist.

## 2023-10-23 ENCOUNTER — TELEPHONE (OUTPATIENT)
Dept: OPTOMETRY | Facility: CLINIC | Age: 78
End: 2023-10-23
Payer: MEDICARE

## 2023-10-23 ENCOUNTER — NURSE TRIAGE (OUTPATIENT)
Dept: ADMINISTRATIVE | Facility: CLINIC | Age: 78
End: 2023-10-23
Payer: MEDICARE

## 2023-10-23 NOTE — TELEPHONE ENCOUNTER
Pt called in stating that he has been using eye drops for glaucoma. States has been on since Friday. States on Saturday eye is red and tearing.  has photophobia and eye pain today. Current pain 2/10 after Tylenol.  feels like something is in his eye. Care advice per protocol.  will go to ED now for eval.   Advised to call back with concerns or worsening symptoms. Verbalized understanding.   Reason for Disposition   Foreign body sensation ('feels like something is in there') and irrigation didn't help    Additional Information   Negative: SEVERE eye pain   Negative: Complete loss of vision in one or both eyes   Negative: Eyelids are very swollen (shut or almost) and fever   Negative: Eyelid (outer) is very red and fever    Protocols used: Eye Pain and Other Symptoms-A-[place]

## 2023-10-23 NOTE — TELEPHONE ENCOUNTER
----- Message from Fabiana Cronin sent at 10/23/2023  3:32 PM CDT -----  Regarding: Requesting Call Back  Patients wife called in regards to pt's symptoms while using eye drops. Patient has sensitively to light and eye pain that started yesterday.     Call back- [phone]

## 2023-10-24 ENCOUNTER — OFFICE VISIT (OUTPATIENT)
Dept: OPTOMETRY | Facility: CLINIC | Age: 78
End: 2023-10-24
Payer: MEDICARE

## 2023-10-24 DIAGNOSIS — H40.011 OAG (OPEN ANGLE GLAUCOMA) SUSPECT, LOW RISK, RIGHT: Primary | ICD-10-CM

## 2023-10-24 DIAGNOSIS — H40.052 OCULAR HYPERTENSION OF LEFT EYE: ICD-10-CM

## 2023-10-24 DIAGNOSIS — H04.123 DRY EYE SYNDROME OF BOTH EYES: ICD-10-CM

## 2023-10-24 PROCEDURE — 3288F FALL RISK ASSESSMENT DOCD: CPT | Mod: HCNC,CPTII,S$GLB, | Performed by: OPTOMETRIST

## 2023-10-24 PROCEDURE — 99999 PR PBB SHADOW E&M-EST. PATIENT-LVL II: CPT | Mod: PBBFAC,HCNC,, | Performed by: OPTOMETRIST

## 2023-10-24 PROCEDURE — 1159F MED LIST DOCD IN RCRD: CPT | Mod: HCNC,CPTII,S$GLB, | Performed by: OPTOMETRIST

## 2023-10-24 PROCEDURE — 1126F AMNT PAIN NOTED NONE PRSNT: CPT | Mod: HCNC,CPTII,S$GLB, | Performed by: OPTOMETRIST

## 2023-10-24 PROCEDURE — 1101F PR PT FALLS ASSESS DOC 0-1 FALLS W/OUT INJ PAST YR: ICD-10-PCS | Mod: HCNC,CPTII,S$GLB, | Performed by: OPTOMETRIST

## 2023-10-24 PROCEDURE — 99999 PR PBB SHADOW E&M-EST. PATIENT-LVL II: ICD-10-PCS | Mod: PBBFAC,HCNC,, | Performed by: OPTOMETRIST

## 2023-10-24 PROCEDURE — 1160F RVW MEDS BY RX/DR IN RCRD: CPT | Mod: HCNC,CPTII,S$GLB, | Performed by: OPTOMETRIST

## 2023-10-24 PROCEDURE — 1126F PR PAIN SEVERITY QUANTIFIED, NO PAIN PRESENT: ICD-10-PCS | Mod: HCNC,CPTII,S$GLB, | Performed by: OPTOMETRIST

## 2023-10-24 PROCEDURE — 1159F PR MEDICATION LIST DOCUMENTED IN MEDICAL RECORD: ICD-10-PCS | Mod: HCNC,CPTII,S$GLB, | Performed by: OPTOMETRIST

## 2023-10-24 PROCEDURE — 99214 PR OFFICE/OUTPT VISIT, EST, LEVL IV, 30-39 MIN: ICD-10-PCS | Mod: HCNC,S$GLB,, | Performed by: OPTOMETRIST

## 2023-10-24 PROCEDURE — 99214 OFFICE O/P EST MOD 30 MIN: CPT | Mod: HCNC,S$GLB,, | Performed by: OPTOMETRIST

## 2023-10-24 PROCEDURE — 3288F PR FALLS RISK ASSESSMENT DOCUMENTED: ICD-10-PCS | Mod: HCNC,CPTII,S$GLB, | Performed by: OPTOMETRIST

## 2023-10-24 PROCEDURE — 1101F PT FALLS ASSESS-DOCD LE1/YR: CPT | Mod: HCNC,CPTII,S$GLB, | Performed by: OPTOMETRIST

## 2023-10-24 PROCEDURE — 1160F PR REVIEW ALL MEDS BY PRESCRIBER/CLIN PHARMACIST DOCUMENTED: ICD-10-PCS | Mod: HCNC,CPTII,S$GLB, | Performed by: OPTOMETRIST

## 2023-10-24 NOTE — PROGRESS NOTES
[facility] Note    Chief Complaint      Chief Complaint   Patient presents with    Medicare AWV         History of Present Illness      Geovani Evans Jr. is a 78 y.o. male who presents today for   Chief Complaint   Patient presents with    Medicare AWV           Patient presents to clinic today for his annual wellness visit required by Medicare. He reports feeling well today.  There are no complaints today.       Review of Systems   All 12 systems otherwise negative.     Family History:  family history includes Heart disease in his father; No Known Problems in his brother, daughter, daughter, daughter, and mother; Stroke in his father.   Family history was reviewed with patient.     Medications:  Outpatient Encounter Medications as of 11/14/2023   Medication Sig Dispense Refill    diclofenac sodium (VOLTAREN) 1 % Gel Apply 2 g topically once daily. 20 g 3    latanoprost 0.005 % ophthalmic solution Place 1 drop into the left eye every evening. 2.5 mL 11    pravastatin (PRAVACHOL) 40 MG tablet Take 1 tablet (40 mg total) by mouth once daily. 90 tablet 3    lisinopriL-hydrochlorothiazide (PRINZIDE,ZESTORETIC) 10-12.5 mg per tablet Take 1 tablet by mouth once daily. 90 tablet 3     No facility-administered encounter medications on file as of 11/14/2023.     Review for Opioid Screening: Pt does not have Rx for Opioids    Review for Substance Use Disorders: Patient does not have a controlled substance abuse disorder.        Allergies:  Review of patient's allergies indicates:  No Known Allergies    Health Maintenance:  Health Maintenance   Topic Date Due    Lipid Panel  10/20/2023    TETANUS VACCINE  11/14/2024 (Originally 11/4/2023)    Shingles Vaccine (1 of 2) 11/14/2024 (Originally 10/24/1997)    Hepatitis C Screening  Completed     The patient has no Health Maintenance topics of status Not Due      Physical Exam      Vital Signs  Pulse: 68  Resp: 18  SpO2: 98 %  BP: 132/72  BP Location:  "Right arm  Patient Position: Sitting  Height and Weight  Height: 5' 8" (172.7 cm)  Weight: 103.6 kg (228 lb 6.3 oz)  BSA (Calculated - sq m): 2.23 sq meters  BMI (Calculated): 34.7  Weight in (lb) to have BMI = 25: 164.1]    Physical Exam  Vitals reviewed.   Constitutional:       Appearance: Normal appearance. He is normal weight.   HENT:      Head: Normocephalic and atraumatic.      Nose: Nose normal.      Mouth/Throat:      Mouth: Mucous membranes are moist.      Pharynx: Oropharynx is clear.   Eyes:      Extraocular Movements: Extraocular movements intact.      Conjunctiva/sclera: Conjunctivae normal.      Pupils: Pupils are equal, round, and reactive to light.   Cardiovascular:      Rate and Rhythm: Normal rate and regular rhythm.      Pulses: Normal pulses.      Heart sounds: Normal heart sounds.   Pulmonary:      Effort: Pulmonary effort is normal.      Breath sounds: Normal breath sounds.   Musculoskeletal:         General: Normal range of motion.      Cervical back: Normal range of motion and neck supple.   Skin:     General: Skin is warm and dry.      Capillary Refill: Capillary refill takes less than 2 seconds.   Neurological:      General: No focal deficit present.      Mental Status: He is alert and oriented to person, place, and time. Mental status is at baseline.   Psychiatric:         Mood and Affect: Mood normal.         Behavior: Behavior normal.         Thought Content: Thought content normal.         Judgment: Judgment normal.          Assessment/Plan     Geovani Evans Jr. is a 78 y.o.male with:    Weight loss, abnormal    Encounter for Medicare annual wellness exam  -     Ambulatory Referral/Consult to Enhanced Annual Wellness Visit (eAWV)    Encounter for preventive health examination      As above, continue current medications and maintain follow up with specialists.  Return to clinic as needed.    Greater than 50% of visit was spent face to face with patient.  All questions were answered to " patient's satisfaction.           Gabriela Somers, NP-C  [facility]    I offered to discuss advanced care planning, including how to pick a person who would make decisions for you if you were unable to make them for yourself, called a health care power of , and what kind of decisions you might make such as use of life sustaining treatments such as ventilators and tube feeding when faced with a life limiting illness recorded on a living will that they will need to know. (How you want to be cared for as you near the end of your natural life)     X Patient is interested in learning more about how to make advanced directives.  I provided them paperwork and offered to discuss this with them.  I offered to discuss advanced care planning, including how to pick a person who would make decisions for you if you were unable to make them for yourself, called a health care power of , and what kind of decisions you might make such as use of life sustaining treatments such as ventilators and tube feeding when faced with a life limiting illness recorded on a living will that they will need to know. (How you want to be cared for as you near the end of your natural life)     X Patient is interested in learning more about how to make advanced directives.  I provided them paperwork and offered to discuss this with them.

## 2023-10-24 NOTE — PROGRESS NOTES
HPI    MONALISA: 10/23  Chief complaint (CC): Patient started latanoprost on Friday. Saturday he   woke up with redness and it stayed red. Monday he had redness and eye pain   an d tylenol relieved the pain.  Patient has continued to use the drops   and his symptoms were better last night. This morning everything is back   to normal.  Glasses? +  Contacts? -  H/o eye surgery, injections or laser: PC IOL with macular hole repair OS  H/o eye injury: -  Known eye conditions? See above  Family h/o eye conditions? -  Eye gtts? Using Latanoprost OS Q HS, last used last night      (-) Flashes (-)  Floaters (-) Mucous   (-)  Tearing (-) Itching (-) Burning   (-) Headaches (-) Eye Pain/discomfort (-) Irritation   (-)  Redness (-) Double vision (-) Blurry vision    Diabetic? -  A1c? -      Last edited by Erika Arteaga on 10/24/2023 10:16 AM.            Assessment /Plan     For exam results, see Encounter Report.      OAG (open angle glaucoma) suspect, low risk, right  Ocular hypertension of left eye  (-) FHx. IOP 20 OD, OS. Last 17 OD, 30 OS.  Tmax 21 OD, 38 OS. C/d 0.6 OD, 0.7 OS. Pachy  534 OD, 537 OS  6/8/20/22 OD unable, OS borderline NI and TI.   10/18/2022 OD T unable, thin TI, borderline NI, OS borderline NI  10/18/2022 HVF OD low reliability, non specific defect, OS low reliability, WNL  1st time saravanan  Educated pt on findings w/understanding.   No e/o glaucoma. Large c/d, High IOP and thin Pachy.  Start Latanoprost QHs OS  RTC 6 weeks IOP OCT (repeat OCT to see if reading can occur for T OD)    Dry eye syndrome of both eyes  Recommend Systane Ultra or Refresh Optive BID-TID OU to aid with symptoms of dry eyes.

## 2023-11-14 ENCOUNTER — OFFICE VISIT (OUTPATIENT)
Dept: PRIMARY CARE CLINIC | Facility: CLINIC | Age: 78
End: 2023-11-14
Payer: MEDICARE

## 2023-11-14 VITALS
HEART RATE: 68 BPM | RESPIRATION RATE: 18 BRPM | BODY MASS INDEX: 34.61 KG/M2 | DIASTOLIC BLOOD PRESSURE: 72 MMHG | HEIGHT: 68 IN | SYSTOLIC BLOOD PRESSURE: 132 MMHG | WEIGHT: 228.38 LBS | OXYGEN SATURATION: 98 %

## 2023-11-14 DIAGNOSIS — R63.4 WEIGHT LOSS, ABNORMAL: Primary | ICD-10-CM

## 2023-11-14 DIAGNOSIS — Z00.00 ENCOUNTER FOR MEDICARE ANNUAL WELLNESS EXAM: ICD-10-CM

## 2023-11-14 DIAGNOSIS — Z00.00 ENCOUNTER FOR PREVENTIVE HEALTH EXAMINATION: ICD-10-CM

## 2023-11-14 PROCEDURE — 3075F SYST BP GE 130 - 139MM HG: CPT | Mod: HCNC,CPTII,S$GLB, | Performed by: NURSE PRACTITIONER

## 2023-11-14 PROCEDURE — 1159F PR MEDICATION LIST DOCUMENTED IN MEDICAL RECORD: ICD-10-PCS | Mod: HCNC,CPTII,S$GLB, | Performed by: NURSE PRACTITIONER

## 2023-11-14 PROCEDURE — 1160F RVW MEDS BY RX/DR IN RCRD: CPT | Mod: HCNC,CPTII,S$GLB, | Performed by: NURSE PRACTITIONER

## 2023-11-14 PROCEDURE — 1170F PR FUNCTIONAL STATUS ASSESSED: ICD-10-PCS | Mod: HCNC,CPTII,S$GLB, | Performed by: NURSE PRACTITIONER

## 2023-11-14 PROCEDURE — 1160F PR REVIEW ALL MEDS BY PRESCRIBER/CLIN PHARMACIST DOCUMENTED: ICD-10-PCS | Mod: HCNC,CPTII,S$GLB, | Performed by: NURSE PRACTITIONER

## 2023-11-14 PROCEDURE — 1101F PT FALLS ASSESS-DOCD LE1/YR: CPT | Mod: HCNC,CPTII,S$GLB, | Performed by: NURSE PRACTITIONER

## 2023-11-14 PROCEDURE — 3078F DIAST BP <80 MM HG: CPT | Mod: HCNC,CPTII,S$GLB, | Performed by: NURSE PRACTITIONER

## 2023-11-14 PROCEDURE — 3288F PR FALLS RISK ASSESSMENT DOCUMENTED: ICD-10-PCS | Mod: HCNC,CPTII,S$GLB, | Performed by: NURSE PRACTITIONER

## 2023-11-14 PROCEDURE — G0439 PR MEDICARE ANNUAL WELLNESS SUBSEQUENT VISIT: ICD-10-PCS | Mod: HCNC,S$GLB,, | Performed by: NURSE PRACTITIONER

## 2023-11-14 PROCEDURE — 1159F MED LIST DOCD IN RCRD: CPT | Mod: HCNC,CPTII,S$GLB, | Performed by: NURSE PRACTITIONER

## 2023-11-14 PROCEDURE — 3075F PR MOST RECENT SYSTOLIC BLOOD PRESS GE 130-139MM HG: ICD-10-PCS | Mod: HCNC,CPTII,S$GLB, | Performed by: NURSE PRACTITIONER

## 2023-11-14 PROCEDURE — 3078F PR MOST RECENT DIASTOLIC BLOOD PRESSURE < 80 MM HG: ICD-10-PCS | Mod: HCNC,CPTII,S$GLB, | Performed by: NURSE PRACTITIONER

## 2023-11-14 PROCEDURE — 1170F FXNL STATUS ASSESSED: CPT | Mod: HCNC,CPTII,S$GLB, | Performed by: NURSE PRACTITIONER

## 2023-11-14 PROCEDURE — 1101F PR PT FALLS ASSESS DOC 0-1 FALLS W/OUT INJ PAST YR: ICD-10-PCS | Mod: HCNC,CPTII,S$GLB, | Performed by: NURSE PRACTITIONER

## 2023-11-14 PROCEDURE — 3288F FALL RISK ASSESSMENT DOCD: CPT | Mod: HCNC,CPTII,S$GLB, | Performed by: NURSE PRACTITIONER

## 2023-11-14 PROCEDURE — G0439 PPPS, SUBSEQ VISIT: HCPCS | Mod: HCNC,S$GLB,, | Performed by: NURSE PRACTITIONER

## 2023-11-14 PROCEDURE — 99999 PR PBB SHADOW E&M-EST. PATIENT-LVL IV: ICD-10-PCS | Mod: PBBFAC,HCNC,, | Performed by: NURSE PRACTITIONER

## 2023-11-14 PROCEDURE — 99999 PR PBB SHADOW E&M-EST. PATIENT-LVL IV: CPT | Mod: PBBFAC,HCNC,, | Performed by: NURSE PRACTITIONER

## 2023-11-14 NOTE — PATIENT INSTRUCTIONS
Counseling and Referral of Other Preventative  (Italic type indicates deductible and co-insurance are waived)    Patient Name: Geovani Evans  Today's Date: 11/14/2023    Health Maintenance       Date Due Completion Date    COVID-19 Vaccine (4 - 2023-24 season) 09/01/2023 1/5/2022    Lipid Panel 10/20/2023 10/20/2022    Influenza Vaccine (1) 11/14/2023 (Originally 9/1/2023) 9/9/2020    RSV Vaccine (Age 60+) (1 - 1-dose 60+ series) 11/14/2023 (Originally 10/24/2007) ---    TETANUS VACCINE 11/14/2024 (Originally 11/4/2023) 11/4/2013    Shingles Vaccine (1 of 2) 11/14/2024 (Originally 10/24/1997) ---        No orders of the defined types were placed in this encounter.      The following information is provided to all patients.  This information is to help you find resources for any of the problems found today that may be affecting your health:                Living healthy guide: www.[facility].[place].[facility]      Understanding Diabetes: www.diabetes.org      Eating healthy: www.cdc.gov/healthyweight      CDC home safety checklist: www.cdc.gov/steadi/patient.html      Agency on Aging: www.goea.[place].gov      Alcoholics anonymous (AA): www.aa.org      Physical Activity: www.sophie.nih.gov/pm5prwi      Tobacco use: www.quitwithusla.org     Counseling and Referral of Other Preventative  (Italic type indicates deductible and co-insurance are waived)    Patient Name: Geovani Evans  Today's Date: 11/14/2023    Health Maintenance       Date Due Completion Date    COVID-19 Vaccine (4 - 2023-24 season) 09/01/2023 1/5/2022    Lipid Panel 10/20/2023 10/20/2022    Influenza Vaccine (1) 11/14/2023 (Originally 9/1/2023) 9/9/2020    RSV Vaccine (Age 60+) (1 - 1-dose 60+ series) 11/14/2023 (Originally 10/24/2007) ---    TETANUS VACCINE 11/14/2024 (Originally 11/4/2023) 11/4/2013    Shingles Vaccine (1 of 2) 11/14/2024 (Originally 10/24/1997) ---        No orders of the defined types were placed in this encounter.      The following  information is provided to all patients.  This information is to help you find resources for any of the problems found today that may be affecting your health:                Living healthy guide: www.[facility].[place]      Understanding Diabetes: www.diabetes.org      Eating healthy: www.cdc.gov/healthyweight      CDC home safety checklist: www.cdc.gov/steadi/patient.html      Agency on Aging: www.goea.[place]      Alcoholics anonymous (AA): www.aa.org      Physical Activity: www.sophie.nih.gov/wx5arae      Tobacco use: www.quitwithusla.org

## 2023-11-29 ENCOUNTER — CLINICAL SUPPORT (OUTPATIENT)
Dept: OPHTHALMOLOGY | Facility: CLINIC | Age: 78
End: 2023-11-29
Payer: MEDICARE

## 2023-11-29 ENCOUNTER — OFFICE VISIT (OUTPATIENT)
Dept: OPTOMETRY | Facility: CLINIC | Age: 78
End: 2023-11-29
Payer: MEDICARE

## 2023-11-29 DIAGNOSIS — H40.011 OAG (OPEN ANGLE GLAUCOMA) SUSPECT, LOW RISK, RIGHT: Primary | ICD-10-CM

## 2023-11-29 DIAGNOSIS — H40.052 OCULAR HYPERTENSION OF LEFT EYE: ICD-10-CM

## 2023-11-29 PROCEDURE — 1101F PT FALLS ASSESS-DOCD LE1/YR: CPT | Mod: HCNC,CPTII,S$GLB, | Performed by: OPTOMETRIST

## 2023-11-29 PROCEDURE — 1159F PR MEDICATION LIST DOCUMENTED IN MEDICAL RECORD: ICD-10-PCS | Mod: HCNC,CPTII,S$GLB, | Performed by: OPTOMETRIST

## 2023-11-29 PROCEDURE — 99999 PR PBB SHADOW E&M-EST. PATIENT-LVL II: ICD-10-PCS | Mod: PBBFAC,HCNC,, | Performed by: OPTOMETRIST

## 2023-11-29 PROCEDURE — 99214 PR OFFICE/OUTPT VISIT, EST, LEVL IV, 30-39 MIN: ICD-10-PCS | Mod: HCNC,S$GLB,, | Performed by: OPTOMETRIST

## 2023-11-29 PROCEDURE — 1159F MED LIST DOCD IN RCRD: CPT | Mod: HCNC,CPTII,S$GLB, | Performed by: OPTOMETRIST

## 2023-11-29 PROCEDURE — 3288F PR FALLS RISK ASSESSMENT DOCUMENTED: ICD-10-PCS | Mod: HCNC,CPTII,S$GLB, | Performed by: OPTOMETRIST

## 2023-11-29 PROCEDURE — 3288F FALL RISK ASSESSMENT DOCD: CPT | Mod: HCNC,CPTII,S$GLB, | Performed by: OPTOMETRIST

## 2023-11-29 PROCEDURE — 1160F PR REVIEW ALL MEDS BY PRESCRIBER/CLIN PHARMACIST DOCUMENTED: ICD-10-PCS | Mod: HCNC,CPTII,S$GLB, | Performed by: OPTOMETRIST

## 2023-11-29 PROCEDURE — 99999 PR PBB SHADOW E&M-EST. PATIENT-LVL II: CPT | Mod: PBBFAC,HCNC,, | Performed by: OPTOMETRIST

## 2023-11-29 PROCEDURE — 1160F RVW MEDS BY RX/DR IN RCRD: CPT | Mod: HCNC,CPTII,S$GLB, | Performed by: OPTOMETRIST

## 2023-11-29 PROCEDURE — 99214 OFFICE O/P EST MOD 30 MIN: CPT | Mod: HCNC,S$GLB,, | Performed by: OPTOMETRIST

## 2023-11-29 PROCEDURE — 1126F AMNT PAIN NOTED NONE PRSNT: CPT | Mod: HCNC,CPTII,S$GLB, | Performed by: OPTOMETRIST

## 2023-11-29 PROCEDURE — 92133 POSTERIOR SEGMENT OCT OPTIC NERVE(OCULAR COHERENCE TOMOGRAPHY) - OU - BOTH EYES: ICD-10-PCS | Mod: HCNC,S$GLB,, | Performed by: OPTOMETRIST

## 2023-11-29 PROCEDURE — 1101F PR PT FALLS ASSESS DOC 0-1 FALLS W/OUT INJ PAST YR: ICD-10-PCS | Mod: HCNC,CPTII,S$GLB, | Performed by: OPTOMETRIST

## 2023-11-29 PROCEDURE — 1126F PR PAIN SEVERITY QUANTIFIED, NO PAIN PRESENT: ICD-10-PCS | Mod: HCNC,CPTII,S$GLB, | Performed by: OPTOMETRIST

## 2023-11-29 PROCEDURE — 92133 CPTRZD OPH DX IMG PST SGM ON: CPT | Mod: HCNC,S$GLB,, | Performed by: OPTOMETRIST

## 2023-11-30 NOTE — PROGRESS NOTES
HPI    MONALISA: 10/23  Chief complaint (CC): Patient is here for a follow up with a repeat OCT   and IOP check today.  Patient hasn't noticed any vision changes since the   last exam.  Patient has been using the AT's and they have helped with   comfort and redness  Glasses? +  Contacts? -  H/o eye surgery, injections or laser: PC IOL with macular hole repair OS  H/o eye injury: -  Known eye conditions? See above  Family h/o eye conditions? -  Eye gtts? Using Latanoprost OU Q HS and AT's      (-) Flashes (-)  Floaters (-) Mucous   (-)  Tearing (-) Itching (-) Burning   (-) Headaches (-) Eye Pain/discomfort (-) Irritation   (-)  Redness (-) Double vision (-) Blurry vision    Diabetic? -  A1c? -      Last edited by Erika Arteaga on 11/29/2023  3:35 PM.            Assessment /Plan     For exam results, see Encounter Report.    OAG (open angle glaucoma) suspect, low risk, right    Ocular hypertension of left eye      (-) FHx. IOP 23 OD, OS.Pt reports he is putting in his ATs right after the Latanoprost. Question if ATs is rinsing out Latanoprost. Last 20 OD, OS.  Tmax 21 OD, 38 OS. C/d 0.6 OD, 0.7 OS. Pachy  534 OD, 537 OS  6/8/20/22 OD unable, OS borderline NI and TI.   10/18/2023 OD T unable, thin TI, borderline NI, OS borderline NI  11/30/2023 OCT OD thin TI and NI, OS WNL  10/18/2023 HVF OD low reliability, non specific defect, OS low reliability, WNL  1st time saravanan  Educated pt on findings w/understanding.   No e/o glaucoma. Large c/d, High IOP and thin Pachy.  Cont Latanoprost QHs OS  RTC 6 weeks IOP. Rec spacing Latanoprost and AT out by 5 min.

## 2024-01-11 ENCOUNTER — OFFICE VISIT (OUTPATIENT)
Dept: OPTOMETRY | Facility: CLINIC | Age: 79
End: 2024-01-11
Payer: MEDICARE

## 2024-01-11 DIAGNOSIS — H40.052 OCULAR HYPERTENSION OF LEFT EYE: ICD-10-CM

## 2024-01-11 DIAGNOSIS — H04.123 DRY EYE SYNDROME OF BOTH EYES: ICD-10-CM

## 2024-01-11 DIAGNOSIS — H40.011 OAG (OPEN ANGLE GLAUCOMA) SUSPECT, LOW RISK, RIGHT: Primary | ICD-10-CM

## 2024-01-11 PROCEDURE — 1159F MED LIST DOCD IN RCRD: CPT | Mod: HCNC,CPTII,S$GLB, | Performed by: OPTOMETRIST

## 2024-01-11 PROCEDURE — 1126F AMNT PAIN NOTED NONE PRSNT: CPT | Mod: HCNC,CPTII,S$GLB, | Performed by: OPTOMETRIST

## 2024-01-11 PROCEDURE — 1160F RVW MEDS BY RX/DR IN RCRD: CPT | Mod: HCNC,CPTII,S$GLB, | Performed by: OPTOMETRIST

## 2024-01-11 PROCEDURE — 99999 PR PBB SHADOW E&M-EST. PATIENT-LVL III: CPT | Mod: PBBFAC,HCNC,, | Performed by: OPTOMETRIST

## 2024-01-11 PROCEDURE — 3288F FALL RISK ASSESSMENT DOCD: CPT | Mod: HCNC,CPTII,S$GLB, | Performed by: OPTOMETRIST

## 2024-01-11 PROCEDURE — 1101F PT FALLS ASSESS-DOCD LE1/YR: CPT | Mod: HCNC,CPTII,S$GLB, | Performed by: OPTOMETRIST

## 2024-01-11 PROCEDURE — 99214 OFFICE O/P EST MOD 30 MIN: CPT | Mod: HCNC,S$GLB,, | Performed by: OPTOMETRIST

## 2024-01-11 NOTE — PROGRESS NOTES
HPI     Glaucoma Suspect     Additional comments: 6 wk iop chk           Comments    MONALISA: 11/29/2023  Chief complaint (CC): Patient states that vision seems about the same as   last visit in both eyes.  Glasses? +  Contacts? -  H/o eye surgery, injections or laser: PC IOL with macular hole repair OS  H/o eye injury: -  Known eye conditions? See Chart Note  Family h/o eye conditions? -  Eye gtts? Using Latanoprost OS Q HS and AT's PRN OU      (-) Flashes (-)  Floaters (-) Mucous   (-)  Tearing (-) Itching (-) Burning   (-) Headaches (-) Eye Pain/discomfort (-) Irritation   (-)  Redness (-) Double vision (-) Blurry vision    Diabetic? -  A1c? -              Last edited by Cam Gutierres on 1/11/2024 10:55 AM.            Assessment /Plan     For exam results, see Encounter Report.    OAG (open angle glaucoma) suspect, low risk, right    Ocular hypertension of left eye    Dry eye syndrome of both eyes      (-) FHx. IOP 21 OD, 17 OS. Last 23 OD, OS.Pt reports he is putting in his ATs right after the Latanoprost. Question if ATs is rinsing out Latanoprost. Tmax 23 OD, 38 OS. C/d 0.6 OD, 0.7 OS. Pachy  534 OD, 537 OS  6/8/20/22 OD unable, OS borderline NI and TI.   10/18/2023 OD T unable, thin TI, borderline NI, OS borderline NI  11/30/2023 OCT OD thin TI and NI, OS WNL  10/18/2023 HVF OD low reliability, non specific defect, OS low reliability, WNL  1st time saravanan  Educated pt on findings w/understanding.   No e/o glaucoma. Large c/d, High IOP and thin Pachy.  Cont Latanoprost QHs OS  RTC 4 mo IOP check

## 2024-01-15 DIAGNOSIS — I10 HYPERTENSION, ESSENTIAL: ICD-10-CM

## 2024-01-15 DIAGNOSIS — E78.2 MIXED HYPERLIPIDEMIA: ICD-10-CM

## 2024-01-15 NOTE — TELEPHONE ENCOUNTER
Care Due:                  Date            Visit Type   Department     Provider  --------------------------------------------------------------------------------                                EP -                              PRIMARY      [facility]  Last Visit: 10-      CARE ([facility])   MEDICINE       JORDAN SANCHEZ  Next Visit: None Scheduled  None         None Found                                                            Last  Test          Frequency    Reason                     Performed    Due Date  --------------------------------------------------------------------------------    Office Visit  15 months..  lisinopriL-hydrochlorothi  10-   01-                             azide, pravastatin.......    CMP.........  12 months..  lisinopriL-hydrochlorothi  10-   10-                             azide, pravastatin.......    Lipid Panel.  12 months..  pravastatin..............  10-   10-    [facility] Embedded Care Due Messages. Reference number: [id number].   1/15/2024 3:12:24 PM CST

## 2024-01-17 RX ORDER — LISINOPRIL AND HYDROCHLOROTHIAZIDE 10; 12.5 MG/1; MG/1
1 TABLET ORAL DAILY
Qty: 90 TABLET | Refills: 0 | Status: SHIPPED | OUTPATIENT
Start: 2024-01-17 | End: 2025-01-16

## 2024-01-17 RX ORDER — PRAVASTATIN SODIUM 40 MG/1
40 TABLET ORAL DAILY
Qty: 90 TABLET | Refills: 0 | Status: SHIPPED | OUTPATIENT
Start: 2024-01-17

## 2024-01-17 NOTE — TELEPHONE ENCOUNTER
Refill Routing Note   Medication(s) are not appropriate for processing by [facility] for the following reason(s):        Patient not seen by provider within 15 months  Required labs outdated    [facility] action(s):  Defer   Requires appointment : Yes     Requires labs : Yes             Appointments  past 12m or future 3m with PCP    Date Provider   Last Visit   10/20/2022 Julia Fonseca MD   Next Visit   Visit date not found Julia Fonseca MD   ED visits in past 90 days: 0        Note composed:3:15 AM 01/17/2024

## 2024-03-05 ENCOUNTER — OFFICE VISIT (OUTPATIENT)
Dept: URGENT CARE | Facility: CLINIC | Age: 79
End: 2024-03-05
Payer: MEDICARE

## 2024-03-05 VITALS
HEIGHT: 68 IN | OXYGEN SATURATION: 98 % | SYSTOLIC BLOOD PRESSURE: 132 MMHG | WEIGHT: 228 LBS | HEART RATE: 73 BPM | RESPIRATION RATE: 19 BRPM | DIASTOLIC BLOOD PRESSURE: 69 MMHG | TEMPERATURE: 97 F | BODY MASS INDEX: 34.56 KG/M2

## 2024-03-05 DIAGNOSIS — S93.491A SPRAIN OF OTHER LIGAMENT OF RIGHT ANKLE, INITIAL ENCOUNTER: Primary | ICD-10-CM

## 2024-03-05 DIAGNOSIS — M77.31 HEEL SPUR, RIGHT: ICD-10-CM

## 2024-03-05 DIAGNOSIS — M19.071 PRIMARY OSTEOARTHRITIS OF RIGHT FOOT: ICD-10-CM

## 2024-03-05 PROCEDURE — 96372 THER/PROPH/DIAG INJ SC/IM: CPT | Mod: S$GLB,,, | Performed by: PHYSICIAN ASSISTANT

## 2024-03-05 PROCEDURE — 99213 OFFICE O/P EST LOW 20 MIN: CPT | Mod: 25,S$GLB,, | Performed by: PHYSICIAN ASSISTANT

## 2024-03-05 PROCEDURE — 73630 X-RAY EXAM OF FOOT: CPT | Mod: FY,RT,S$GLB, | Performed by: RADIOLOGY

## 2024-03-05 RX ORDER — DICLOFENAC SODIUM 10 MG/G
2 GEL TOPICAL 3 TIMES DAILY
Qty: 150 G | Refills: 0 | Status: SHIPPED | OUTPATIENT
Start: 2024-03-05 | End: 2024-04-04

## 2024-03-05 RX ORDER — IBUPROFEN 600 MG/1
600 TABLET ORAL EVERY 8 HOURS PRN
Qty: 30 TABLET | Refills: 0 | Status: SHIPPED | OUTPATIENT
Start: 2024-03-06 | End: 2024-03-16

## 2024-03-05 RX ORDER — KETOROLAC TROMETHAMINE 30 MG/ML
30 INJECTION, SOLUTION INTRAMUSCULAR; INTRAVENOUS
Status: COMPLETED | OUTPATIENT
Start: 2024-03-05 | End: 2024-03-05

## 2024-03-05 RX ADMIN — KETOROLAC TROMETHAMINE 30 MG: 30 INJECTION, SOLUTION INTRAMUSCULAR; INTRAVENOUS at 10:03

## 2024-03-05 NOTE — PATIENT INSTRUCTIONS
Discussed with patient that if he/she is in pain today, only take tylenol due to toradol injection received in clinic. You can continue NSAIDS tomorrow such as ibuprofen (Motrin/Advil), naproxen (Aleve), Mobic (Meloxicam).       A sprain is an injury to the ligaments and capsule at a joint in the body. A strain is an injury to muscles or tendons. Immediate treatment of sprains or strains includes RICE - Rest, ice, compression and elevation to the affected joint or limb as needed.    Rest. Allow your injury to heal before you do slow movements.  Place an ice pack or a bag of frozen peas wrapped in a towel over the painful part. Never put ice right on the skin. Do not leave the ice on more than 10 to 15 minutes at a time. Ice after activity may help decrease pain and swelling. Never ice before stretching.  Prop your knee on pillows to help with swelling.  Use a splint/ brace if the doctor tells you to do this.  Please drink plenty of fluids.  Please get plenty of rest.  Ok to continue epsom foot soaks/massages.       Pain Control  If not allergic, please take over the counter Tylenol (Acetaminophen) 650 mg every 4-6 hours and/or Motrin (Ibuprofen) 600-800 mg every 6-8 hours as directed for control of pain and/or fever.    If you were prescribed a narcotic medication, do not drive or operate heavy equipment or machinery while taking these medications.    If you were not prescribed an anti-inflammatory medication, and if you do not have any history of stomach/intestinal ulcers, or kidney disease, or are not taking a blood thinner such as Coumadin, Plavix, Pradaxa, Eloquis, or Xaralta for example, it is OK to take over the counter Ibuprofen or Advil or Motrin or Aleve as directed.  Do not take these medications on an empty stomach.    Please remember that you have received care at an urgent care today. Urgent cares are not emergency rooms and are not equipped to handle life threatening emergencies and cannot rule in or  out certain medical conditions and you may be released before all of your medical problems are known or treated. Please arrange follow up with your primary care physician or speciality clinic   (orthopedics) within 2-5 days if your signs and symptoms have not resolved or worsen. Patient can call our Referral Hotline at [phone] to make an appointment.    Please return here or go to the Emergency Department for any concerns or worsening of condition.Patient was educated on signs/symptoms that would warrant emergent medical attention. Patient verbalized understanding.  More trouble getting up from a chair, going up and down stairs, or walking  Pain, swelling, warmth, numbness, tingling, or discoloration in the calf below the injured or sore knee  You are not feeling better in 2 or 3 days or you are feeling worse

## 2024-03-05 NOTE — LETTER
"  March 5, 2024      [facility] [place]  [address]  [place] [address]  Phone: [phone]  Fax: [phone]       Patient: Geovani Evans   YOB: 1945  Date of Visit: 03/05/2024    To Whom It May Concern:    Christian Evans  was at [facility] on 03/05/2024. The patient may return to work/school on 3/6/24 with no restrictions. If you have any questions or concerns, or if I can be of further assistance, please do not hesitate to contact me.    Sincerely,        Karinaabdoulaye Badillo PA-C (Jackie)       " Other...

## 2024-03-05 NOTE — PROGRESS NOTES
"Subjective:      Patient ID: Geovani Evans Jr. is a 78 y.o. male.    Vitals:  height is 5' 8" (1.727 m) and weight is 103.4 kg (228 lb). His oral temperature is 97.3 °F (36.3 °C). His blood pressure is 132/69 and his pulse is 73. His respiration is 19 and oxygen saturation is 98%.     Chief Complaint: Foot Pain    Geovani Evans Jr. is a 78 y.o. male  with hypertension, arthritis, and obesity who complains of  right foot pain that started two weeks ago. He said he has no prior injury to the foot area, it is a sharp pain. He took tylenol 650 mg every 4-5 hours and voltaren gel for the pain. Pt walks with a cane; sitting in wheel chair today; states he hasn't been able to stand by himself due to pain.    Foot Injury   The incident occurred more than 1 week ago. There was no injury mechanism. The pain is present in the right foot. The quality of the pain is described as shooting. The pain is at a severity of 10/10. The pain has been Constant since onset. Associated symptoms include an inability to bear weight. Pertinent negatives include no loss of motion, loss of sensation, muscle weakness, numbness or tingling. He reports no foreign bodies present. The symptoms are aggravated by weight bearing. He has tried acetaminophen, ice, heat, non-weight bearing and rest for the symptoms. The treatment provided mild relief.       Constitution: Negative for appetite change, sweating, fatigue, fever and generalized weakness.   Neck: Negative for neck pain and neck stiffness.   Cardiovascular:  Negative for chest pain, leg swelling, palpitations and sob on exertion.   Respiratory:  Negative for cough, sputum production, shortness of breath, wheezing and asthma.    Genitourinary:  Negative for dysuria, frequency and urgency.   Musculoskeletal:  Positive for pain, arthritis, gout, pain with walking and muscle ache. Negative for trauma, joint pain, joint swelling, abnormal ROM of joint, back pain, muscle cramps and history of spine " disorder.   Skin:  Negative for rash.   Allergic/Immunologic: Negative for asthma.   Neurological:  Negative for numbness.   Psychiatric/Behavioral:  Negative for nervous/anxious. The patient is not nervous/anxious.       Objective:     Physical Exam   Constitutional: He is oriented to person, place, and time. He appears well-developed. He is cooperative. No distress.      Comments:Patient is awake and alert, sitting up in exam chair, speaking and answering in complete sentences     normal  HENT:   Head: Normocephalic and atraumatic.   Ears:   Right Ear: Hearing, tympanic membrane, external ear and ear canal normal.   Left Ear: Hearing, tympanic membrane, external ear and ear canal normal.   Nose: Nose normal. No mucosal edema or nasal deformity. No epistaxis. Right sinus exhibits no maxillary sinus tenderness and no frontal sinus tenderness. Left sinus exhibits no maxillary sinus tenderness and no frontal sinus tenderness.   Mouth/Throat: Uvula is midline, oropharynx is clear and moist and mucous membranes are normal. Mucous membranes are moist. No trismus in the jaw. Normal dentition. No uvula swelling. Oropharynx is clear.   Eyes: Conjunctivae and lids are normal. Pupils are equal, round, and reactive to light. Extraocular movement intact   Neck: Trachea normal and phonation normal. Neck supple.   Cardiovascular: Normal rate, regular rhythm, normal heart sounds and normal pulses.   Pulmonary/Chest: Effort normal and breath sounds normal.   Abdominal: Normal appearance.   Musculoskeletal: Normal range of motion.         General: Tenderness present. No swelling. Normal range of motion.        Feet:       Comments: There is no swelling and mild tenderness over the lateral malleolus. No tenderness over the medial aspect of the ankle. Normal ROM of ankle     Neurological: He is alert and oriented to person, place, and time. He exhibits normal muscle tone.   Skin: Skin is warm, dry and intact.   Psychiatric: His speech  is normal and behavior is normal. Mood, judgment and thought content normal.   Nursing note and vitals reviewed.      Assessment:     1. Sprain of other ligament of right ankle, initial encounter    2. Primary osteoarthritis of right foot    3. Heel spur, right      Patient presents with clinical exam findings and history consistent with above.      On exam, patient is nontoxic appearing and vitals are stable.      Diagnostic testing results were reviewed and discussed with patient/guardian.   Tests ordered in clinic: None  Previous progress notes/admissions/labs and medications were reviewed.  Reviewed GFR of 57.6 from [facility] 10/20/22.       Plan:   Pain reports 100% improvement with toradol injection; he is able to stand up after 2 weeks.     Sprain of other ligament of right ankle, initial encounter  -     XR FOOT COMPLETE 3 VIEW RIGHT; Future; Expected date: 03/05/2024  -     ketorolac injection 30 mg  -     diclofenac sodium (VOLTAREN) 1 % Gel; Apply 2 g topically 3 (three) times daily.  Dispense: 150 g; Refill: 0  -     ibuprofen (ADVIL,MOTRIN) 600 MG tablet; Take 1 tablet (600 mg total) by mouth every 8 (eight) hours as needed for Pain.  Dispense: 30 tablet; Refill: 0  -     BANDAGE ELASTIC 4IN ACE NS  -     Ambulatory referral/consult to Podiatry  -     Ambulatory referral/consult to Internal Medicine    Primary osteoarthritis of right foot  -     diclofenac sodium (VOLTAREN) 1 % Gel; Apply 2 g topically 3 (three) times daily.  Dispense: 150 g; Refill: 0  -     ibuprofen (ADVIL,MOTRIN) 600 MG tablet; Take 1 tablet (600 mg total) by mouth every 8 (eight) hours as needed for Pain.  Dispense: 30 tablet; Refill: 0  -     BANDAGE ELASTIC 4IN ACE NS  -     Ambulatory referral/consult to Podiatry    Heel spur, right  -     diclofenac sodium (VOLTAREN) 1 % Gel; Apply 2 g topically 3 (three) times daily.  Dispense: 150 g; Refill: 0  -     ibuprofen (ADVIL,MOTRIN) 600 MG tablet; Take 1 tablet (600 mg total) by mouth  "every 8 (eight) hours as needed for Pain.  Dispense: 30 tablet; Refill: 0  -     BANDAGE ELASTIC 4IN ACE NS  -     Ambulatory referral/consult to Podiatry                    1) See orders for this visit as documented in the electronic medical record.  2) Symptomatic therapy suggested: use acetaminophen/ibuprofen every 6-8 hours prn pain or fever, push fluids.   3) Call or return to clinic prn if these symptoms worsen or fail to improve as anticipated.    Discussed results/diagnosis/plan with patient in clinic.  We had shared decision making for patient's treatment. Patient verbalized understanding and in agreement with current treatment plan.     Patient was instructed to return for re-evaluation with urgent care or PCP for continued outpatient workup and management if symptoms do not improve/worsening symptoms. Strict ED versus clinic precautions given in depth.    Discharge and follow-up instructions given verbally/printed with the patient who expressed understanding. The instructions and results are also available on [employer].              Karina "Dominick Badillo PA-C          Patient Instructions   Discussed with patient that if he/she is in pain today, only take tylenol due to toradol injection received in clinic. You can continue NSAIDS tomorrow such as ibuprofen (Motrin/Advil), naproxen (Aleve), Mobic (Meloxicam).       A sprain is an injury to the ligaments and capsule at a joint in the body. A strain is an injury to muscles or tendons. Immediate treatment of sprains or strains includes RICE - Rest, ice, compression and elevation to the affected joint or limb as needed.    Rest. Allow your injury to heal before you do slow movements.  Place an ice pack or a bag of frozen peas wrapped in a towel over the painful part. Never put ice right on the skin. Do not leave the ice on more than 10 to 15 minutes at a time. Ice after activity may help decrease pain and swelling. Never ice before stretching.  Prop your knee " on pillows to help with swelling.  Use a splint/ brace if the doctor tells you to do this.  Please drink plenty of fluids.  Please get plenty of rest.  Ok to continue epsom foot soaks/massages.       Pain Control  If not allergic, please take over the counter Tylenol (Acetaminophen) 650 mg every 4-6 hours and/or Motrin (Ibuprofen) 600-800 mg every 6-8 hours as directed for control of pain and/or fever.    If you were prescribed a narcotic medication, do not drive or operate heavy equipment or machinery while taking these medications.    If you were not prescribed an anti-inflammatory medication, and if you do not have any history of stomach/intestinal ulcers, or kidney disease, or are not taking a blood thinner such as Coumadin, Plavix, Pradaxa, Eloquis, or Xaralta for example, it is OK to take over the counter Ibuprofen or Advil or Motrin or Aleve as directed.  Do not take these medications on an empty stomach.    Please remember that you have received care at an urgent care today. Urgent cares are not emergency rooms and are not equipped to handle life threatening emergencies and cannot rule in or out certain medical conditions and you may be released before all of your medical problems are known or treated. Please arrange follow up with your primary care physician or speciality clinic   (orthopedics) within 2-5 days if your signs and symptoms have not resolved or worsen. Patient can call our Referral Hotline at [phone] to make an appointment.    Please return here or go to the Emergency Department for any concerns or worsening of condition.Patient was educated on signs/symptoms that would warrant emergent medical attention. Patient verbalized understanding.  More trouble getting up from a chair, going up and down stairs, or walking  Pain, swelling, warmth, numbness, tingling, or discoloration in the calf below the injured or sore knee  You are not feeling better in 2 or 3 days or you are feeling  worse

## 2024-03-14 ENCOUNTER — LAB VISIT (OUTPATIENT)
Dept: LAB | Facility: HOSPITAL | Age: 79
End: 2024-03-14
Attending: PODIATRIST
Payer: MEDICARE

## 2024-03-14 ENCOUNTER — OFFICE VISIT (OUTPATIENT)
Dept: PODIATRY | Facility: CLINIC | Age: 79
End: 2024-03-14
Payer: MEDICARE

## 2024-03-14 VITALS
RESPIRATION RATE: 18 BRPM | HEART RATE: 61 BPM | DIASTOLIC BLOOD PRESSURE: 81 MMHG | BODY MASS INDEX: 34.75 KG/M2 | SYSTOLIC BLOOD PRESSURE: 139 MMHG | WEIGHT: 229.25 LBS | HEIGHT: 68 IN

## 2024-03-14 DIAGNOSIS — Z87.39 H/O: GOUT: ICD-10-CM

## 2024-03-14 DIAGNOSIS — M25.571 ACUTE RIGHT ANKLE PAIN: ICD-10-CM

## 2024-03-14 DIAGNOSIS — S93.401D SPRAIN OF RIGHT ANKLE, UNSPECIFIED LIGAMENT, SUBSEQUENT ENCOUNTER: ICD-10-CM

## 2024-03-14 DIAGNOSIS — M25.571 ACUTE RIGHT ANKLE PAIN: Primary | ICD-10-CM

## 2024-03-14 LAB — URATE SERPL-MCNC: 8.4 MG/DL (ref 3.4–7)

## 2024-03-14 PROCEDURE — 3075F SYST BP GE 130 - 139MM HG: CPT | Mod: HCNC,CPTII,S$GLB, | Performed by: PODIATRIST

## 2024-03-14 PROCEDURE — 1159F MED LIST DOCD IN RCRD: CPT | Mod: HCNC,CPTII,S$GLB, | Performed by: PODIATRIST

## 2024-03-14 PROCEDURE — 1160F RVW MEDS BY RX/DR IN RCRD: CPT | Mod: HCNC,CPTII,S$GLB, | Performed by: PODIATRIST

## 2024-03-14 PROCEDURE — 99214 OFFICE O/P EST MOD 30 MIN: CPT | Mod: HCNC,S$GLB,, | Performed by: PODIATRIST

## 2024-03-14 PROCEDURE — 36415 COLL VENOUS BLD VENIPUNCTURE: CPT | Mod: HCNC,PN | Performed by: PODIATRIST

## 2024-03-14 PROCEDURE — 99999 PR PBB SHADOW E&M-EST. PATIENT-LVL III: CPT | Mod: PBBFAC,HCNC,, | Performed by: PODIATRIST

## 2024-03-14 PROCEDURE — 1126F AMNT PAIN NOTED NONE PRSNT: CPT | Mod: HCNC,CPTII,S$GLB, | Performed by: PODIATRIST

## 2024-03-14 PROCEDURE — 84550 ASSAY OF BLOOD/URIC ACID: CPT | Mod: HCNC | Performed by: PODIATRIST

## 2024-03-14 PROCEDURE — 3079F DIAST BP 80-89 MM HG: CPT | Mod: HCNC,CPTII,S$GLB, | Performed by: PODIATRIST

## 2024-03-14 NOTE — PROGRESS NOTES
Subjective:      Patient ID: Geovani Evans Jr. is a 78 y.o. male.    Chief Complaint:   Ankle Pain (Rt ankle pain ), Foot Problem (Bone on bone Rt Great toe and pain ), and Joint Swelling (Rt ankle )    Geovani is a 78 y.o. male who returns to the podiatry clinic  with complaint of  right ankle pain.  Patient relates that he did not have any injury  Just started hurting  Overall he is noticed improvement since urgent care    Went to urgent care:  Chief Complaint: Foot Pain     Geovani Evans Jr. is a 78 y.o. male  with hypertension, arthritis, and obesity who complains of  right foot pain that started two weeks ago. He said he has no prior injury to the foot area, it is a sharp pain. He took tylenol 650 mg every 4-5 hours and voltaren gel for the pain. Pt walks with a cane; sitting in wheel chair today; states he hasn't been able to stand by himself due to pain.     Foot Injury   The incident occurred more than 1 week ago. There was no injury mechanism. The pain is present in the right foot. The quality of the pain is described as shooting. The pain is at a severity of 10/10. The pain has been Constant since onset. Associated symptoms include an inability to bear weight. Pertinent negatives include no loss of motion, loss of sensation, muscle weakness, numbness or tingling. He reports no foreign bodies present. The symptoms are aggravated by weight bearing. He has tried acetaminophen, ice, heat, non-weight bearing and rest for the symptoms. The treatment provided mild relief.   1. Sprain of other ligament of right ankle, initial encounter    2. Primary osteoarthritis of right foot    3. Heel spur, right       Patient presents with clinical exam findings and history consistent with above.      On exam, patient is nontoxic appearing and vitals are stable.       Diagnostic testing results were reviewed and discussed with patient/guardian.   Tests ordered in clinic: None  Previous progress notes/admissions/labs and  medications were reviewed.  Reviewed GFR of 57.6 from [facility] 10/20/22.         Plan:   Pain reports 100% improvement with toradol injection; he is able to stand up after 2 weeks.      Sprain of other ligament of right ankle, initial encounter  -     XR FOOT COMPLETE 3 VIEW RIGHT; Future; Expected date: 03/05/2024  -     ketorolac injection 30 mg  -     diclofenac sodium (VOLTAREN) 1 % Gel; Apply 2 g topically 3 (three) times daily.  Dispense: 150 g; Refill: 0  -     ibuprofen (ADVIL,MOTRIN) 600 MG tablet; Take 1 tablet (600 mg total) by mouth every 8 (eight) hours as needed for Pain.  Dispense: 30 tablet; Refill: 0  -     BANDAGE ELASTIC 4IN ACE NS  -     Ambulatory referral/consult to Podiatry  -     Ambulatory referral/consult to Internal Medicine     Primary osteoarthritis of right foot  -     diclofenac sodium (VOLTAREN) 1 % Gel; Apply 2 g topically 3 (three) times daily.  Dispense: 150 g; Refill: 0  -     ibuprofen (ADVIL,MOTRIN) 600 MG tablet; Take 1 tablet (600 mg total) by mouth every 8 (eight) hours as needed for Pain.  Dispense: 30 tablet; Refill: 0  -     BANDAGE ELASTIC 4IN ACE NS  -     Ambulatory referral/consult to Podiatry     Heel spur, right  -     diclofenac sodium (VOLTAREN) 1 % Gel; Apply 2 g topically 3 (three) times daily.  Dispense: 150 g; Refill: 0  -     ibuprofen (ADVIL,MOTRIN) 600 MG tablet; Take 1 tablet (600 mg total) by mouth every 8 (eight) hours as needed for Pain.  Dispense: 30 tablet; Refill: 0  -     BANDAGE ELASTIC 4IN ACE NS  -     Ambulatory referral/consult to Podiatry          Last seen by me in 2022:  - rx antifugal cream; discussed options, will consider nail fungal treatment in the future briefly discussed option    - rx xrays    - d/w pt ideal may need surgical intervention to increase rom of 1st mTpj and decreased pain.  Patient relates he has had surgical intervention in the past and not interested at this time    - recommend consider injection    - dispensed MT pad  sleeve    - recommend rtc 1 month. Pt relates he will consider. He relates it is not bothering him much          Lab Results   Component Value Date    URICACID 8.4 (H) 2024          Past Medical History:   Diagnosis Date    Anemia     Arthritis     Cataract     Hypertension     Hypogonadism male     Obesity     Thyroid nodule     neg bx 2011     Past Surgical History:   Procedure Laterality Date    EYE SURGERY Left     cataract     FRACTURE SURGERY  1970    left great toe. bunion repair/shaved bone     Current Outpatient Medications on File Prior to Visit   Medication Sig Dispense Refill    diclofenac sodium (VOLTAREN) 1 % Gel Apply 2 g topically once daily. 20 g 3    [] ibuprofen (ADVIL,MOTRIN) 600 MG tablet Take 1 tablet (600 mg total) by mouth every 8 (eight) hours as needed for Pain. 30 tablet 0    latanoprost 0.005 % ophthalmic solution Place 1 drop into the left eye every evening. 2.5 mL 11    lisinopriL-hydrochlorothiazide (PRINZIDE,ZESTORETIC) 10-12.5 mg per tablet Take 1 tablet by mouth once daily. 90 tablet 0    pravastatin (PRAVACHOL) 40 MG tablet Take 1 tablet (40 mg total) by mouth once daily. 90 tablet 0    RSVPreF3 antigen-AS01E, PF, (AREXVY, PF,) 120 mcg/0.5 mL SusR vaccine Inject into the muscle. 1 each 0    diclofenac sodium (VOLTAREN) 1 % Gel Apply 2 g topically 3 (three) times daily. (Patient not taking: Reported on 3/14/2024) 150 g 0     No current facility-administered medications on file prior to visit.     Review of patient's allergies indicates:  No Known Allergies    Review of Systems   Constitutional: Negative for chills, decreased appetite, fever, malaise/fatigue, night sweats, weight gain and weight loss.   Cardiovascular:  Negative for chest pain, claudication, dyspnea on exertion, leg swelling, palpitations and syncope.   Respiratory:  Negative for cough and shortness of breath.    Endocrine: Negative for cold intolerance and heat intolerance.   Hematologic/Lymphatic:  "Negative for bleeding problem. Does not bruise/bleed easily.   Skin:  Positive for nail changes. Negative for color change, dry skin, flushing, itching, poor wound healing, rash, skin cancer, suspicious lesions and unusual hair distribution.   Musculoskeletal:  Positive for arthritis. Negative for back pain, falls, gout, joint pain, joint swelling, muscle cramps, muscle weakness, myalgias, neck pain and stiffness.   Gastrointestinal:  Negative for diarrhea, nausea and vomiting.   Neurological:  Positive for numbness and paresthesias. Negative for dizziness, focal weakness, light-headedness, tremors, vertigo and weakness.   Psychiatric/Behavioral:  Negative for altered mental status and depression. The patient does not have insomnia.    Allergic/Immunologic: Negative.            Objective:       Vitals:    24 1345   BP: 139/81   Pulse: 61   Resp: 18   Weight: 104 kg (229 lb 4.5 oz)   Height: 5' 8" (1.727 m)   PainSc: 0-No pain   104 kg (229 lb 4.5 oz)     Physical Exam  Vitals reviewed.   Constitutional:       General: He is not in acute distress.     Appearance: He is well-developed. He is not ill-appearing, toxic-appearing or diaphoretic.      Comments: Proper supportive shoegear      Cardiovascular:      Pulses:           Dorsalis pedis pulses are 2+ on the right side and 2+ on the left side.        Posterior tibial pulses are 1+ on the right side and 1+ on the left side.   Musculoskeletal:         General: No swelling.      Right lower le+ Edema present.      Left lower le+ Edema present.      Right ankle: Swelling present. Tenderness present. No lateral malleolus, base of 5th metatarsal or proximal fibula tenderness. Decreased range of motion. Anterior drawer test negative.      Right Achilles Tendon: Normal. No tenderness.      Left ankle: Normal. No tenderness.      Left Achilles Tendon: Normal. No tenderness.      Right foot: Decreased range of motion. Deformity, bunion and prominent metatarsal " heads present. No tenderness or bony tenderness.      Left foot: Decreased range of motion. Deformity, bunion and prominent metatarsal heads present. No tenderness, bony tenderness or crepitus.      Comments: Mild pain with range of motion right ankle no pain on palpation to malleoli    Strength 5/5 b/l   Feet:      Right foot:      Protective Sensation: 5 sites tested.  5 sites sensed.      Skin integrity: Dry skin present. No ulcer, blister, skin breakdown, erythema, warmth or callus.      Toenail Condition: Right toenails are abnormally thick. Fungal disease present.     Left foot:      Protective Sensation: 5 sites tested.  5 sites sensed.      Skin integrity: Dry skin present. No ulcer, blister, skin breakdown, erythema, warmth or callus.      Toenail Condition: Left toenails are abnormally thick.      Comments: No erythema   Skin:     General: Skin is warm.      Capillary Refill: Capillary refill takes 2 to 3 seconds.      Coloration: Skin is not pale.      Findings: No erythema or rash.      Nails: There is no clubbing.   Neurological:      Mental Status: He is alert and oriented to person, place, and time.      Sensory: No sensory deficit.      Gait: Gait abnormal.   Psychiatric:         Attention and Perception: Attention normal.         Mood and Affect: Mood normal.         Speech: Speech normal.         Behavior: Behavior normal.         Thought Content: Thought content normal.         Cognition and Memory: Cognition normal.         Judgment: Judgment normal.               Assessment:       Encounter Diagnoses   Name Primary?    Acute right ankle pain Yes    H/O: gout     Sprain of right ankle, unspecified ligament, subsequent encounter            Plan:       Geovani was seen today for ankle pain, foot problem and joint swelling.    Diagnoses and all orders for this visit:    Acute right ankle pain  -     Uric acid; Future  -     HME - OTHER    H/O: gout  -     Uric acid; Future    Sprain of right ankle,  unspecified ligament, subsequent encounter  -     HME - OTHER        I counseled the patient on his conditions, their implications and medical management.  Urgent care notes reviewed with imaging    - gout as patient denies any injury however urgent care considered ankle sprain high probability    -recommend ankle brace today   Dispensed, fitted and gait trained with ankle brace. Instructed to wear with supportive shoe at all times when placing weight on the foot.   Consider p.therapy  - rx uric acid    Discussed with patient if gout patient will need to be long-term monitored by primary for gout medication    - prn        Follow up if symptoms worsen or fail to improve.

## 2024-03-15 ENCOUNTER — TELEPHONE (OUTPATIENT)
Dept: PODIATRY | Facility: CLINIC | Age: 79
End: 2024-03-15
Payer: MEDICARE

## 2024-03-15 NOTE — TELEPHONE ENCOUNTER
----- Message from Dali Khan DPM sent at 3/15/2024  7:34 AM CDT -----  Please call and let patient know his uric acid is very high so his pain is likely associated with a gout attack.  I recommend he touch base with his primary care doctor for any gout management

## 2025-01-02 ENCOUNTER — OFFICE VISIT (OUTPATIENT)
Dept: INTERNAL MEDICINE | Facility: CLINIC | Age: 80
End: 2025-01-02
Payer: MEDICARE

## 2025-01-02 ENCOUNTER — LAB VISIT (OUTPATIENT)
Dept: LAB | Facility: HOSPITAL | Age: 80
End: 2025-01-02
Attending: INTERNAL MEDICINE
Payer: MEDICARE

## 2025-01-02 VITALS
HEART RATE: 60 BPM | BODY MASS INDEX: 34.39 KG/M2 | HEIGHT: 68 IN | SYSTOLIC BLOOD PRESSURE: 128 MMHG | DIASTOLIC BLOOD PRESSURE: 70 MMHG | OXYGEN SATURATION: 98 % | WEIGHT: 226.94 LBS

## 2025-01-02 DIAGNOSIS — E78.2 MIXED HYPERLIPIDEMIA: ICD-10-CM

## 2025-01-02 DIAGNOSIS — E04.2 MULTINODULAR GOITER (NONTOXIC): ICD-10-CM

## 2025-01-02 DIAGNOSIS — Z00.00 ANNUAL PHYSICAL EXAM: Primary | ICD-10-CM

## 2025-01-02 DIAGNOSIS — N18.31 CHRONIC KIDNEY DISEASE, STAGE 3A: ICD-10-CM

## 2025-01-02 DIAGNOSIS — Z87.39 HISTORY OF GOUT: ICD-10-CM

## 2025-01-02 DIAGNOSIS — E29.1 MALE HYPOGONADISM: ICD-10-CM

## 2025-01-02 DIAGNOSIS — I10 HYPERTENSION, ESSENTIAL: ICD-10-CM

## 2025-01-02 LAB
ALBUMIN SERPL BCP-MCNC: 4.1 G/DL (ref 3.5–5.2)
ALP SERPL-CCNC: 52 U/L (ref 40–150)
ALT SERPL W/O P-5'-P-CCNC: 14 U/L (ref 10–44)
ANION GAP SERPL CALC-SCNC: 11 MMOL/L (ref 8–16)
AST SERPL-CCNC: 18 U/L (ref 10–40)
BILIRUB SERPL-MCNC: 0.4 MG/DL (ref 0.1–1)
BUN SERPL-MCNC: 17 MG/DL (ref 8–23)
CALCIUM SERPL-MCNC: 9.8 MG/DL (ref 8.7–10.5)
CHLORIDE SERPL-SCNC: 106 MMOL/L (ref 95–110)
CHOLEST SERPL-MCNC: 184 MG/DL (ref 120–199)
CHOLEST/HDLC SERPL: 4 {RATIO} (ref 2–5)
CO2 SERPL-SCNC: 27 MMOL/L (ref 23–29)
CREAT SERPL-MCNC: 1.2 MG/DL (ref 0.5–1.4)
ERYTHROCYTE [DISTWIDTH] IN BLOOD BY AUTOMATED COUNT: 13.1 % (ref 11.5–14.5)
EST. GFR  (NO RACE VARIABLE): >60 ML/MIN/1.73 M^2
GLUCOSE SERPL-MCNC: 90 MG/DL (ref 70–110)
HCT VFR BLD AUTO: 38.8 % (ref 40–54)
HDLC SERPL-MCNC: 46 MG/DL (ref 40–75)
HDLC SERPL: 25 % (ref 20–50)
HGB BLD-MCNC: 12.3 G/DL (ref 14–18)
LDLC SERPL CALC-MCNC: 108.8 MG/DL (ref 63–159)
MCH RBC QN AUTO: 28.6 PG (ref 27–31)
MCHC RBC AUTO-ENTMCNC: 31.7 G/DL (ref 32–36)
MCV RBC AUTO: 90 FL (ref 82–98)
NONHDLC SERPL-MCNC: 138 MG/DL
PLATELET # BLD AUTO: 209 K/UL (ref 150–450)
PMV BLD AUTO: 11.6 FL (ref 9.2–12.9)
POTASSIUM SERPL-SCNC: 4.5 MMOL/L (ref 3.5–5.1)
PROT SERPL-MCNC: 8.3 G/DL (ref 6–8.4)
RBC # BLD AUTO: 4.3 M/UL (ref 4.6–6.2)
SODIUM SERPL-SCNC: 144 MMOL/L (ref 136–145)
TRIGL SERPL-MCNC: 146 MG/DL (ref 30–150)
TSH SERPL DL<=0.005 MIU/L-ACNC: 1.26 UIU/ML (ref 0.4–4)
WBC # BLD AUTO: 9.06 K/UL (ref 3.9–12.7)

## 2025-01-02 PROCEDURE — 84443 ASSAY THYROID STIM HORMONE: CPT | Mod: HCNC | Performed by: INTERNAL MEDICINE

## 2025-01-02 PROCEDURE — 80061 LIPID PANEL: CPT | Mod: HCNC | Performed by: INTERNAL MEDICINE

## 2025-01-02 PROCEDURE — 99999 PR PBB SHADOW E&M-EST. PATIENT-LVL III: CPT | Mod: PBBFAC,HCNC,, | Performed by: INTERNAL MEDICINE

## 2025-01-02 PROCEDURE — 80053 COMPREHEN METABOLIC PANEL: CPT | Mod: HCNC | Performed by: INTERNAL MEDICINE

## 2025-01-02 PROCEDURE — 85027 COMPLETE CBC AUTOMATED: CPT | Mod: HCNC | Performed by: INTERNAL MEDICINE

## 2025-01-02 PROCEDURE — 36415 COLL VENOUS BLD VENIPUNCTURE: CPT | Mod: HCNC | Performed by: INTERNAL MEDICINE

## 2025-01-02 RX ORDER — LISINOPRIL AND HYDROCHLOROTHIAZIDE 10; 12.5 MG/1; MG/1
1 TABLET ORAL DAILY
Qty: 90 TABLET | Refills: 3 | Status: SHIPPED | OUTPATIENT
Start: 2025-01-02 | End: 2026-01-02

## 2025-01-02 RX ORDER — PRAVASTATIN SODIUM 40 MG/1
40 TABLET ORAL DAILY
Qty: 90 TABLET | Refills: 3 | Status: SHIPPED | OUTPATIENT
Start: 2025-01-02

## 2025-01-02 NOTE — PROGRESS NOTES
Subjective     Patient ID: Geovani Evans Jr. is a 79 y.o. male.    Chief Complaint: Annual Exam    HPI  Last seen 2022.      Long standing hypertension, complicated by ckd 3a.  BP has been normal at the gym.    Headaches persist, but only with sneezing.    Last about 2 minutes at most.    MRI brain 10/22:     No acute intracranial abnormalities.  No abnormal intracranial enhancing lesions.   Prominent right transverse sigmoid sinus arachnoid granulations with small amount of the right posterior temporal lobe parenchyma protruding into the dural venous sinus.  This may be an incidental finding but can be associated with idiopathic intracranial hypertension in the appropriate clinical setting.  Recommend clinical correlation.    No recent gout.  Limits seafood.    Ankle pain, evaluated last march , has resolved.    MN Goiter.    BMI stable at 34.     Hypogonadism, not interested in restarting T.     Chronic anemia.    No lower urinary symptoms.              Review of Systems   Constitutional:  Negative for activity change and unexpected weight change.   HENT:  Negative for postnasal drip, rhinorrhea and sinus pressure/congestion.    Respiratory:  Negative for chest tightness and shortness of breath.    Cardiovascular:  Negative for chest pain and leg swelling.   Gastrointestinal:  Negative for abdominal pain, nausea and vomiting.   Genitourinary:  Negative for difficulty urinating, dysuria, hematuria and urgency.   Integumentary:  Negative for rash.   Neurological:  Negative for headaches.   Psychiatric/Behavioral:  Negative for dysphoric mood and sleep disturbance. The patient is not nervous/anxious.           Objective     Physical Exam  Constitutional:       General: He is not in acute distress.     Appearance: He is well-developed. He is not ill-appearing, toxic-appearing or diaphoretic.   HENT:      Head: Normocephalic and atraumatic.   Eyes:      General: No scleral icterus.        Left eye: No discharge.       Conjunctiva/sclera: Conjunctivae normal.   Neck:      Thyroid: No thyromegaly.      Vascular: No JVD.   Cardiovascular:      Rate and Rhythm: Normal rate and regular rhythm.      Heart sounds: Normal heart sounds. No murmur heard.  Pulmonary:      Effort: Pulmonary effort is normal. No respiratory distress.      Breath sounds: Normal breath sounds. No stridor. No wheezing, rhonchi or rales.   Abdominal:      General: There is no distension.      Palpations: Abdomen is soft. There is no mass.      Tenderness: There is no abdominal tenderness. There is no guarding.   Musculoskeletal:      Cervical back: Normal range of motion. No rigidity.      Right lower leg: No edema.      Left lower leg: No edema.   Lymphadenopathy:      Cervical: No cervical adenopathy.   Skin:     General: Skin is dry.      Findings: No rash.   Neurological:      Mental Status: He is alert and oriented to person, place, and time.   Psychiatric:         Behavior: Behavior normal.         Thought Content: Thought content normal.         Judgment: Judgment normal.            Assessment and Plan     1. Annual physical exam    2. Hypertension, essential  -     lisinopriL-hydrochlorothiazide (PRINZIDE,ZESTORETIC) 10-12.5 mg per tablet; Take 1 tablet by mouth once daily.  Dispense: 90 tablet; Refill: 3    3. Mixed hyperlipidemia  -     pravastatin (PRAVACHOL) 40 MG tablet; Take 1 tablet (40 mg total) by mouth once daily.  Dispense: 90 tablet; Refill: 3    4. Chronic kidney disease, stage 3a    5. Multinodular goiter (nontoxic)  -     TSH; Future; Expected date: 01/02/2025    6. Male hypogonadism    7. BMI 34.0-34.9,adult    8. History of gout        Labs now  Exercise regularly.  Weight loss diet reviewed.   Declines Testosterone tx and ALL vaccines.           Follow up in about 1 year (around 1/2/2026) for PE.

## 2025-04-24 ENCOUNTER — OFFICE VISIT (OUTPATIENT)
Dept: URGENT CARE | Facility: CLINIC | Age: 80
End: 2025-04-24
Payer: MEDICARE

## 2025-04-24 VITALS
WEIGHT: 224 LBS | HEART RATE: 76 BPM | DIASTOLIC BLOOD PRESSURE: 84 MMHG | HEIGHT: 68 IN | OXYGEN SATURATION: 98 % | TEMPERATURE: 98 F | BODY MASS INDEX: 33.95 KG/M2 | RESPIRATION RATE: 16 BRPM | SYSTOLIC BLOOD PRESSURE: 131 MMHG

## 2025-04-24 DIAGNOSIS — H65.93 MIDDLE EAR EFFUSION, BILATERAL: ICD-10-CM

## 2025-04-24 DIAGNOSIS — R42 DIZZINESS: ICD-10-CM

## 2025-04-24 DIAGNOSIS — H81.11 BENIGN PAROXYSMAL POSITIONAL VERTIGO OF RIGHT EAR: Primary | ICD-10-CM

## 2025-04-24 NOTE — PATIENT INSTRUCTIONS
Referral ordered for ENT.  Call [phone] to schedule appt     OTC Meclizine as needed for dizziness    Flonase for congestion    See attached info for Ear exercises      Please return here or nearest emergency room for worsening symptoms   Independent sitting, transfers, standing and ambulation with good balance using appropriate assistive device and prevent falls.

## 2025-04-24 NOTE — PROGRESS NOTES
"Subjective:      Patient ID: Geovani Evans Jr. is a 79 y.o. male.    Vitals:  height is 5' 8" (1.727 m) and weight is 101.6 kg (224 lb). His oral temperature is 97.9 °F (36.6 °C). His blood pressure is 131/84 and his pulse is 76. His respiration is 16 and oxygen saturation is 98%.     Chief Complaint: Emesis    This is a 79 y.o. male who presents today with a chief complaint of motion sickness vomiting and upset stomach that started 9 days ago. Pt only vomited 1x on last Wednesday. Pt took sea sickness pills and emetrol       Provider note begins here:   Patient is a 79-year-old male here with complaints of feeling dizzy when he walks.  He has a history of hypertension, CKD, BPH.  He vomited on the 1st day only.  When he walks he feels dizzy and nauseous.  He has been taking emetrol with mild relief.  It only occurs when he walks and is not occur when he is sitting.  Denies falling.    Emesis   This is a new problem. The current episode started 1 to 4 weeks ago. The problem occurs less than 2 times per day. The problem has been unchanged. The emesis has an appearance of bile. There has been no fever. Associated symptoms include dizziness.     HENT:  Negative for ear pain, ear discharge and congestion.    Gastrointestinal:  Positive for nausea. Negative for vomiting.   Neurological:  Positive for dizziness. Negative for passing out.   Psychiatric/Behavioral:  Negative for confusion.       Objective:     Physical Exam   Constitutional: He is oriented to person, place, and time. He appears well-developed. He is cooperative.  Non-toxic appearance. He does not appear ill. No distress.   HENT:   Head: Normocephalic and atraumatic.   Ears:   Right Ear: Hearing, external ear and ear canal normal. A middle ear effusion is present.   Left Ear: Hearing, external ear and ear canal normal. A middle ear effusion is present.   Nose: Nose normal. No mucosal edema, rhinorrhea or nasal deformity. No epistaxis. Right sinus exhibits " no maxillary sinus tenderness and no frontal sinus tenderness. Left sinus exhibits no maxillary sinus tenderness and no frontal sinus tenderness.   Mouth/Throat: Uvula is midline, oropharynx is clear and moist and mucous membranes are normal. No trismus in the jaw. Normal dentition. No uvula swelling. No oropharyngeal exudate, posterior oropharyngeal edema or posterior oropharyngeal erythema.   Positive [place]-Hallpike maneuver to the right  Negative [place]-Hallpike maneuver to the left      Comments: Positive [place]-Hallpike maneuver to the right  Negative Delia-Hallpike maneuver to the left  Eyes: Conjunctivae and lids are normal. No scleral icterus.   Neck: Trachea normal and phonation normal. Neck supple. No edema present. No erythema present. No neck rigidity present.   Cardiovascular: Normal rate, regular rhythm, normal heart sounds and normal pulses.   Pulmonary/Chest: Effort normal and breath sounds normal. No stridor. No respiratory distress. He has no decreased breath sounds. He has no wheezes. He has no rhonchi. He has no rales. He exhibits no tenderness.   Abdominal: Normal appearance.   Musculoskeletal: Normal range of motion.         General: No deformity. Normal range of motion.   Neurological: He is alert and oriented to person, place, and time. He exhibits normal muscle tone. Coordination normal.   Skin: Skin is warm, dry, intact, not diaphoretic and not pale.   Psychiatric: His speech is normal and behavior is normal. Judgment and thought content normal.   Nursing note and vitals reviewed.      Assessment:     1. Benign paroxysmal positional vertigo of right ear    2. Dizziness    3. Middle ear effusion, bilateral        Plan:       Benign paroxysmal positional vertigo of right ear  -     Ambulatory referral/consult to ENT    Dizziness  -     Orthostatic vital signs  -     EKG 12-lead; Future  -     Ambulatory referral/consult to ENT    Middle ear effusion, bilateral      Orthostatics negative  EKG similar  when compared to previous   Bilateral middle ear effusion  Positive [place]-Hallpike maneuver to the right    Advised to start using Flonase to help with ear effusions  Printed out vestibular exercises for patient to start doing to help with benign paroxysmal positional vertigo.  Placed referral for ENT in case symptoms worsen or do not improve             Patient Instructions   Referral ordered for ENT.  Call [phone] to schedule appt     OTC Meclizine as needed for dizziness    Flonase for congestion    See attached info for Ear exercises      Please return here or nearest emergency room for worsening symptoms

## 2025-05-19 ENCOUNTER — OFFICE VISIT (OUTPATIENT)
Facility: CLINIC | Age: 80
End: 2025-05-19
Payer: MEDICARE

## 2025-05-19 VITALS
HEIGHT: 68 IN | SYSTOLIC BLOOD PRESSURE: 120 MMHG | WEIGHT: 228.81 LBS | HEART RATE: 60 BPM | BODY MASS INDEX: 34.68 KG/M2 | DIASTOLIC BLOOD PRESSURE: 69 MMHG

## 2025-05-19 DIAGNOSIS — F09 COGNITIVE DYSFUNCTION: ICD-10-CM

## 2025-05-19 DIAGNOSIS — I10 HYPERTENSION, ESSENTIAL: Primary | ICD-10-CM

## 2025-05-19 DIAGNOSIS — G20.C PARKINSONISM, UNSPECIFIED PARKINSONISM TYPE: ICD-10-CM

## 2025-05-19 DIAGNOSIS — R27.0 ATAXIA, UNSPECIFIED: ICD-10-CM

## 2025-05-19 DIAGNOSIS — R27.8 OTHER LACK OF COORDINATION: ICD-10-CM

## 2025-05-19 PROCEDURE — 1160F RVW MEDS BY RX/DR IN RCRD: CPT | Mod: CPTII,HCNC,S$GLB, | Performed by: NEUROLOGICAL SURGERY

## 2025-05-19 PROCEDURE — 3078F DIAST BP <80 MM HG: CPT | Mod: CPTII,HCNC,S$GLB, | Performed by: NEUROLOGICAL SURGERY

## 2025-05-19 PROCEDURE — 1159F MED LIST DOCD IN RCRD: CPT | Mod: CPTII,HCNC,S$GLB, | Performed by: NEUROLOGICAL SURGERY

## 2025-05-19 PROCEDURE — 3074F SYST BP LT 130 MM HG: CPT | Mod: CPTII,HCNC,S$GLB, | Performed by: NEUROLOGICAL SURGERY

## 2025-05-19 PROCEDURE — 99999 PR PBB SHADOW E&M-EST. PATIENT-LVL III: CPT | Mod: PBBFAC,HCNC,, | Performed by: NEUROLOGICAL SURGERY

## 2025-05-19 PROCEDURE — 99204 OFFICE O/P NEW MOD 45 MIN: CPT | Mod: HCNC,S$GLB,, | Performed by: NEUROLOGICAL SURGERY

## 2025-05-19 PROCEDURE — 3288F FALL RISK ASSESSMENT DOCD: CPT | Mod: CPTII,HCNC,S$GLB, | Performed by: NEUROLOGICAL SURGERY

## 2025-05-19 PROCEDURE — 1101F PT FALLS ASSESS-DOCD LE1/YR: CPT | Mod: CPTII,HCNC,S$GLB, | Performed by: NEUROLOGICAL SURGERY

## 2025-05-19 NOTE — PROGRESS NOTES
Name: Geovani Evans Jr.  MRN: [id number]   [facility]: [id number]      Date: 05/19/2025      History of Present Illness    CHIEF COMPLAINT:  Mr. Evans presents today for evaluation of falls and tremors    FALLS AND BALANCE:  He experienced two falls within the past year. The first fall occurred outside a restaurant without any obvious precipitating factors. The second fall involved stumbling, possibly associated with dizziness. He denies alcohol consumption as a contributing factor. He currently has difficulty getting up from a seated position and feels weak when attempting to stand. His spouse has observed this weakness during daily activities. He is currently limping due to a foot issue.    NEUROLOGICAL SYMPTOMS:  He experiences headaches associated with sneezing that resolve after sitting briefly. Recent MRI of the head showed normal findings.    MEDICATIONS:  He takes medications for cholesterol and hypertension. He denies taking any medications for anxiety, depression, or gout.      ROS:  General: -fever, -chills, -fatigue, -weight gain, -weight loss  Eyes: -vision changes, -redness, -discharge  ENT: -ear pain, -nasal congestion, -sore throat  Cardiovascular: -chest pain, -palpitations, -lower extremity edema  Respiratory: -cough, -shortness of breath  Gastrointestinal: -abdominal pain, -nausea, -vomiting, -diarrhea, -constipation, -blood in stool  Genitourinary: -dysuria, -hematuria, -frequency  Musculoskeletal: -joint pain, -muscle pain, +muscle weakness, +difficulty standing up  Skin: -rash, -lesion  Neurological: +headache, +dizziness, -numbness, -tingling, +falling, +tremors, +involuntary movements, +frequent sneezing  Psychiatric: -anxiety, -depression, -sleep difficulty              Past Medical History: The patient  has a past medical history of Anemia, Arthritis, Cataract, Hypertension, Hypogonadism male, Obesity, and Thyroid nodule.    Social History: The patient  reports that he has never smoked. He has never  "used smokeless tobacco. He reports that he does not drink alcohol and does not use drugs.    Family History: Their family history includes Heart disease in his father; No Known Problems in his brother, daughter, daughter, daughter, and mother; Stroke in his father.    Allergies: Patient has no known allergies.     Meds: Scheduled Meds:  Continuous Infusions:  PRN Meds:.    Exam:  Physical Exam    General: No acute distress. Well-developed. Well-nourished.  Eyes: EOMI. Sclerae anicteric.  HENT: Normocephalic. Atraumatic. Nares patent. Moist oral mucosa.  Ears: Bilateral TMs clear. Bilateral EACs clear.  Cardiovascular: Regular rate. Regular rhythm. No murmurs. No rubs. No gallops. Normal S1, S2.  Respiratory: Normal respiratory effort. Clear to auscultation bilaterally. No rales. No rhonchi. No wheezing.  Abdomen: Soft. Non-tender. Non-distended. Normoactive bowel sounds.  Musculoskeletal: No  obvious deformity. Difficulty getting up without using hands.  Extremities: No lower extremity edema.  Neurological: Alert & oriented x3. No slurred speech. Normal gait. Tremors present.  Psychiatric: Normal mood. Normal affect. Good insight. Good judgment.  Skin: Warm. Dry. No rash.          /69   Pulse 60   Ht 5' 8" (1.727 m)   Wt 103.8 kg (228 lb 13.4 oz)   BMI 34.79 kg/m²     Constitutional  Well-developed, well-nourished, appears stated age   Ophthalmoscopic  No papilledema with no hemorrhages or exudates bilaterally   Cardiovascular  Radial pulses 2+ and symmetric, no LE edema bilaterally   Neurological The patient appears marginally parkinsonian, with decreased right arm swing, increased tone on the right, slightly stooped gait, and masking   * Mental status      - Orientation  Oriented to person, place, time, and situation     - Memory   Intact recent and remote     - Attention/concentration  Attentive, vigilant during exam     - Language  Naming & repetition intact, +2-step commands     - Fund of knowledge  " "Aware of current events     - Executive  Well-organized thoughts     - Other     * Cranial nerves       - CN II  PERRL, visual fields full to confrontation     - CN III, IV, VI  Extraocular movements full, normal pursuits and saccades     - CN V  Sensation V1 - V3 intact     - CN VII  Face strong and symmetric bilaterally     - CN VIII  Hearing intact bilaterally     - CN IX, X  Palate raises midline and symmetric     - CN XI  SCM and trapezius 5/5 bilaterally     - CN XII  Tongue midline   * Motor  Muscle bulk normal, strength 5/5 throughout   * Sensory   Intact to temperature and vibration throughout   * Coordination  No dysmetria with finger-to-nose or heel-to-shin   * Gait  See below.   * Deep tendon reflexes  2+ and symmetric throughout   Babinski downgoing bilaterally     Laboratory/Radiological:Reviewed  - Results:  Office Visit on 04/24/2025   Component Date Value Ref Range Status    QRS Duration 04/24/2025 114  ms Final    [facility] QTC Calculation 04/24/2025 427  ms Final       - Independent review of images:  MRI brain reviewed demonstrating only mild chronic age appropriate changes      Assessment & Plan    IMPRESSION:  - Considered history of falls and reported tremors, ordered labs and specialized brain scan to rule out neurological issues.  - Recent MRI Head was normal, ruling out many serious conditions.  - Low index of suspicion for significant neurological problems, but proceeding with further testing for thoroughness.  - Acknowledged age-related wear and tear as potential factor in symptoms.    HEADACHE:  - Discussed that headaches after sneezing are often due to increased pressure in the head (Valsalva effect) and usually not a sign of serious issues, especially with a normal MRI. Clarified that it is common to have various symptoms without finding anything "nasty" on imaging studies.    NEUROLOGICAL DISORDERS:  - Ordered specialized brain scan (likely PET or SPECT) to assess areas involved in movement " and coordination. Referred to physical therapy for balance and coordination assessment.    EXERCISE AND PHYSICAL ACTIVITY:  - Explained that exercise, particularly moving around, is highly beneficial for overall health. Mr. Evans to continue current exercise routine including treadmill, bike, and yard work.    LABS:  - Ordered basic labs.    FOLLOW-UP CARE:  - Follow up after completion of ordered tests and physical therapy evaluation.    PLAN SUMMARY:  - Order specialized brain scan (TRELL) for movement and coordination assessment  - Order basic labs  - Refer to physical therapy for balance and coordination evaluation  - Continue current exercise routine (treadmill, bike, yard work)  - Follow up after completion of ordered tests and physical therapy evaluation          Risks/benefits, potential side effects of medications, and alternative therapies discussed as appropriate.    This note was generated with the assistance of ambient listening technology. Verbal consent was obtained by the patient and accompanying visitor(s) for the recording of patient appointment to facilitate this note. I attest to having reviewed and edited the generated note for accuracy, though some syntax or spelling errors may persist. Please contact the author of this note for any clarification.       NOEMÍ Molina M.D.

## 2025-05-27 NOTE — PROGRESS NOTES
Outpatient Rehab    Physical Therapy Evaluation (only)    Patient Name: Geovani Evans Jr.  MRN: [id number]  YOB: 1945  Encounter Date: 5/28/2025    Therapy Diagnosis:   Encounter Diagnoses   Name Primary?    Hypertension, essential     Cognitive dysfunction     Parkinsonism, unspecified Parkinsonism type     Impaired functional mobility, balance, gait, and endurance Yes     Physician: Martin Molina Jr., MD    Physician Orders: Eval and Treat  Medical Diagnosis: Hypertension, essential  Cognitive dysfunction  Parkinsonism, unspecified Parkinsonism type    Visit # / Visits Authorized:  1 / 1  Insurance Authorization Period: 5/19/2025 to 5/19/2026  Date of Evaluation: 5/28/2025  Plan of Care Certification: 5/28/2025 to 7/23/2025     Time In: 0930   Time Out: 1010  Total Time (in minutes): 40   Total Billable Time (in minutes):  40     Intake Outcome Measure for FOTO Survey    Therapist reviewed FOTO scores for Geovani Evans Jr. on 5/28/2025.   FOTO report - see Media section or FOTO account episode details.     Intake Score: 77%    Precautions:     Standard       HPI from neurology on 5/19/2025:  CHIEF COMPLAINT:  Mr. Evans presents today for evaluation of falls and tremors     FALLS AND BALANCE:  He experienced two falls within the past year. The first fall occurred outside a restaurant without any obvious precipitating factors. The second fall involved stumbling, possibly associated with dizziness. He denies alcohol consumption as a contributing factor. He currently has difficulty getting up from a seated position and feels weak when attempting to stand. His spouse has observed this weakness during daily activities. He is currently limping due to a foot issue.      Subjective   History of Present Illness  Geovani is a 79 y.o. male                  History of Present Condition/Illness: The patient reports he had one fall recently (wife reports he has had two falls). He reports that his wife  scheduled the appointment with neurology and he thinks that she thinks there is more going on than there actually is. He reports he does not feel off balance or unstable. He does not report any weakness or fatigue. Does not use any AD to ambulate. Walks on treadmill at the gym 2-3 times a week. The patient's wife reports she has noticed he is more unstable when moving around than he was in the past. She also reports resting tremor occasionally, patient denies this. The patient's wife also reports he has been having more trouble standing from a chair.     Activities of Daily Living  Social history was obtained from Patient.    General Prior Level of Function Comments: indepedent with functional mobility and ADLs  General Current Level of Function Comments: indepedent with functional mobility and ADLs  Patient Responsibilities: Driving, Home management, Meal prep, Health management, Shopping, Community mobility        Pain     Patient reports a current pain level of 8/10. Pain at best is reported as 6/10. Pain at worst is reported as 10/10.   Location: left foot  Clinical Progression (since onset): Stable  Pain Qualities: Aching  Pain-Relieving Factors: Rest, Medications - over-the-counter  Pain-Aggravating Factors: Movement, Walking         Living Arrangements  Living Situation  Housing: Home independently  Living Arrangements: Spouse/significant other    Home Setup  Type of Structure: House  Number of Levels in Home: One level    Owns cane but does not use it       Employment  Patient does not report that: Does the patient's condition impact their ability to work?  Employment Status: Retired          Past Medical History/Physical Systems Review:   Geovani Evans Jr.  has a past medical history of Anemia, Arthritis, Cataract, Hypertension, Hypogonadism male, Obesity, and Thyroid nodule.    Geovani Evans Jr.  has a past surgical history that includes Fracture surgery (1970) and Eye surgery (Left).    Geovani has a  current medication list which includes the following prescription(s): diclofenac sodium, latanoprost, lisinopril-hydrochlorothiazide, and pravastatin.    Review of patient's allergies indicates:  No Known Allergies     Objective      Mental status: alert, oriented to person, place, and time  Appearance: Casually dressed  Behavior:  calm and cooperative  Attention Span and Concentration:  Normal    Dominant hand:  right     Posture Alignment in sitting/ standing:   Head: forward head   Scapulae: WNL   Trunk: WNL   Pelvis: WNL   Legs: WNL     Sensation: Light Touch: Intact, no reports of numbness or tingling          Tone: 0 - No increase in muscle tone  Limbs/muscles affected: NA    Visual/Auditory: denies changes in vision or hearing outside of age related changes       Coordination:   - LE coordination:  no significant coordination impairments     ROM:   UPPER EXTREMITY--AROM/PROM  (R) UE: WFLs  (L) UE: WFLs           RANGE OF MOTION--LOWER EXTREMITIES  (R) LE Hip: normal   Knee: normal   Ankle: normal    (L) LE: Hip: normal   Knee: normal   Ankle: normal    Strength: manual muscle test grades below       Lower Extremity Strength  Right LE  Left LE    Hip Flexion: 4/5 Hip Flexion: 4/5   Hip Extension:  4+/5 Hip Extension: 5/5   Hip Abduction: 5/5 Hip Abduction: 5/5   Hip Adduction: 5/5 Hip Adduction 5/5   Knee Extension: 5/5 Knee Extension: 5/5   Knee Flexion: 4+/5 Knee Flexion: 4+/5   Ankle Dorsiflexion: 5/5 Ankle Dorsiflexion: NT due to pain   Ankle Plantarflexion: 5/5 Ankle Plantarflexion: 5/5     Abdominal Strength: 3/5    Flexibility: WNL     Evaluation   Single Limb Stance R LE 3 seconds   (<10 sec = HIGH FALL RISK)   Single Limb Stance L LE 3 seconds   (<10 sec = HIGH FALL RISK)      Evaluation   30 second Chair Rise  (adults > 61 y/o) 12 completed with no arms   5 times sit-stand  (adults 18-63 y/o) 12 seconds  >12 sec= fall risk for general elderly  >16 sec= fall risk for Parkinson's disease  >10 sec=  "balance/vestibular dysfunction (<61 y/o)  >14.2 sec= balance/vestibular dysfunction (>61 y/o)  >12 sec= fall risk for CVA   TOMMY SENSORY TESTING:  (P= Pass, F= Fail; note any sway; hold each position for 30")  Condition 1: (firm surface/feet together/eyes open) P  Condition 2: (firm surface/feet together/eyes closed) P  Condition 3: (firm surface/feet in tandem/eyes open) P BLE leading   Condition 4: (firm surface/feet in tandem/eyes closed) P LLE leading; F RLE leading 10 seconds   Condition 5: (soft surface/feet together/eyes open) P  Condition 6: (soft surface/feet together/eyes closed) F 7 Seconds  Condition 7: (Fakuda step test), measure distance varied from center starting position NT     Gait Assessment:   - AD used: none   - Assistance: independent   - Distance: ambulatory throughout session     GAIT DEVIATIONS:  Geovani displays the following deviations with ambulation: decreased step length on LLE due to pain, decreased velocity of limb movement, decreased BLE foot clearance    Impairments contributing to deviations: impaired motor control, pain in left foot, impaired endurance     Endurance Deficit: mild       Evaluation   Self Selected Walking Speed  0.86 m/sec (6m/7s)   Fast Walking Speed 1.2 m/sec (6m/5s)       Functional Gait Assessment:   1. Gait on level surface =  2  2. Change in Gait Speed = 3  3. Gait with horizontal head turns  = 3  4. Gait with vertical head turns = 3  5. Gait with pivot turns = 2  6. Step over obstacle = 3  7. Gait with Narrow JONATHAN = 1  8. Gait with eyes closed = 3  9. Ambulating Backwards = 2  10. Steps = 3     Score 25/30   Score:   <22/30 fall risk   <20/30 fall risk in older adults   <18/30 fall risk in Parkinsons          Time Entry(in minutes):  PT Evaluation (Low) Time Entry: 40    Assessment & Plan   Assessment  Geovani presents with a condition of Low complexity.   Presentation of Symptoms: Stable, Uncomplicated  Will Comorbidities Impact Care: No       Functional " Limitations: Activity tolerance, Ambulating on uneven surfaces, Decreased ambulation distance/endurance, Maintaining balance, Participating in sports, Painful locomotion/ambulation, Gait limitations, Getting off the floor, Increased risk of fall  Impairments: Abnormal gait, Impaired balance, Activity intolerance, Pain with functional activity, Lack of appropriate home exercise program, Impaired physical strength  Personal Factors Affecting Prognosis: Pain, Physical limitations    Prognosis: Fair  Prognosis Details: Due to significant left foot pain of unknown origin, patient has appointment with podiatry   Assessment Details: Mr. Olivo tolerated today's initial evaluation very well Mr. Olivo reports no functional deficits but his wife reports she has noticed a progressive decline in his overall mobility. He does report recent onset significant left foot pain that has remained stable for the past three weeks. He has appointment with podiatry next week to assess the feet. Objective scores may be impacted by left foot pain, and the patient/ patient's wife were instructed to wait to schedule follow up neuro PT appointments after seeing podiatry to rule out various concerning medical conditions. They verbalize understanding. Mr. Olivo presents with good BLE strength based on the MMT scores. Slight strength deficits noted with BLE hip flexion/ extension and bilateral knee flexion. The patient's SSWS placed him in the community ambulator with increased time needs category. The patient's FGA score places him outside of the elevated falls risk category but could likely improve with dynamic balance training. The patient's BLE single limb stance time places him inside the elevated falls risk category. The patient performed well on the 30 second chair rise test but would likely show improvement with this as well with therapy intervention. The patient would benefit from brief physical therapy POC to address deficits list  above and provide home exercise program/ education on exercises the patient can perform following discharge.    Plan  From a physical therapy perspective, the patient would benefit from: Skilled Rehab Services    Planned therapy interventions include: Therapeutic exercise, Therapeutic activities, Manual therapy, Neuromuscular re-education, ADLs/IADLs, Cognitive functional training, Community/work reintegration, Gait training, Lymphatic compression wrapping, Orthotic management and training, Prosthetic management and training, and Wheelchair management.            Visit Frequency: 1 times Per Week for 8 Weeks.       This plan was discussed with Patient.   Discussion participants: Agreed Upon Plan of Care  Plan details: Emphasis on home exercise program for balance and high level strength training           The patient's spiritual, cultural, and educational needs were considered, and the patient is agreeable to the plan of care and goals.           Goals:   Active       Short term goals= long term goals        Improve hip flexion by 1/3 MMT        Start:  05/28/25    Expected End:  07/23/25            Improve FGA score to 27/30       Start:  05/28/25    Expected End:  07/23/25            improve SSWS to 1.0 m/sec       Start:  05/28/25    Expected End:  07/23/25            Improve 30 second chair rise score to 14 stands       Start:  05/28/25    Expected End:  07/23/25                Tana Hopper, PT

## 2025-05-28 ENCOUNTER — CLINICAL SUPPORT (OUTPATIENT)
Dept: REHABILITATION | Facility: HOSPITAL | Age: 80
End: 2025-05-28
Attending: NEUROLOGICAL SURGERY
Payer: MEDICARE

## 2025-05-28 ENCOUNTER — TELEPHONE (OUTPATIENT)
Dept: PODIATRY | Facility: CLINIC | Age: 80
End: 2025-05-28
Payer: MEDICARE

## 2025-05-28 DIAGNOSIS — Z74.09 IMPAIRED FUNCTIONAL MOBILITY, BALANCE, GAIT, AND ENDURANCE: Primary | ICD-10-CM

## 2025-05-28 DIAGNOSIS — I10 HYPERTENSION, ESSENTIAL: ICD-10-CM

## 2025-05-28 DIAGNOSIS — F09 COGNITIVE DYSFUNCTION: ICD-10-CM

## 2025-05-28 DIAGNOSIS — G20.C PARKINSONISM, UNSPECIFIED PARKINSONISM TYPE: ICD-10-CM

## 2025-05-28 PROCEDURE — 97161 PT EVAL LOW COMPLEX 20 MIN: CPT | Mod: HCNC,PO

## 2025-05-28 NOTE — TELEPHONE ENCOUNTER
Left message on voicemail  confirmation of your upcoming appointment with our podiatry department. We look forward to seeing you on 6/3  8:15 am  appointment  with Dr. Khan [address] [place] [address] [address] . Please park behind the second grey building.      If you have any questions or need further assistance, please do not hesitate to contact our office at [phone]

## 2025-06-03 ENCOUNTER — TELEPHONE (OUTPATIENT)
Dept: INTERNAL MEDICINE | Facility: CLINIC | Age: 80
End: 2025-06-03
Payer: MEDICARE

## 2025-06-03 ENCOUNTER — OFFICE VISIT (OUTPATIENT)
Dept: PODIATRY | Facility: CLINIC | Age: 80
End: 2025-06-03
Payer: MEDICARE

## 2025-06-03 ENCOUNTER — HOSPITAL ENCOUNTER (OUTPATIENT)
Dept: RADIOLOGY | Facility: HOSPITAL | Age: 80
Discharge: HOME | End: 2025-06-03
Attending: PODIATRIST
Payer: MEDICARE

## 2025-06-03 VITALS
BODY MASS INDEX: 34.78 KG/M2 | WEIGHT: 229.5 LBS | DIASTOLIC BLOOD PRESSURE: 84 MMHG | HEIGHT: 68 IN | SYSTOLIC BLOOD PRESSURE: 133 MMHG | HEART RATE: 76 BPM

## 2025-06-03 DIAGNOSIS — M79.671 ACUTE FOOT PAIN, RIGHT: ICD-10-CM

## 2025-06-03 DIAGNOSIS — Z87.39 H/O: GOUT: ICD-10-CM

## 2025-06-03 DIAGNOSIS — R60.0 EDEMA OF LEFT FOOT: ICD-10-CM

## 2025-06-03 DIAGNOSIS — B35.1 ONYCHOMYCOSIS DUE TO DERMATOPHYTE: ICD-10-CM

## 2025-06-03 DIAGNOSIS — M79.671 ACUTE FOOT PAIN, RIGHT: Primary | ICD-10-CM

## 2025-06-03 DIAGNOSIS — N18.31 CHRONIC KIDNEY DISEASE, STAGE 3A: ICD-10-CM

## 2025-06-03 PROCEDURE — 3075F SYST BP GE 130 - 139MM HG: CPT | Mod: CPTII,HCNC,S$GLB, | Performed by: PODIATRIST

## 2025-06-03 PROCEDURE — 3079F DIAST BP 80-89 MM HG: CPT | Mod: CPTII,HCNC,S$GLB, | Performed by: PODIATRIST

## 2025-06-03 PROCEDURE — 3288F FALL RISK ASSESSMENT DOCD: CPT | Mod: CPTII,HCNC,S$GLB, | Performed by: PODIATRIST

## 2025-06-03 PROCEDURE — 73630 X-RAY EXAM OF FOOT: CPT | Mod: 26,HCNC,LT, | Performed by: RADIOLOGY

## 2025-06-03 PROCEDURE — 99214 OFFICE O/P EST MOD 30 MIN: CPT | Mod: HCNC,S$GLB,, | Performed by: PODIATRIST

## 2025-06-03 PROCEDURE — 1159F MED LIST DOCD IN RCRD: CPT | Mod: CPTII,HCNC,S$GLB, | Performed by: PODIATRIST

## 2025-06-03 PROCEDURE — 1101F PT FALLS ASSESS-DOCD LE1/YR: CPT | Mod: CPTII,HCNC,S$GLB, | Performed by: PODIATRIST

## 2025-06-03 PROCEDURE — 73630 X-RAY EXAM OF FOOT: CPT | Mod: TC,HCNC,PN,LT

## 2025-06-03 PROCEDURE — 1125F AMNT PAIN NOTED PAIN PRSNT: CPT | Mod: CPTII,HCNC,S$GLB, | Performed by: PODIATRIST

## 2025-06-03 PROCEDURE — 99999 PR PBB SHADOW E&M-EST. PATIENT-LVL III: CPT | Mod: PBBFAC,HCNC,, | Performed by: PODIATRIST

## 2025-06-03 RX ORDER — METHYLPREDNISOLONE 4 MG/1
TABLET ORAL
Qty: 1 EACH | Refills: 0 | Status: SHIPPED | OUTPATIENT
Start: 2025-06-03

## 2025-06-03 RX ORDER — CICLOPIROX 80 MG/ML
SOLUTION TOPICAL NIGHTLY
Qty: 6.6 ML | Refills: 1 | Status: SHIPPED | OUTPATIENT
Start: 2025-06-03

## 2025-06-04 ENCOUNTER — RESULTS FOLLOW-UP (OUTPATIENT)
Dept: PODIATRY | Facility: CLINIC | Age: 80
End: 2025-06-04
Payer: MEDICARE

## 2025-06-04 ENCOUNTER — TELEPHONE (OUTPATIENT)
Dept: PODIATRY | Facility: CLINIC | Age: 80
End: 2025-06-04
Payer: MEDICARE

## 2025-06-12 ENCOUNTER — OFFICE VISIT (OUTPATIENT)
Dept: INTERNAL MEDICINE | Facility: CLINIC | Age: 80
End: 2025-06-12
Payer: MEDICARE

## 2025-06-12 ENCOUNTER — TELEPHONE (OUTPATIENT)
Dept: PODIATRY | Facility: CLINIC | Age: 80
End: 2025-06-12
Payer: MEDICARE

## 2025-06-12 VITALS
DIASTOLIC BLOOD PRESSURE: 68 MMHG | OXYGEN SATURATION: 98 % | WEIGHT: 226.63 LBS | BODY MASS INDEX: 34.35 KG/M2 | SYSTOLIC BLOOD PRESSURE: 110 MMHG | HEIGHT: 68 IN | HEART RATE: 70 BPM

## 2025-06-12 DIAGNOSIS — Z87.39 HISTORY OF GOUT: ICD-10-CM

## 2025-06-12 DIAGNOSIS — I10 HYPERTENSION, UNSPECIFIED TYPE: ICD-10-CM

## 2025-06-12 DIAGNOSIS — M19.071 PRIMARY OSTEOARTHRITIS OF RIGHT FOOT: Primary | ICD-10-CM

## 2025-06-12 PROCEDURE — 1101F PT FALLS ASSESS-DOCD LE1/YR: CPT | Mod: CPTII,HCNC,S$GLB, | Performed by: INTERNAL MEDICINE

## 2025-06-12 PROCEDURE — 3078F DIAST BP <80 MM HG: CPT | Mod: CPTII,HCNC,S$GLB, | Performed by: INTERNAL MEDICINE

## 2025-06-12 PROCEDURE — 3074F SYST BP LT 130 MM HG: CPT | Mod: CPTII,HCNC,S$GLB, | Performed by: INTERNAL MEDICINE

## 2025-06-12 PROCEDURE — 1160F RVW MEDS BY RX/DR IN RCRD: CPT | Mod: CPTII,HCNC,S$GLB, | Performed by: INTERNAL MEDICINE

## 2025-06-12 PROCEDURE — 99214 OFFICE O/P EST MOD 30 MIN: CPT | Mod: HCNC,S$GLB,, | Performed by: INTERNAL MEDICINE

## 2025-06-12 PROCEDURE — 1159F MED LIST DOCD IN RCRD: CPT | Mod: CPTII,HCNC,S$GLB, | Performed by: INTERNAL MEDICINE

## 2025-06-12 PROCEDURE — 99999 PR PBB SHADOW E&M-EST. PATIENT-LVL III: CPT | Mod: PBBFAC,HCNC,, | Performed by: INTERNAL MEDICINE

## 2025-06-12 PROCEDURE — 1125F AMNT PAIN NOTED PAIN PRSNT: CPT | Mod: CPTII,HCNC,S$GLB, | Performed by: INTERNAL MEDICINE

## 2025-06-12 PROCEDURE — 3288F FALL RISK ASSESSMENT DOCD: CPT | Mod: CPTII,HCNC,S$GLB, | Performed by: INTERNAL MEDICINE

## 2025-06-12 PROCEDURE — G2211 COMPLEX E/M VISIT ADD ON: HCPCS | Mod: HCNC,S$GLB,, | Performed by: INTERNAL MEDICINE

## 2025-06-12 RX ORDER — MELOXICAM 15 MG/1
15 TABLET ORAL DAILY
Qty: 30 TABLET | Refills: 1 | Status: SHIPPED | OUTPATIENT
Start: 2025-06-12

## 2025-06-12 NOTE — PROGRESS NOTES
Subjective     Patient ID: Geovani Evans Jr. is a 79 y.o. male.    Chief Complaint: Gout    HPI  C/o persistent pain of l foot     Saw Dr Shetty 6/3 after 3 weeks of pain.      CXR:  There is erosive osteoarthritis of the 1st MTP joint with a hallux valgus deformity.  No radiopaque foreign body seen.  There is soft tissue swelling around the 1st MTP joint.  No acute fracture, dislocation, or bone destruction seen.  This demonstrates more swelling compared to 03/14/2022.          He has h/o recurrent gout L foot.  This episode is worse than previous episodes.  Duration longer than usual.         Sheet hurts the foot initially.  Constant pain.  Worse with walking.  May awaken him from sleep.  No trouble wearing shoe.         Using cane last few weeks.        Ibuprofen, ice   not effective.      Boot not helpful.        Htn well controlled with lisinopril/hctz.   Review of Systems   Constitutional:  Negative for activity change and unexpected weight change.   Respiratory:  Negative for chest tightness and shortness of breath.    Cardiovascular:  Negative for chest pain and leg swelling.   Gastrointestinal:  Negative for abdominal pain.   Musculoskeletal:  Positive for arthralgias.   Neurological:  Negative for headaches.   Psychiatric/Behavioral:  Negative for dysphoric mood.           Objective     Physical Exam  Constitutional:       Appearance: Normal appearance.   Skin:     Findings: No erythema.   Neurological:      Mental Status: He is alert.   Psychiatric:         Mood and Affect: Mood normal.         Behavior: Behavior normal.         Thought Content: Thought content normal.          1 st mtp minimally tender.  No erythema.     Mild edema top of foot.  Pain when rt toe is flexed  Assessment and Plan     1. Primary osteoarthritis of right foot    2. History of gout    3. Hypertension, unspecified type    Other orders  -     meloxicam (MOBIC) 15 MG tablet; Take 1 tablet (15 mg total) by mouth once daily. For  foot pain  Dispense: 30 tablet; Refill: 1          Cc Dr Shetty - She will re-evaluate     Once pain resolves, or is under better control,      Start allopurinol  and colchicine once I have confirmation this is not gout.               No follow-ups on file.

## 2025-06-12 NOTE — TELEPHONE ENCOUNTER
Call patient in regards to message per Dr. Khan to get patient scheduled sooner for appointment with her or with another provider for second opinion. Patient did not answer so I left voicemail for patient to reach out to us at [phone] to help with getting scheduled sooner.

## 2025-06-17 ENCOUNTER — OFFICE VISIT (OUTPATIENT)
Dept: PODIATRY | Facility: CLINIC | Age: 80
End: 2025-06-17
Payer: MEDICARE

## 2025-06-17 VITALS
BODY MASS INDEX: 34.5 KG/M2 | WEIGHT: 227.63 LBS | DIASTOLIC BLOOD PRESSURE: 63 MMHG | HEIGHT: 68 IN | HEART RATE: 57 BPM | SYSTOLIC BLOOD PRESSURE: 110 MMHG

## 2025-06-17 DIAGNOSIS — M20.22 HALLUX RIGIDUS OF LEFT FOOT: Primary | ICD-10-CM

## 2025-06-17 DIAGNOSIS — M79.675 GREAT TOE PAIN, LEFT: ICD-10-CM

## 2025-06-17 DIAGNOSIS — Z87.39 H/O: GOUT: ICD-10-CM

## 2025-06-17 DIAGNOSIS — B35.1 ONYCHOMYCOSIS DUE TO DERMATOPHYTE: ICD-10-CM

## 2025-06-17 PROCEDURE — 99999 PR PBB SHADOW E&M-EST. PATIENT-LVL III: CPT | Mod: PBBFAC,HCNC,, | Performed by: PODIATRIST

## 2025-06-17 NOTE — PROGRESS NOTES
Subjective:      Patient ID: Geovani Evans Jr. is a 79 y.o. male.    Chief Complaint:   Foot Problem (Left foot/PCP- 06/12/2025/Julia Fonseca MD)    Geovani is a 79 y.o. male who returns to the podiatry clinic f/u foot pain  Overall better. No pain today unless toe moved a certain way.. relates he was able to cut his grass yesterday without pain as well  Pt relates the oral medication ( pt likely referring to mobic) was helpful    Pt presents with wife.   Also pt using the penlac nail paint     Xray revealed + djd    Saw pcp:  Feels this is more osteoarthtitis and not gout attack related  Rx mobic.       Last visit:  - likely gout  - caution with CKD/ cholcrys   - Medrol dose ramses prescribed, advised patient to take meds as directed. Patient should complete medication. If problems occur notify the clinic   - needs pcp to manage gout  Per chart review allopurinol d/c. 2022  Reached out to pcp to obtain f/u visit  Pt and wife  not aware of previous gout, ckd, etc.. some education piece  -  rx xray         Lab Results   Component Value Date    URICACID 8.4 (H) 03/14/2024          Past Medical History:   Diagnosis Date    Anemia     Arthritis     Cataract     Hypertension     Hypogonadism male     Obesity     Thyroid nodule     neg bx 2/2011     Past Surgical History:   Procedure Laterality Date    EYE SURGERY Left     cataract     FRACTURE SURGERY  1970    left great toe. bunion repair/shaved bone     Current Outpatient Medications on File Prior to Visit   Medication Sig Dispense Refill    ciclopirox (PENLAC) 8 % Soln Apply topically nightly. Remove with nail polish remover once a week 6.6 mL 1    diclofenac sodium (VOLTAREN) 1 % Gel Apply 2 g topically once daily. 20 g 3    lisinopriL-hydrochlorothiazide (PRINZIDE,ZESTORETIC) 10-12.5 mg per tablet Take 1 tablet by mouth once daily. 90 tablet 3    meloxicam (MOBIC) 15 MG tablet Take 1 tablet (15 mg total) by mouth once daily. For foot pain 30 tablet 1     "methylPREDNISolone (MEDROL DOSEPACK) 4 mg tablet use as directed 1 each 0    pravastatin (PRAVACHOL) 40 MG tablet Take 1 tablet (40 mg total) by mouth once daily. 90 tablet 3    latanoprost 0.005 % ophthalmic solution Place 1 drop into the left eye every evening. 2.5 mL 11     No current facility-administered medications on file prior to visit.     Review of patient's allergies indicates:  No Known Allergies    Review of Systems   Constitutional: Negative for chills, decreased appetite, fever, malaise/fatigue, night sweats, weight gain and weight loss.   Cardiovascular:  Negative for chest pain, claudication, dyspnea on exertion, leg swelling, palpitations and syncope.   Respiratory:  Negative for cough and shortness of breath.    Endocrine: Negative for cold intolerance and heat intolerance.   Hematologic/Lymphatic: Negative for bleeding problem. Does not bruise/bleed easily.   Skin:  Positive for nail changes. Negative for color change, dry skin, flushing, itching, poor wound healing, rash, skin cancer, suspicious lesions and unusual hair distribution.   Musculoskeletal:  Positive for arthritis and joint swelling. Negative for back pain, falls, gout, joint pain, muscle cramps, muscle weakness, myalgias, neck pain and stiffness.   Gastrointestinal:  Negative for diarrhea, nausea and vomiting.   Neurological:  Positive for numbness and paresthesias. Negative for dizziness, focal weakness, light-headedness, tremors, vertigo and weakness.   Psychiatric/Behavioral:  Negative for altered mental status and depression. The patient does not have insomnia.    Allergic/Immunologic: Negative.            Objective:       Vitals:    06/17/25 0937   BP: 110/63   Pulse: (!) 57   Weight: 103.3 kg (227 lb 10 oz)   Height: 5' 8" (1.727 m)   PainSc:   3   PainLoc: Foot   103.3 kg (227 lb 10 oz)     Physical Exam  Vitals reviewed.   Constitutional:       General: He is not in acute distress.     Appearance: He is well-developed. He is " not ill-appearing, toxic-appearing or diaphoretic.      Comments: Proper supportive shoegear   Tennis shoes    Cane    Cardiovascular:      Pulses:           Dorsalis pedis pulses are 2+ on the right side and 2+ on the left side.        Posterior tibial pulses are 1+ on the right side and 1+ on the left side.   Musculoskeletal:         General: No swelling.      Right lower le+ Edema present.      Left lower le+ Edema present.      Right ankle: Swelling present. No tenderness. No lateral malleolus, base of 5th metatarsal or proximal fibula tenderness. Normal range of motion. Anterior drawer test negative.      Right Achilles Tendon: Normal. No tenderness.      Left ankle: Normal. No tenderness. No base of 5th metatarsal or proximal fibula tenderness. Anterior drawer test negative.      Left Achilles Tendon: Normal. No tenderness.      Right foot: Decreased range of motion. Deformity, bunion and prominent metatarsal heads present. No tenderness, bony tenderness or crepitus.      Left foot: Decreased range of motion. Deformity, bunion, prominent metatarsal heads, tenderness and bony tenderness present. No crepitus.      Comments: - pop to 1st mTPJ/medial eminence.          No pain to other areas foot/ankle   Feet:      Right foot:      Protective Sensation: 5 sites tested.  5 sites sensed.      Skin integrity: Dry skin present. No ulcer, blister, skin breakdown, erythema, warmth or callus.      Toenail Condition: Right toenails are abnormally thick. Fungal disease present.     Left foot:      Protective Sensation: 5 sites tested.  5 sites sensed.      Skin integrity: Dry skin present. No ulcer, blister, skin breakdown, erythema, warmth or callus.      Toenail Condition: Left toenails are abnormally thick.      Comments: No erythema   Skin:     General: Skin is warm.      Capillary Refill: Capillary refill takes 2 to 3 seconds.      Coloration: Skin is not pale.      Findings: No erythema or rash.      Nails:  There is no clubbing.   Neurological:      Mental Status: He is alert and oriented to person, place, and time.      Sensory: No sensory deficit.   Psychiatric:         Attention and Perception: Attention normal.         Mood and Affect: Mood normal.         Speech: Speech normal.         Behavior: Behavior normal.         Thought Content: Thought content normal.         Cognition and Memory: Memory is impaired.               Assessment:       Encounter Diagnoses   Name Primary?    Hallux rigidus of left foot Yes    Great toe pain, left     H/O: gout     Onychomycosis due to dermatophyte                Plan:       Geovani was seen today for foot problem.    Diagnoses and all orders for this visit:    Hallux rigidus of left foot  -     Ambulatory Referral/Consult to Physical Therapy; Future    Great toe pain, left  -     Ambulatory Referral/Consult to Physical Therapy; Future    H/O: gout    Onychomycosis due to dermatophyte        continue  Penlac.      Pt relates overall better due to mobic from pcp. Pain free today     Dispensed mt offloading pad sleeve to try.     Recommend p.therapy. pt in for balance    D/w pt xray.  Likely injection will not be beneficial.    Can consider surgical consult ( pt had surgery in the 80's)      F/u 3-4 months for nail eval

## 2025-07-14 ENCOUNTER — DOCUMENTATION ONLY (OUTPATIENT)
Dept: REHABILITATION | Facility: HOSPITAL | Age: 80
End: 2025-07-14
Payer: MEDICARE

## 2025-07-14 NOTE — PROGRESS NOTES
OUTPATIENT PHYSICAL THERAPY DISCHARGE SUMMARY     Name: Geovani Evans Jr.  Clinic Number: [id number]    Diagnosis: No diagnosis found.  Physician: No ref. provider found  Treatment Orders: PT Eval and Treat  Past Medical History:   Diagnosis Date    Anemia     Arthritis     Cataract     Hypertension     Hypogonadism male     Obesity     Thyroid nodule     neg bx 2/2011       Initial visit: 5/28/2025  Date of Last visit: 5/28/2025  Date of Discharge Note:  7/14/2025  Total Visits Received: 0  Missed Visits: 5  ASSESSMENT   Status Towards Goals Met:   Patient did not meet any goals     Goals Not achieved and why: Patient did not attend any follow up sessions and canceled 5 appointments since evaluation. The patient and alternate contact number were contacted without answer. Voicemail was left informing the patient that he is being removed from the schedule at this time due to noncompliance with attendance.         Discharge reason : Non-Compliance with attendance    PLAN   This patient is discharged from Outpatient Physical Therapy Services.     Tana Hopper, PT  07/14/2025

## 2025-07-28 RX ORDER — CICLOPIROX 80 MG/ML
SOLUTION TOPICAL
Qty: 7 ML | Refills: 0 | Status: SHIPPED | OUTPATIENT
Start: 2025-07-28

## 2025-08-05 RX ORDER — MELOXICAM 15 MG/1
TABLET ORAL
Qty: 30 TABLET | Refills: 2 | Status: SHIPPED | OUTPATIENT
Start: 2025-08-05

## 2025-08-05 NOTE — TELEPHONE ENCOUNTER
No care due was identified.  [facility] Embedded Care Due Messages. Reference number: [id number].   8/05/2025 7:04:30 AM CDT

## 2025-08-05 NOTE — TELEPHONE ENCOUNTER
Refill Routing Note   Medication(s) are not appropriate for processing by [facility] for the following reason(s):        Outside of protocol    [facility] action(s):  Route             Appointments  past 12m or future 3m with PCP    Date Provider   Last Visit   6/12/2025 Julia Fonseca MD   Next Visit   1/6/2026 Julia Fonseca MD   ED visits in past 90 days: 0        Note composed:8:18 AM 08/05/2025

## 2025-08-27 ENCOUNTER — PATIENT MESSAGE (OUTPATIENT)
Dept: PODIATRY | Facility: CLINIC | Age: 80
End: 2025-08-27
Payer: MEDICARE

## (undated) DEVICE — CARTRIDGE LENS D

## (undated) DEVICE — KIT GREY EYE

## (undated) DEVICE — DRAPE STERI 32 X 50

## (undated) DEVICE — HYDRODISSECTOR NUCLEUS 27GX7/8

## (undated) DEVICE — SLEEVE ULTRA INFUSION

## (undated) DEVICE — GOWN SURGICAL X-LARGE

## (undated) DEVICE — KNIFE ANGLE 1MM

## (undated) DEVICE — SOL WATER STRL IRR 1000ML

## (undated) DEVICE — BLADE SURG BVL ANG COAX 2.4MM

## (undated) DEVICE — TIP PHACO 45 DEGREE KELMAN

## (undated) DEVICE — SOL BETADINE 5%

## (undated) DEVICE — Device

## (undated) DEVICE — SOLUTION BSS PLUS

## (undated) DEVICE — STRIP MG FML-GLO .06 - ORDER F